# Patient Record
Sex: FEMALE | Race: WHITE | NOT HISPANIC OR LATINO | Employment: FULL TIME | URBAN - METROPOLITAN AREA
[De-identification: names, ages, dates, MRNs, and addresses within clinical notes are randomized per-mention and may not be internally consistent; named-entity substitution may affect disease eponyms.]

---

## 2017-02-27 DIAGNOSIS — K21.9 GASTROESOPHAGEAL REFLUX DISEASE WITHOUT ESOPHAGITIS: ICD-10-CM

## 2017-02-27 DIAGNOSIS — J31.0 CHRONIC RHINITIS: ICD-10-CM

## 2017-02-27 DIAGNOSIS — E78.5 HYPERLIPIDEMIA LDL GOAL <130: ICD-10-CM

## 2017-02-27 NOTE — TELEPHONE ENCOUNTER
Omeprazole      Last Written Prescription Date: 11/29/16  Last Fill Quantity: 90,  # refills: 0   Last Office Visit with [facility], [facility] or [facility] prescribing provider: 02/15/16                                         Next 5 appointments (look out 90 days)     Apr 17, 2017  3:00 PM CDT   Return Visit with Sunita Baird MD   [facility] ([facility])    [facility]  [address] [place], [place] [address]  [facility] [address]   [phone]              Simvastatin    Last Written Prescription Date: 11/29/16  Last Fill Quantity: 90, # refills: 0  Last Office Visit with [facility], [facility] or [facility] prescribing provider: 02/15/16  Next 5 appointments (look out 90 days)     Apr 17, 2017  3:00 PM CDT   Return Visit with Sunita Baird MD   [facility] ([facility])    [facility] [address]  [facility] [address]   [phone]                   Lab Results   Component Value Date    CHOL 269 12/17/2015     Lab Results   Component Value Date    HDL 60 12/17/2015     Lab Results   Component Value Date     12/17/2015     Lab Results   Component Value Date    TRIG 287 12/17/2015     Lab Results   Component Value Date    CHOLHDLRATIO 3.0 11/21/2013     Monteluskast      Last Written Prescription Date: 11/29/16  Last Fill Quantity: 90, # refills: 0    Last Office Visit with [facility], [facility] or [facility] prescribing provider:  02/15/16   Future Office Visit:    Next 5 appointments (look out 90 days)     Apr 17, 2017  3:00 PM CDT   Return Visit with Sunita Baird MD   [facility] ([facility])    [facility] [address]  [facility] [address]   [phone]                   Date of Last Asthma Action Plan Letter:   There are no preventive care reminders to display for this patient.   Asthma Control Test: No flowsheet data found.    Date of Last Spirometry Test:   No results found for this or any previous  visit.

## 2017-03-02 RX ORDER — SIMVASTATIN 40 MG
40 TABLET ORAL AT BEDTIME
Qty: 30 TABLET | Refills: 0 | Status: SHIPPED | OUTPATIENT
Start: 2017-03-02 | End: 2017-03-29

## 2017-03-02 RX ORDER — OMEPRAZOLE 40 MG/1
40 CAPSULE, DELAYED RELEASE ORAL DAILY
Qty: 30 CAPSULE | Refills: 0 | Status: SHIPPED | OUTPATIENT
Start: 2017-03-02 | End: 2017-03-29

## 2017-03-02 RX ORDER — MONTELUKAST SODIUM 10 MG/1
10 TABLET ORAL AT BEDTIME
Qty: 30 TABLET | Refills: 0 | Status: SHIPPED | OUTPATIENT
Start: 2017-03-02 | End: 2017-03-29

## 2017-03-29 ENCOUNTER — OFFICE VISIT (OUTPATIENT)
Dept: FAMILY MEDICINE | Facility: CLINIC | Age: 52
End: 2017-03-29
Payer: COMMERCIAL

## 2017-03-29 VITALS
RESPIRATION RATE: 18 BRPM | BODY MASS INDEX: 39.49 KG/M2 | SYSTOLIC BLOOD PRESSURE: 134 MMHG | HEIGHT: 65 IN | WEIGHT: 237 LBS | HEART RATE: 91 BPM | DIASTOLIC BLOOD PRESSURE: 89 MMHG

## 2017-03-29 DIAGNOSIS — E78.5 HYPERLIPIDEMIA LDL GOAL <130: ICD-10-CM

## 2017-03-29 DIAGNOSIS — J31.0 CHRONIC RHINITIS: ICD-10-CM

## 2017-03-29 DIAGNOSIS — Z11.59 NEED FOR HEPATITIS C SCREENING TEST: ICD-10-CM

## 2017-03-29 DIAGNOSIS — Z12.11 SPECIAL SCREENING FOR MALIGNANT NEOPLASMS, COLON: ICD-10-CM

## 2017-03-29 DIAGNOSIS — C50.412 MALIGNANT NEOPLASM OF UPPER-OUTER QUADRANT OF LEFT FEMALE BREAST (H): ICD-10-CM

## 2017-03-29 DIAGNOSIS — Z00.00 ROUTINE GENERAL MEDICAL EXAMINATION AT A HEALTH CARE FACILITY: Primary | ICD-10-CM

## 2017-03-29 DIAGNOSIS — R06.83 SNORING: ICD-10-CM

## 2017-03-29 DIAGNOSIS — E66.01 MORBID OBESITY, UNSPECIFIED OBESITY TYPE (H): ICD-10-CM

## 2017-03-29 DIAGNOSIS — S89.92XA LEFT KNEE INJURY, INITIAL ENCOUNTER: ICD-10-CM

## 2017-03-29 DIAGNOSIS — K21.9 GASTROESOPHAGEAL REFLUX DISEASE WITHOUT ESOPHAGITIS: ICD-10-CM

## 2017-03-29 LAB
ANION GAP SERPL CALCULATED.3IONS-SCNC: 8 MMOL/L (ref 3–14)
BUN SERPL-MCNC: 12 MG/DL (ref 7–30)
CALCIUM SERPL-MCNC: 9.8 MG/DL (ref 8.5–10.1)
CHLORIDE SERPL-SCNC: 104 MMOL/L (ref 94–109)
CHOLEST SERPL-MCNC: 194 MG/DL
CO2 SERPL-SCNC: 26 MMOL/L (ref 20–32)
CREAT SERPL-MCNC: 0.62 MG/DL (ref 0.52–1.04)
GFR SERPL CREATININE-BSD FRML MDRD: NORMAL ML/MIN/1.7M2
GLUCOSE SERPL-MCNC: 86 MG/DL (ref 70–99)
HCV AB SERPL QL IA: NORMAL
HDLC SERPL-MCNC: 82 MG/DL
LDLC SERPL CALC-MCNC: 83 MG/DL
NONHDLC SERPL-MCNC: 112 MG/DL
POTASSIUM SERPL-SCNC: 3.9 MMOL/L (ref 3.4–5.3)
SODIUM SERPL-SCNC: 138 MMOL/L (ref 133–144)
TRIGL SERPL-MCNC: 145 MG/DL

## 2017-03-29 PROCEDURE — 80048 BASIC METABOLIC PNL TOTAL CA: CPT | Performed by: FAMILY MEDICINE

## 2017-03-29 PROCEDURE — 99396 PREV VISIT EST AGE 40-64: CPT | Performed by: FAMILY MEDICINE

## 2017-03-29 PROCEDURE — 36415 COLL VENOUS BLD VENIPUNCTURE: CPT | Performed by: FAMILY MEDICINE

## 2017-03-29 PROCEDURE — 86803 HEPATITIS C AB TEST: CPT | Performed by: FAMILY MEDICINE

## 2017-03-29 PROCEDURE — 80061 LIPID PANEL: CPT | Performed by: FAMILY MEDICINE

## 2017-03-29 RX ORDER — OMEPRAZOLE 40 MG/1
40 CAPSULE, DELAYED RELEASE ORAL DAILY
Qty: 90 CAPSULE | Refills: 3 | Status: SHIPPED | OUTPATIENT
Start: 2017-03-29 | End: 2017-10-16

## 2017-03-29 RX ORDER — SIMVASTATIN 40 MG
40 TABLET ORAL AT BEDTIME
Qty: 90 TABLET | Refills: 3 | Status: SHIPPED | OUTPATIENT
Start: 2017-03-29 | End: 2018-03-21

## 2017-03-29 RX ORDER — MONTELUKAST SODIUM 10 MG/1
10 TABLET ORAL AT BEDTIME
Qty: 90 TABLET | Refills: 3 | Status: SHIPPED | OUTPATIENT
Start: 2017-03-29 | End: 2018-03-21

## 2017-03-29 RX ORDER — FLUTICASONE PROPIONATE 50 MCG
1-2 SPRAY, SUSPENSION (ML) NASAL DAILY
Qty: 3 BOTTLE | Refills: 11 | Status: SHIPPED | OUTPATIENT
Start: 2017-03-29 | End: 2018-04-16

## 2017-03-29 NOTE — MR AVS SNAPSHOT
After Visit Summary   3/29/2017    Ann Vargas    MRN: [id number]           Patient Information     Date Of Birth          1965        Visit Information        Provider Department      3/29/2017 8:40 AM Cydney Garcia MD [facility]        Today's Diagnoses     Routine general medical examination at a health care facility    -  1    Hyperlipidemia LDL goal <130        Gastroesophageal reflux disease without esophagitis        Chronic rhinitis        Special screening for malignant neoplasms, colon        Morbid obesity, unspecified obesity type (H)        Malignant neoplasm of upper-outer quadrant of left female breast (H)        Left knee injury, initial encounter        Snoring          Care Instructions          Thank you for choosing [facility].  You may be receiving a survey in the mail from [employer] regarding your visit today.  Please take a few minutes to complete and return the survey to let us know how we are doing.      Our Clinic hours are:  Mondays    7:20 am - 7 pm  Tues -  Fri  7:20 am - 5 pm    Clinic Phone: [phone]    The clinic lab opens at 7:30 am Mon - Fri and appointments are required.    [facility]. [phone]  Monday-Thursday 8 am - 7pm  Tues/Wed/Fri 8 am - 5:30 pm         Preventive Health Recommendations  Female Ages 50 - 64    Yearly exam: See your health care provider every year in order to  o Review health changes.   o Discuss preventive care.    o Review your medicines if your doctor has prescribed any.      Get a Pap test every three years (unless you have an abnormal result and your provider advises testing more often).    If you get Pap tests with HPV test, you only need to test every 5 years, unless you have an abnormal result.     You do not need a Pap test if your uterus was removed (hysterectomy) and you have not had cancer.    You should be tested each year for STDs (sexually transmitted diseases)  if you're at risk.     Have a mammogram every 1 to 2 years.    Have a colonoscopy at age 50, or have a yearly FIT test (stool test). These exams screen for colon cancer.      Have a cholesterol test every 5 years, or more often if advised.    Have a diabetes test (fasting glucose) every three years. If you are at risk for diabetes, you should have this test more often.     If you are at risk for osteoporosis (brittle bone disease), think about having a bone density scan (DEXA).    Shots: Get a flu shot each year. Get a tetanus shot every 10 years.    Nutrition:     Eat at least 5 servings of fruits and vegetables each day.    Eat whole-grain bread, whole-wheat pasta and brown rice instead of white grains and rice.    Talk to your provider about Calcium and Vitamin D.     Lifestyle    Exercise at least 150 minutes a week (30 minutes a day, 5 days a week). This will help you control your weight and prevent disease.    Limit alcohol to one drink per day.    No smoking.     Wear sunscreen to prevent skin cancer.     See your dentist every six months for an exam and cleaning.    See your eye doctor every 1 to 2 years.          Follow-ups after your visit        Additional Services     SLEEP EVALUATION & MANAGEMENT REFERRAL - ADULT       Please be aware that coverage of these services is subject to the terms and limitations of your health insurance plan.  Call member services at your health plan with any benefit or coverage questions.      Please bring the following to your appointment:    >>   List of current medications   >>   This referral request   >>   Any documents/labs given to you for this referral    [facility] Lab  Ph [phone] (Age 2 and up)                  Your next 10 appointments already scheduled     Apr 10, 2017  5:00 PM CDT   LAB with CL LAB   [facility] ([facility])    [address] [facility] [address] [phone]            Patient must bring picture ID.  Patient should be prepared to give a urine specimen  Please do not eat 10-12 hours before your appointment if you are coming in fasting for labs on lipids, cholesterol, or glucose (sugar).  Pregnant women should follow their Care Team instructions. Water with medications is okay. Do not drink coffee or other fluids.   If you have concerns about taking  your medications, please ask at office or if scheduling via [employer], send a message by clicking on Secure Messaging, Message Your Care Team.            Apr 17, 2017  3:00 PM CDT   Return Visit with Sunita Baird MD   [facility] ([facility])    [facility]  [address] [facility] [address]  [facility] [address] [phone]              Future tests that were ordered for you today     Open Future Orders        Priority Expected Expires Ordered    SLEEP EVALUATION & MANAGEMENT REFERRAL - ADULT Routine  3/29/2018 3/29/2017    Fecal colorectal cancer screen (FIT) Routine 4/19/2017 6/21/2017 3/29/2017            Who to contact     If you have questions or need follow up information about today's clinic visit or your schedule please contact [facility] directly at [phone].  Normal or non-critical lab and imaging results will be communicated to you by MyChart, letter or phone within 4 business days after the clinic has received the results. If you do not hear from us within 7 days, please contact the clinic through [employer] or phone. If you have a critical or abnormal lab result, we will notify you by phone as soon as possible.  Submit refill requests through [employer] or call your pharmacy and they will forward the refill request to us. Please allow 3 business days for your refill to be completed.          Additional Information About Your Visit        [employer] Information     [employer] gives you secure access to your electronic health record. If you see a primary care provider, you can  "also send messages to your care team and make appointments. If you have questions, please call your primary care clinic.  If you do not have a primary care provider, please call [phone] and they will assist you.        Care EveryWhere ID     This is your Care EveryWhere ID. This could be used by other organizations to access your [facility] medical records  [id number]        Your Vitals Were     Pulse Respirations Height Last Period BMI (Body Mass Index)       91 18 5' 5.25\" (1.657 m) 02/17/2015 39.14 kg/m2        Blood Pressure from Last 3 Encounters:   03/29/17 134/89   10/17/16 142/74   04/19/16 120/70    Weight from Last 3 Encounters:   03/29/17 237 lb (107.5 kg)   10/17/16 238 lb 11.2 oz (108.3 kg)   04/19/16 238 lb 6.4 oz (108.1 kg)              We Performed the Following     Basic metabolic panel     Lipid panel reflex to direct LDL          Today's Medication Changes          These changes are accurate as of: 3/29/17  9:06 AM.  If you have any questions, ask your nurse or doctor.               Start taking these medicines.        Dose/Directions    fluticasone 50 MCG/ACT spray   Commonly known as:  FLONASE   Used for:  Chronic rhinitis   Started by:  Cydney Garcia MD        Dose:  1-2 spray   Spray 1-2 sprays into both nostrils daily   Quantity:  3 Bottle   Refills:  11         These medicines have changed or have updated prescriptions.        Dose/Directions    montelukast 10 MG tablet   Commonly known as:  SINGULAIR   This may have changed:  additional instructions   Used for:  Chronic rhinitis   Changed by:  Cydney Garcia MD        Dose:  10 mg   Take 1 tablet (10 mg) by mouth At Bedtime   Quantity:  90 tablet   Refills:  3       omeprazole 40 MG capsule   Commonly known as:  priLOSEC   This may have changed:  additional instructions   Used for:  Gastroesophageal reflux disease without esophagitis   Changed by:  Cydney Garcia MD        Dose:  40 mg   Take 1 capsule (40 mg) by mouth daily " Take 30-60 minutes before a meal.   Quantity:  90 capsule   Refills:  3       simvastatin 40 MG tablet   Commonly known as:  ZOCOR   This may have changed:  additional instructions   Used for:  Hyperlipidemia LDL goal <130   Changed by:  Cydney Garcia MD        Dose:  40 mg   Take 1 tablet (40 mg) by mouth At Bedtime   Quantity:  90 tablet   Refills:  3            Where to get your medicines      These medications were sent to [facility] - [address]  [address] [facility] [address]     Phone:  [phone]     fluticasone 50 MCG/ACT spray    montelukast 10 MG tablet    omeprazole 40 MG capsule    simvastatin 40 MG tablet                Primary Care Provider Office Phone # Fax #    Cydney Garcia [phone]       [facility] [address] [facility] [address]        Thank you!     Thank you for choosing [facility]  for your care. Our goal is always to provide you with excellent care. Hearing back from our patients is one way we can continue to improve our services. Please take a few minutes to complete the written survey that you may receive in the mail after your visit with us. Thank you!             Your Updated Medication List - Protect others around you: Learn how to safely use, store and throw away your medicines at www.disposemymeds.org.          This list is accurate as of: 3/29/17  9:06 AM.  Always use your most recent med list.                   Brand Name Dispense Instructions for use    anastrozole 1 MG tablet    ARIMIDEX    90 tablet    Take 1 tablet (1 mg) by mouth daily       ASPIRIN PO      Take 650 mg by mouth daily       BENADRYL PO      Take 25 mg by mouth every 4 hours as needed for itching (Hives)       EPINEPHrine 0.3 MG/0.3ML injection     1 each    Inject 0.3 mLs (0.3 mg) into the muscle once as needed for anaphylaxis       fluticasone 50 MCG/ACT spray    FLONASE    3 Bottle    Spray 1-2  sprays into both nostrils daily       LOTEMAX 0.5 % ophthalmic susp   Generic drug:  loteprednol      Place 1 drop into both eyes daily as needed.       montelukast 10 MG tablet    SINGULAIR    90 tablet    Take 1 tablet (10 mg) by mouth At Bedtime       omeprazole 40 MG capsule    priLOSEC    90 capsule    Take 1 capsule (40 mg) by mouth daily Take 30-60 minutes before a meal.       simvastatin 40 MG tablet    ZOCOR    90 tablet    Take 1 tablet (40 mg) by mouth At Bedtime       VENTOLIN  (90 BASE) MCG/ACT Inhaler   Generic drug:  albuterol     1 Inhaler    Inhale 1 puff into the lungs every 4 hours as needed

## 2017-03-29 NOTE — PATIENT INSTRUCTIONS
Thank you for choosing [facility].  You may be receiving a survey in the mail from [employer] regarding your visit today.  Please take a few minutes to complete and return the survey to let us know how we are doing.      Our Clinic hours are:  Mondays    7:20 am - 7 pm  Tues - Fri  7:20 am - 5 pm    Clinic Phone: [phone]    The clinic lab opens at 7:30 am Mon - Fri and appointments are required.    [facility]. [phone]  Monday-Thursday 8 am - 7pm  Tues/Wed/Fri 8 am - 5:30 pm         Preventive Health Recommendations  Female Ages 50 - 64    Yearly exam: See your health care provider every year in order to  o Review health changes.   o Discuss preventive care.    o Review your medicines if your doctor has prescribed any.      Get a Pap test every three years (unless you have an abnormal result and your provider advises testing more often).    If you get Pap tests with HPV test, you only need to test every 5 years, unless you have an abnormal result.     You do not need a Pap test if your uterus was removed (hysterectomy) and you have not had cancer.    You should be tested each year for STDs (sexually transmitted diseases) if you're at risk.     Have a mammogram every 1 to 2 years.    Have a colonoscopy at age 50, or have a yearly FIT test (stool test). These exams screen for colon cancer.      Have a cholesterol test every 5 years, or more often if advised.    Have a diabetes test (fasting glucose) every three years. If you are at risk for diabetes, you should have this test more often.     If you are at risk for osteoporosis (brittle bone disease), think about having a bone density scan (DEXA).    Shots: Get a flu shot each year. Get a tetanus shot every 10 years.    Nutrition:     Eat at least 5 servings of fruits and vegetables each day.    Eat whole-grain bread, whole-wheat pasta and brown rice instead of white grains and rice.    Talk to your provider about Calcium  and Vitamin D.     Lifestyle    Exercise at least 150 minutes a week (30 minutes a day, 5 days a week). This will help you control your weight and prevent disease.    Limit alcohol to one drink per day.    No smoking.     Wear sunscreen to prevent skin cancer.     See your dentist every six months for an exam and cleaning.    See your eye doctor every 1 to 2 years.

## 2017-03-29 NOTE — NURSING NOTE
"Chief Complaint   Patient presents with     Physical     Patient twisted her left knee last month and it is still causing some discomfort.      Health Maintenance     agreed to fit       Initial /89  Pulse 91  Resp 18  Ht 5' 5.25\" (1.657 m)  Wt 237 lb (107.5 kg)  LMP 02/17/2015  BMI 39.14 kg/m2 Estimated body mass index is 39.14 kg/(m^2) as calculated from the following:    Height as of this encounter: 5' 5.25\" (1.657 m).    Weight as of this encounter: 237 lb (107.5 kg).  Medication Reconciliation: complete    "

## 2017-03-29 NOTE — PROGRESS NOTES
"   SUBJECTIVE:     CC: Ann Vargas is an 51 year old woman who presents for preventive health visit.     Physical   Annual:     Getting at least 3 servings of Calcium per day::  Yes    Bi-annual eye exam::  Yes    Dental care twice a year::  Yes    Sleep apnea or symptoms of sleep apnea::  Excessive snoring    Diet::  Regular (no restrictions)    Frequency of exercise::  1 day/week    Duration of exercise::  Less than 15 minutes    Taking medications regularly::  Yes    Medication side effects::  Not applicable    Additional concerns today::  No    Twisted her left knee last month.  Still gets some \"twinges\".  It gets painful with kneeling.  Not locking or giving out.  No swelling at this time.  Getting on a shuttle at work and foot caught on the seat.      Also snoring.  Dust from building project at work.  Congested feeling.  Hasn't been using a nasal steroid.    told her she should be checked for AMAURY.    Hyperlipidemia Follow-Up      Rate your low fat/cholesterol diet?: good    Taking statin?  Yes, no muscle aches from statin    Other lipid medications/supplements?:  none       Today's PHQ-2 Score:   PHQ-2 ( 1999 Pfizer) 3/29/2017   Q1: Little interest or pleasure in doing things 0   Q2: Feeling down, depressed or hopeless 0   PHQ-2 Score 0   Little interest or pleasure in doing things -   Feeling down, depressed or hopeless -   PHQ-2 Score -       Abuse: Current or Past(Physical, Sexual or Emotional)- No  Do you feel safe in your environment - Yes    Social History   Substance Use Topics     Smoking status: Never Smoker     Smokeless tobacco: Never Used     Alcohol use 0.0 oz/week     0 Standard drinks or equivalent per week      Comment: -monthly     The patient does not drink >3 drinks per day nor >7 drinks per week.    Recent Labs   Lab Test  12/17/15   0833  11/21/13   1004  10/23/12   0930   CHOL  269*  255*  245*   HDL  60  98  98   LDL  152*  116  124   TRIG  287*  206*  115   CHOLHDLRATIO   -- " "  3.0  3.0   [facility]  209*   --    --        Reviewed orders with patient.  Reviewed health maintenance and updated orders accordingly - Yes    Mammo Decision Support:  Patient over age 50, mutual decision to screen reflected in health maintenance.  Alternate mammogram schedule due to breast cancer history      Pertinent mammograms are reviewed under the imaging tab.  History of abnormal Pap smear: NO - age 30- 65 PAP every 3 years recommended    Reviewed and updated as needed this visit by clinical staff  Tobacco  Allergies  Med Hx  Surg Hx  Fam Hx  Soc Hx        Reviewed and updated as needed this visit by Provider            ROS:  C: NEGATIVE for fever, chills, change in weight  I: NEGATIVE for worrisome rashes, moles or lesions  E: NEGATIVE for vision changes or irritation  ENT: NEGATIVE for ear, mouth and throat problems  R: NEGATIVE for significant cough or SOB  B: NEGATIVE for masses, tenderness or discharge  CV: NEGATIVE for chest pain, palpitations or peripheral edema  GI: NEGATIVE for nausea, abdominal pain, heartburn, or change in bowel habits  : NEGATIVE for unusual urinary or vaginal symptoms. No vaginal bleeding.  MUSCULOSKELETAL:POSITIVE  for right knee pain, see above  N: NEGATIVE for weakness, dizziness or paresthesias  P: NEGATIVE for changes in mood or affect     Problem list, Medication list, Allergies, and Medical/Social/Surgical histories reviewed in [facility] and updated as appropriate.  Labs reviewed in EPIC  BP Readings from Last 3 Encounters:   03/29/17 134/89   10/17/16 142/74   04/19/16 120/70    Wt Readings from Last 3 Encounters:   03/29/17 237 lb (107.5 kg)   10/17/16 238 lb 11.2 oz (108.3 kg)   04/19/16 238 lb 6.4 oz (108.1 kg)                  OBJECTIVE:     /89  Pulse 91  Resp 18  Ht 5' 5.25\" (1.657 m)  Wt 237 lb (107.5 kg)  LMP 02/17/2015  BMI 39.14 kg/m2  EXAM:  GENERAL: healthy, alert and no distress  EYES: Eyes grossly normal to inspection, PERRL and conjunctivae and " sclerae normal  HENT: ear canals and TM's normal, nose and mouth without ulcers or lesions  NECK: no adenopathy, no asymmetry, masses, or scars and thyroid normal to palpation  RESP: lungs clear to auscultation - no rales, rhonchi or wheezes  BREAST: normal without masses, tenderness or nipple discharge and well healed biopsy incision left lateral  CV: regular rate and rhythm, normal S1 S2, no S3 or S4, no murmur, click or rub, no peripheral edema and peripheral pulses strong  ABDOMEN: soft, nontender, no hepatosplenomegaly, no masses and bowel sounds normal  MS: LLE exam shows normal strength and muscle mass, ROM of all joints is normal and +McMurrays on the left with internal rotation  SKIN: no suspicious lesions or rashes  NEURO: Normal strength and tone, mentation intact and speech normal  PSYCH: mentation appears normal, affect normal/bright    ASSESSMENT/PLAN:     1. Routine general medical examination at a health care facility        2. Hyperlipidemia LDL goal <130     - simvastatin (ZOCOR) 40 MG tablet; Take 1 tablet (40 mg) by mouth At Bedtime  Dispense: 90 tablet; Refill: 3  - Lipid panel reflex to direct LDL  - Basic metabolic panel    3. Gastroesophageal reflux disease without esophagitis     - omeprazole (PRILOSEC) 40 MG capsule; Take 1 capsule (40 mg) by mouth daily Take 30-60 minutes before a meal.  Dispense: 90 capsule; Refill: 3    4. Chronic rhinitis   start flonase again - increased dust with construction at work  - montelukast (SINGULAIR) 10 MG tablet; Take 1 tablet (10 mg) by mouth At Bedtime  Dispense: 90 tablet; Refill: 3  - fluticasone (FLONASE) 50 MCG/ACT spray; Spray 1-2 sprays into both nostrils daily  Dispense: 3 Bottle; Refill: 11    5. Special screening for malignant neoplasms, colon     - Fecal colorectal cancer screen (FIT); Future    6. Morbid obesity, unspecified obesity type (H)       7. Malignant neoplasm of upper-outer quadrant of left female breast (H)   continues to see   "Ge    8. Left knee injury, initial encounter   consider CSI if pain continues.  May need [facility] referral    9. Snoring     - SLEEP EVALUATION & MANAGEMENT REFERRAL - ADULT; Future    10. Need for hepatitis C screening test     - Hepatitis C antibody    COUNSELING:  Reviewed preventive health counseling, as reflected in patient instructions       Regular exercise       Healthy diet/nutrition    BP Screening:   Last 3 BP Readings:    BP Readings from Last 3 Encounters:   03/29/17 134/89   10/17/16 142/74   04/19/16 120/70       The following was recommended to the patient:  Re-screen BP within a year and recommended lifestyle modifications     reports that she has never smoked. She has never used smokeless tobacco.    Estimated body mass index is 39.14 kg/(m^2) as calculated from the following:    Height as of this encounter: 5' 5.25\" (1.657 m).    Weight as of this encounter: 237 lb (107.5 kg).   Weight management plan: Discussed healthy diet and exercise guidelines and patient will follow up in 12 months in clinic to re-evaluate.    Counseling Resources:  ATP IV Guidelines  Pooled Cohorts Equation Calculator  Breast Cancer Risk Calculator  FRAX Risk Assessment  ICSI Preventive Guidelines  Dietary Guidelines for Americans, 2010  USDA's MyPlate  ASA Prophylaxis  Lung CA Screening    Cydney Garcia MD  [facility]  Answers for HPI/ROS submitted by the patient on 3/27/2017   Q1: Little interest or pleasure in doing things: 0=Not at all  Q2: Feeling down, depressed or hopeless: 0=Not at all  PHQ-2 Score: 0    "

## 2017-03-30 NOTE — PROGRESS NOTES
Ann,    All of the labs were normal or acceptable.    Please contact my office if you have questions.    Cydney Garcia M.D.

## 2017-04-10 DIAGNOSIS — Z85.3 PERSONAL HISTORY OF MALIGNANT NEOPLASM OF BREAST: ICD-10-CM

## 2017-04-10 PROCEDURE — 86300 IMMUNOASSAY TUMOR CA 15-3: CPT | Performed by: INTERNAL MEDICINE

## 2017-04-10 PROCEDURE — 80076 HEPATIC FUNCTION PANEL: CPT | Performed by: INTERNAL MEDICINE

## 2017-04-10 PROCEDURE — 36415 COLL VENOUS BLD VENIPUNCTURE: CPT | Performed by: INTERNAL MEDICINE

## 2017-04-11 LAB
ALBUMIN SERPL-MCNC: 3.2 G/DL (ref 3.4–5)
ALP SERPL-CCNC: 102 U/L (ref 40–150)
ALT SERPL W P-5'-P-CCNC: 49 U/L (ref 0–50)
AST SERPL W P-5'-P-CCNC: 32 U/L (ref 0–45)
BILIRUB DIRECT SERPL-MCNC: <0.1 MG/DL (ref 0–0.2)
BILIRUB SERPL-MCNC: 0.3 MG/DL (ref 0.2–1.3)
CANCER AG27-29 SERPL-ACNC: 17 U/ML (ref 0–39)
PROT SERPL-MCNC: 7.3 G/DL (ref 6.8–8.8)

## 2017-04-17 ENCOUNTER — ONCOLOGY VISIT (OUTPATIENT)
Dept: ONCOLOGY | Facility: CLINIC | Age: 52
End: 2017-04-17
Attending: INTERNAL MEDICINE
Payer: COMMERCIAL

## 2017-04-17 VITALS
HEART RATE: 97 BPM | RESPIRATION RATE: 16 BRPM | OXYGEN SATURATION: 97 % | BODY MASS INDEX: 39.99 KG/M2 | HEIGHT: 65 IN | DIASTOLIC BLOOD PRESSURE: 79 MMHG | WEIGHT: 240 LBS | SYSTOLIC BLOOD PRESSURE: 144 MMHG | TEMPERATURE: 98.8 F

## 2017-04-17 DIAGNOSIS — E66.9 NON MORBID OBESITY, UNSPECIFIED OBESITY TYPE: Primary | ICD-10-CM

## 2017-04-17 DIAGNOSIS — R05.9 COUGH: ICD-10-CM

## 2017-04-17 DIAGNOSIS — Z85.3 PERSONAL HISTORY OF MALIGNANT NEOPLASM OF BREAST: ICD-10-CM

## 2017-04-17 PROCEDURE — 99214 OFFICE O/P EST MOD 30 MIN: CPT | Performed by: INTERNAL MEDICINE

## 2017-04-17 PROCEDURE — 99211 OFF/OP EST MAY X REQ PHY/QHP: CPT

## 2017-04-17 RX ORDER — ANASTROZOLE 1 MG/1
1 TABLET ORAL DAILY
Qty: 90 TABLET | Refills: 3 | Status: CANCELLED | OUTPATIENT
Start: 2017-04-17

## 2017-04-17 RX ORDER — AZITHROMYCIN 250 MG/1
250 TABLET, FILM COATED ORAL DAILY
Qty: 6 TABLET | Refills: 0 | Status: SHIPPED | OUTPATIENT
Start: 2017-04-17 | End: 2018-04-16

## 2017-04-17 ASSESSMENT — PAIN SCALES - GENERAL: PAINLEVEL: NO PAIN (0)

## 2017-04-17 NOTE — PATIENT INSTRUCTIONS
We would like to see you back in 6 months with labs prior. She also recommends that you complete your mammogram in June.    When you are in need of a refill, please call your pharmacy and they will send us a request.  Copy of appointments, and after visit summary (AVS) given to patient.  If you have any questions please call Padmaja Mayo RN, BSN, OCN Oncology Hematology  [facility] [phone]. For questions after business hours, or on holidays/weekends, please call our after hours Nurse Triage line [phone]. Thank you.

## 2017-04-17 NOTE — NURSING NOTE
"Ann Vargas is a 51 year old female who presents for:  Chief Complaint   Patient presents with     Oncology Clinic Visit     6 month recheck Breast CA, review labs        Initial Vitals:  /79 (BP Location: Right arm, Patient Position: Chair, Cuff Size: Adult Large)  Pulse 97  Temp 98.8  F (37.1  C) (Tympanic)  Resp 16  Ht 1.657 m (5' 5.25\")  Wt 108.9 kg (240 lb)  LMP 02/17/2015  SpO2 97%  Breastfeeding? No  BMI 39.63 kg/m2 Estimated body mass index is 39.63 kg/(m^2) as calculated from the following:    Height as of this encounter: 1.657 m (5' 5.25\").    Weight as of this encounter: 108.9 kg (240 lb).. Body surface area is 2.24 meters squared. BP completed using cuff size: large  No Pain (0) Patient's last menstrual period was 02/17/2015. Allergies and medications reviewed.     Medications: MEDICATION REFILLS NEEDED TODAY.  Pharm acy name entered into [facility]:      [facility] - [place], [place] - [address] [facility] - [facility], [place] - [address]    Comments: 6 month recheck Breast CA, review labs    7  minutes for nursing intake (face to face time)   Annika Oropeza CMA        "

## 2017-04-17 NOTE — MR AVS SNAPSHOT
After Visit Summary   4/17/2017    Ann Vargas    MRN: [id number]           Patient Information     Date Of Birth          1965        Visit Information        Provider Department      4/17/2017 3:00 PM Sunita Baird MD [facility]        Today's Diagnoses     Non morbid obesity, unspecified obesity type    -  1    Personal history of malignant neoplasm of breast        Cough           Follow-ups after your visit        Your next 10 appointments already scheduled     Jul 10, 2017  8:00 AM CDT   MA SCREENING BILATERAL W/ KHUSHBOO with [address] [facility] ([facility])    [address]  [facility] [id number] [phone]           Do not use any powder, lotion or deodorant under your arms or on your breast. If you do, we will ask you to remove it before your exam.  Wear comfortable, two-piece clothing.  If you have any allergies, tell your care team.  Bring any previous mammograms from other facilities or have them mailed to the breast center.            Oct 09, 2017  5:00 PM CDT   LAB with [facility] LAB   [facility] ([facility])    [address] [facility] [facility] [id number] [phone]           Patient must bring picture ID.  Patient should be prepared to give a urine specimen  Please do not eat 10-12 hours before your appointment if you are coming in fasting for labs on lipids, cholesterol, or glucose (sugar).  Pregnant women should follow their Care Team instructions. Water with medications is okay. Do not drink coffee or other fluids.   If you have concerns about taking  your medications, please ask at office or if scheduling via [facility], send a message by clicking on Secure Messaging, Message Your Care Team.            Oct 16, 2017  3:00 PM CDT   Return Visit with Sunita Baird MD   [facility] ([facility])    [facility]  [address]  [facility] [address]  "  [phone]              Future tests that were ordered for you today     Open Future Orders        Priority Expected Expires Ordered    Hepatic panel Routine 10/1/2017 10/31/2017 4/17/2017    Ca27.29  breast tumor marker Routine 10/1/2017 10/31/2017 4/17/2017    MA Screen Bilateral w/Gabe Routine 6/1/2017 4/17/2018 4/17/2017            Who to contact     If you have questions or need follow up information about today's clinic visit or your schedule please contact [facility] directly at [phone].  Normal or non-critical lab and imaging results will be communicated to you by [employer], letter or phone within 4 business days after the clinic has received the results. If you do not hear from us within 7 days, please contact the clinic through [employer] or phone. If you have a critical or abnormal lab result, we will notify you by phone as soon as possible.  Submit refill requests through [employer] or call your pharmacy and they will forward the refill request to us. Please allow 3 business days for your refill to be completed.          Additional Information About Your Visit        MyChart Information     [employer] gives you secure access to your electronic health record. If you see a primary care provider, you can also send messages to your care team and make appointments. If you have questions, please call your primary care clinic.  If you do not have a primary care provider, please call [phone] and they will assist you.        Care EveryWhere ID     This is your Care EveryWhere ID. This could be used by other organizations to access your [place] medical records  [id number]        Your Vitals Were     Pulse Temperature Respirations Height Last Period Pulse Oximetry    97 98.8  F (37.1  C) (Tympanic) 16 1.657 m (5' 5.25\") 02/17/2015 97%    Breastfeeding? BMI (Body Mass Index)                No 39.63 kg/m2           Blood Pressure from Last 3 Encounters:   04/17/17 144/79   03/29/17 134/89   10/17/16 " 142/74    Weight from Last 3 Encounters:   04/17/17 108.9 kg (240 lb)   03/29/17 107.5 kg (237 lb)   10/17/16 108.3 kg (238 lb 11.2 oz)                 Today's Medication Changes          These changes are accurate as of: 4/17/17  4:10 PM.  If you have any questions, ask your nurse or doctor.               Start taking these medicines.        Dose/Directions    azithromycin 250 MG tablet   Commonly known as:  ZITHROMAX   Used for:  Cough   Started by:  Sunita Baird MD        Dose:  250 mg   Take 1 tablet (250 mg) by mouth daily   Quantity:  6 tablet   Refills:  0            Where to get your medicines      Some of these will need a paper prescription and others can be bought over the counter.  Ask your nurse if you have questions.     Bring a paper prescription for each of these medications     azithromycin 250 MG tablet                Primary Care Provider Office Phone # Fax #    Cydney Garcia [phone]       [facility] [address] [facility] [address]        Thank you!     Thank you for choosing [facility] [place]  for your care. Our goal is always to provide you with excellent care. Hearing back from our patients is one way we can continue to improve our services. Please take a few minutes to complete the written survey that you may receive in the mail after your visit with us. Thank you!             Your Updated Medication List - Protect others around you: Learn how to safely use, store and throw away your medicines at www.disposemymeds.org.          This list is accurate as of: 4/17/17  4:10 PM.  Always use your most recent med list.                   Brand Name Dispense Instructions for use    anastrozole 1 MG tablet    ARIMIDEX    90 tablet    Take 1 tablet (1 mg) by mouth daily       ASPIRIN PO      Take 650 mg by mouth as needed for moderate pain       azithromycin 250 MG tablet    ZITHROMAX    6 tablet    Take 1 tablet (250 mg) by mouth daily       BENADRYL PO      Take  25 mg by mouth every 4 hours as needed for itching (Hives)       EPINEPHrine 0.3 MG/0.3ML injection     1 each    Inject 0.3 mLs (0.3 mg) into the muscle once as needed for anaphylaxis       fluticasone 50 MCG/ACT spray    FLONASE    3 Bottle    Spray 1-2 sprays into both nostrils daily       LOTEMAX 0.5 % ophthalmic susp   Generic drug:  loteprednol      Place 1 drop into both eyes daily as needed.       montelukast 10 MG tablet    SINGULAIR    90 tablet    Take 1 tablet (10 mg) by mouth At Bedtime       omeprazole 40 MG capsule    priLOSEC    90 capsule    Take 1 capsule (40 mg) by mouth daily Take 30-60 minutes before a meal.       simvastatin 40 MG tablet    ZOCOR    90 tablet    Take 1 tablet (40 mg) by mouth At Bedtime       VENTOLIN  (90 BASE) MCG/ACT Inhaler   Generic drug:  albuterol     1 Inhaler    Inhale 1 puff into the lungs every 4 hours as needed

## 2017-04-17 NOTE — PROGRESS NOTES
"Primary PHYSICIAN:  Cydney Garcia MD      CHIEF COMPLAINT AND REASON FOR Visit:   left breast cancer T1bN0 s/p lumpectomy 11/2014, RT 1/2015.      HISTORY OF PRESENT ILLNESS:  Ann Vargas is a 48-year-old peromenopausal woman had her screening mammogram 09/2014,  found to have new microcalcification in the left upper outer quadrant of the breast around 1 o'clock position 8 cm from the nipple.  Stereotactic biopsy on the left upper outer quadrant of the breast microcalcifications sites, one is negative.  The other one turned out to be invasive ductal cancer, grade 1, negative lymphovascular invasion, no DCIS, ER/% positive, HER-2/seth is negative.  She  did not feel breast mass, no skin changes.  She had post-biopsy hematoma.  Left lumpectomy 11/19/2014 found 5 mm IDC, Grade I, 2 sLN negative, clear margin. She has pT1bN0 disease.    She finished RT 1/2015. Tamoxifen was advised after. LMP 1/2015. It is changed to Arimidex 4/2016.        OTHER MEDICAL PROBLEMS:  Obesity, hyperlipidemia, GERD.   OCP for yrs d/c 5/2014 found to be post menopausal by blood work in 6/2014      MEDICATIONS:  Reviewed in Epic system.      SOCIAL HISTORY:  She lives in [place].  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  Denies smoking or alcohol abuse.      FAMILY HISTORY:  One great-grandmother had breast cancer, she was a survivor.  One grandmother had disease from ovarian cancer.      REVIEW OF SYSTEMS:   She had negative endometrium biopsy, her LMP remains to be 1/2015.   She has hard time losing her weight.   No more chest wall or breast complains.   She has ongoing cough for > 2 wks.      PHYSICAL EXAMINATION:   VITAL SIGNS: Blood pressure 144/79, pulse 97, temperature 98.8  F (37.1  C), temperature source Tympanic, resp. rate 16, height 1.657 m (5' 5.25\"), weight 108.9 kg (240 lb), last menstrual period 02/17/2015, SpO2 97 %, not currently breastfeeding.  GENERAL APPEARANCE:  Middle aged woman looks like stated age.  " She is a little bit anxious.  She is morbidly obese.  Not in acute distress.   HEENT: The patient is normocephalic, atraumatic. Pupils are equally reactive to light.  Sclerae are anicteric.  Moist oral mucosa.  Negative pharynx.  No oral thrush.   NECK:  Supple.  No jugular venous distention.  Thyroid is not palpable.   LYMPH NODES:  Superficial lymphadenopathy is not appreciable in the bilateral cervical, supraclavicular, axillary or inguinal areas.   CARDIOVASCULAR:  S1, S2 regular with no murmurs or gallops.  No carotid or abdominal bruits.   PULMONARY:  Lungs are clear to auscultation and percussion bilaterally.  There is no wheezing or rhonchi.   GASTROINTESTINAL:  Abdomen is soft, nontender.  No hepatosplenomegaly.  No signs of ascites.  No mass appreciable.   MUSCULOSKELETAL/EXTREMITIES:  No edema.  No cyanotic changes.  No signs of joint deformity.  No lymphedema.   NEUROLOGIC:  Cranial nerves II-XII are grossly intact.  Sensation intact.  Muscle strength and muscle tone symmetrical, 5/5 throughout.   BACK:  No spinal or paraspinal tenderness.  No CVA tenderness.   SKIN:  No petechiae.  No rash.  No signs of cellulitis.   BREASTS:  Bilateral breast exam review.  Bilateral oversized symmetrical breasts.  Right side is clear.  Left side has well healed lumpectomy and sentinel LN scar.      CURRENT DATA   LFT/marker are good.    4/2016 endometrium biopsy - - A small amount of minute fragments of benign nonphasic endometrium, negative for hyperplasia, atypia and malignancy.   - A small amount of normal endocervical tissue.     Current Image  L MA 6/2016: negative      [facility] with [facility]  Left lumpectomy 11/19/2014 found 5 mm IDC, Grade I, 2 sLN negative, clear margin. She has pT1bN0 disease.    Biopsy 10/29/2014- tereotactic biopsy on the left upper outer quadrant of the breast microcalcifications sites, one is negative.  The other one turned out to be invasive ductal cancer, grade 1, negative  lymphovascular invasion, no DCIS, ER/% positive, HER-2/seth is negative.    dexa 12/2014: nl gone desity  Bone scan 12/2014: no mets         ASSESSMENT AND PLAN:    1.  left infiltrating ductal cancer, grade 1, ER/% positive, HER 2 negative, negative lymphovascular invasion, no DCIS, diagnosed through screening mammogram due to new microcalcification in a 48-year-old otherwise quite healthy postmenopausal woman.   S/p lumpectomy 11/2014 found 5 mm GI, IDC, 2LN negative, with clear margin. She has pT1bN0 disease.   She finished RT 1/2015.      Tamoxifen was advised after. LMP 1/2015. It was changed to Arimidex 4/2016. We talked about the side effects and how she needs to be followed up.    She is due 6 months f/u with labs.     2. Baseline obesity. She is gaining weight now. Weight loss counseling is provided.   She is informed post menopausal obesity is a risk factor for breast cancer.      3. Cough is bad. Negative lung exam. Likely URI. Advice Z pack.

## 2017-07-06 ENCOUNTER — HOSPITAL ENCOUNTER (EMERGENCY)
Facility: CLINIC | Age: 52
Discharge: HOME | End: 2017-07-06
Attending: EMERGENCY MEDICINE | Admitting: EMERGENCY MEDICINE
Payer: COMMERCIAL

## 2017-07-06 VITALS
RESPIRATION RATE: 20 BRPM | SYSTOLIC BLOOD PRESSURE: 135 MMHG | DIASTOLIC BLOOD PRESSURE: 90 MMHG | TEMPERATURE: 97.6 F | OXYGEN SATURATION: 96 %

## 2017-07-06 DIAGNOSIS — T78.40XA ALLERGIC REACTION, INITIAL ENCOUNTER: ICD-10-CM

## 2017-07-06 PROCEDURE — 99284 EMERGENCY DEPT VISIT MOD MDM: CPT | Performed by: EMERGENCY MEDICINE

## 2017-07-06 PROCEDURE — 25000128 H RX IP 250 OP 636: Performed by: EMERGENCY MEDICINE

## 2017-07-06 PROCEDURE — 99284 EMERGENCY DEPT VISIT MOD MDM: CPT

## 2017-07-06 PROCEDURE — 96374 THER/PROPH/DIAG INJ IV PUSH: CPT

## 2017-07-06 RX ORDER — EPINEPHRINE 0.3 MG/.3ML
0.3 INJECTION SUBCUTANEOUS PRN
Qty: 0.6 ML | Refills: 0 | Status: SHIPPED | OUTPATIENT
Start: 2017-07-06 | End: 2019-07-08

## 2017-07-06 RX ORDER — METHYLPREDNISOLONE SODIUM SUCCINATE 125 MG/2ML
125 INJECTION, POWDER, LYOPHILIZED, FOR SOLUTION INTRAMUSCULAR; INTRAVENOUS ONCE
Status: COMPLETED | OUTPATIENT
Start: 2017-07-06 | End: 2017-07-06

## 2017-07-06 RX ADMIN — METHYLPREDNISOLONE SODIUM SUCCINATE 125 MG: 125 INJECTION, POWDER, FOR SOLUTION INTRAMUSCULAR; INTRAVENOUS at 11:19

## 2017-07-06 ASSESSMENT — ENCOUNTER SYMPTOMS
SHORTNESS OF BREATH: 1
VOMITING: 1
EYE DISCHARGE: 0
NAUSEA: 1
EYE ITCHING: 0
CHEST TIGHTNESS: 1
CONFUSION: 1

## 2017-07-06 NOTE — ED AVS SNAPSHOT
[facility]    [address] [facility] [address]    Phone:  [phone]    Fax:  [phone]                                       Ann Vargas   MRN: [id number]    Department:  [facility]   Date of Visit:  7/6/2017           After Visit Summary Signature Page     I have received my discharge instructions, and my questions have been answered. I have discussed any challenges I see with this plan with the nurse or doctor.    ..........................................................................................................................................  Patient/Patient Representative Signature      ..........................................................................................................................................  Patient Representative Print Name and Relationship to Patient    ..................................................               ................................................  Date                                            Time    ..........................................................................................................................................  Reviewed by Signature/Title    ...................................................              ..............................................  Date                                                            Time

## 2017-07-06 NOTE — DISCHARGE INSTRUCTIONS
Follow up as needed.          Generalized Allergic Reaction (Other)  You are having an allergic reaction. Almost anything can cause one. Different people are allergic to different things. It is usually something that you ate or swallowed, came into contact with by getting or putting it on your skin or clothes, or something you breathed in the air. This can be very annoying and sometimes scary.  Most of us think of allergic reactions when we have a rash or itchy skin. Symptoms can include:    Rash, hives, redness, welts, blisters    Itching, burning, stinging, pain    Dry, flaky, cracking, scaly skin    Swelling of the face, lips or other parts of the body    Hoarse voice    Trouble swallowing, feeling like your throat is closing    Trouble breathing, wheezing    Nausea, vomiting, diarrhea, stomach cramps    Feeling faint or lightheaded, rapid heart rate  Sometimes the cause may be obvious. However, there are so many things that can cause a reaction that you may not be able to figure out. The most important things to help find your allergen are:    Remembering when it started    What you were doing at the time or just before that    Any activities you were involved in    Any new products or contacts  Here are some common causes, but remember almost anything can cause a reaction, and you may not even be aware that you came into contact with one of these things.    Dust, mold, pollen    Plants, such aspoison ivy and poison oak are common ones, but there are many others    Animals    Foods such as shrimp, shellfish, peanuts, milk products, gluten, eggs; also colorings, flavorings, additives    Insect bites or stings such as bees, mosquitos, flees, ticks    Medicines such as penicillin, sulfa drugs, amoxicillin, aspirin, ibuprofen; any medicine can cause a reaction    Jewelry such as nickel, gold (new, or something you ve worn for a while including zippers, and buttons)    Latex such as in gloves, clothes, toys,  balloons, or some tapes (some people allergic to latex may also have problems with foods like bananas, avocados, kiwi, papaya, or chestnuts)    Lotions, perfumes, cosmetics, soaps, shampoos, skincare products, nail products    Chemicals or dyes in clothing, linen, , hair dyes, soaps, iodine  Many viruses and common colds can cause a rash, but is not an allergic reaction. Sometimes it is hard to tell the difference between allergies, sensitivity and intolerance to something. This is especially true with food. Many things can cause diarrhea, vomiting, stomach cramps, and skin irritation.  Home care    The goal of our treatment is to help relieve the symptoms, and get you feeling better. The rash will usually fade over several days, but can sometimes last a couple of weeks. Over the next couple of days, there may be times when it is gets a little worse, and then better again. Here are some things to do:    If you know what you are allergic to, avoid it because future reactions could be worse than this one.    Avoid tight clothing and anything that heats up your skin (hot showers/baths, direct sunlight) since heat will make itching worse.    An ice pack will relieve local areas of intense itching and redness. Don t put the ice directly on the skin, because it can damage the skin. You can also ice put it in a plastic bag. Wrap it in something like a thin towel, kong shirt, or cloth, or use a bag of frozen peas.    Oral Benadryl (diphenhydramine) is an antihistamine available at drug and grocery stores. Unless a prescription antihistamine was given, Benadryl may be used to reduce itching if large areas of the skin are involved. It may make you sleepy, so be careful using it in the daytime or when going to school, working, or driving. [NOTE: Do not use Benadryl if you have glaucoma or if you are a man with trouble urinating due to an enlarged prostate.] There are antihistamines that causes less drowsiness and are  good alternatives for daytime use. Ask your pharmacist for suggestions.    Do not use Benadryl cream on your skin, because in some people it can cause a further reaction, and make you allergic to Benadryl.    Try not to scratch. This can tear the skin and cause an infection.    Using heat-steam to clean your home, using high-efficiency particulate (HEPA) vacuums and filters, avoiding food and pet triggers, exterminating cockroaches, and frequent house cleaning are a few of the strategies used to decrease allergic reactions.  Follow-up care  Follow up with your healthcare provider, or as advised. If you had a severe reaction today, or if you have had several mild-moderate allergic reactions in the past, ask your doctor about allergy testing to find out what you are allergic to. If your reaction included dizziness, fainting or trouble breathing or swallowing, ask your doctor about carrying injectable epinephrine for home use.  Call 911  Call 911 if any of these occur:    Trouble breathing or swallowing, wheezing    Hoarse voice or trouble speaking    Confused    Very drowsy or trouble awakening    Fainting or loss of consciousness    Rapid heart rate    Low blood pressure    Feeling of doom    Nausea, vomiting, abdominal pain, diarrhea    Vomiting blood, or large amounts of blood in stool    Seizure  When to seek medical advice  Call your healthcare provider right away if any of these occur:    Spreading areas of itching, redness or swelling    New or worse swelling in the face, eyelids, lips, mouth, throat or tongue    Dizziness, weakness    Signs of infection:    Spreading redness    Increased pain or swelling    Fever (1 degree above your normal temperature) lasting for 24 to 48 hours  Date Last Reviewed: 7/30/2015 2000-2017 [employer]. [address] [place] [address], [place] [address]. All rights reserved. This information is not intended as a substitute for professional medical care. Always follow your  healthcare professional's instructions.

## 2017-07-06 NOTE — ED PROVIDER NOTES
History     Chief Complaint   Patient presents with     Allergic Reaction     HPI  Ann Vargas is a 51 year old female who presents to the ED via EMS after an apparent allergic reaction. She was eating lemon cake around 9:15 this morning when she broke out in hives. She took 50 mg of benadryl and waited about 10 minutes without any relief. She took another 25 mg of benadryl and then vomited. A colleague at work then administered an  Epipen around 10 am. EMS arrived right after the administration of the Epipen and found her on the floor. She was confused and does not remember most of the event. EMS administered 4 mg Zofran via IV and gave her fluids. Her BP was low when they arrived, but was 122/80 after fluids. Ann denies any known allergies.     Patient Active Problem List   Diagnosis     Chronic rhinitis     Gastroesophageal reflux disease without esophagitis     Hyperlipidemia LDL goal <130     Iritis, recurrent     Breast cancer (H)     Malignant neoplasm of left female breast (HCC)     History of left breast cancer     Morbid obesity, unspecified obesity type (H)     Current Outpatient Prescriptions   Medication Sig Dispense Refill     EPINEPHrine (ADRENACLICK) 0.3 MG/0.3ML injection Inject 0.3 mLs (0.3 mg) into the muscle as needed for anaphylaxis 0.6 mL 0     azithromycin (ZITHROMAX) 250 MG tablet Take 1 tablet (250 mg) by mouth daily 6 tablet 0     simvastatin (ZOCOR) 40 MG tablet Take 1 tablet (40 mg) by mouth At Bedtime 90 tablet 3     omeprazole (PRILOSEC) 40 MG capsule Take 1 capsule (40 mg) by mouth daily Take 30-60 minutes before a meal. 90 capsule 3     montelukast (SINGULAIR) 10 MG tablet Take 1 tablet (10 mg) by mouth At Bedtime 90 tablet 3     fluticasone (FLONASE) 50 MCG/ACT spray Spray 1-2 sprays into both nostrils daily 3 Bottle 11     ASPIRIN PO Take 650 mg by mouth as needed for moderate pain        anastrozole (ARIMIDEX) 1 MG tablet Take 1 tablet (1 mg) by mouth daily 90  tablet 3     EPINEPHrine (EPIPEN) 0.3 MG/0.3ML injection Inject 0.3 mLs (0.3 mg) into the muscle once as needed for anaphylaxis (Patient not taking: Reported on 4/17/2017) 1 each 0     DiphenhydrAMINE HCl (BENADRYL PO) Take 25 mg by mouth every 4 hours as needed for itching (Hives)       VENTOLIN  (90 BASE) MCG/ACT inhaler Inhale 1 puff into the lungs every 4 hours as needed (Patient not taking: Reported on 4/17/2017) 1 Inhaler 3     loteprednol (LOTEMAX) 0.5 % ophthalmic suspension Place 1 drop into both eyes daily as needed.       Allergies   Allergen Reactions     Nka [No Known Allergies]          I have reviewed the Medications, Allergies, Past Medical and Surgical History, and Social History in the Epic system.         Review of Systems   Eyes: Negative for discharge and itching.   Respiratory: Positive for chest tightness and shortness of breath.    Gastrointestinal: Positive for nausea and vomiting.   Skin: Positive for rash.   Psychiatric/Behavioral: Positive for confusion.   All other systems reviewed and are negative.      Physical Exam      Physical Exam  /90  Temp 97.6  F (36.4  C)  Resp 20  LMP 02/17/2015  SpO2 96%  General: alert, interactive, in no apparent distress  Head: atraumatic  Nose: no rhinorrhea or epistaxis  Ears: no external auditory canal discharge or bleeding.    Eyes: Sclera nonicteric. Conjunctiva noninjected. PERRL, EOMI  Mouth: no tonsillar erythema, edema, or exudate  Neck: supple, no palp LAD  Lungs: CTAB  CV: RRR, S1/S2; peripheral pulses palpable and symmetric  Abdomen: soft, nt, nd, no guarding or rebound. Positive bowel sounds  Extremities: no cyanosis or edema  Skin: no rash or diaphoresis  Neuro: CN II-XII grossly intact, strength 5/5 in UE and LEs bilaterally, sensation intact to light touch in UE and LEs bilaterally;       ED Course     ED Course     Procedures             Critical Care time:  none               Labs Ordered and Resulted from Time of ED  Arrival Up to the Time of Departure from the ED - No data to display  No results found for this or any previous visit (from the past 24 hour(s)).    Medications   methylPREDNISolone sodium succinate (solu-MEDROL) injection 125 mg (125 mg Intravenous Given 7/6/17 1119)       11:11 AM Patient Assessed    Assessments & Plan (with Medical Decision Making)  51 year old female , with past medical history significant for previous allergies, presenting to the emergency department via EMS after the patient was eating lemon bread this morning, and subsequently began experiencing hives.  She did have episode of vomiting, and complained of mild abdominal pains that occurred prior to arrival.  Patient self administered 75 mg of oral Benadryl, and did use an epinephrine pen prior to arrival.  This all began approximately 2 hours prior to emergency department arrival.  Patient arrives sleepy in appearance.  Skin is normal, with no rash, or hives.  Lungs are clear, with no wheezing.  Oropharynx is clear, with no obvious swelling or edema which is present.    Patient with likely anaphylactic reaction which occurred prior given the vomiting, abdominal pain, wheezing, and hives.  However, patient did receive appropriate treatment, and is asymptomatic at time of ED arrival.  Monitored for multiple hours in the emergency department without recurrence of any reaction.  She is given prescription for EpiPen.  Patient has had follow-up previously with allergy providers.  Declined repeat referral.       I have reviewed the nursing notes.    I have reviewed the findings, diagnosis, plan and need for follow up with the patient.       New Prescriptions    EPINEPHRINE (ADRENACLICK) 0.3 MG/0.3ML INJECTION    Inject 0.3 mLs (0.3 mg) into the muscle as needed for anaphylaxis       Final diagnoses:   Allergic reaction, initial encounter     This document serves as a record of the services and decisions personally performed and made by Shawn Slater  MD Sivakumar. It was created on HIS/HER behalf by Kathleen Isidro, a trained medical scribe. The creation of this document is based the provider's statements to the medical scribe.  Kathleen Isidro 11:11 AM 7/6/2017    Provider:   The information in this document, created by the medical scribe for me, accurately reflects the services I personally performed and the decisions made by me. I have reviewed and approved this document for accuracy prior to leaving the patient care area.  Shawn Slater MD 11:11 AM 7/6/2017 7/6/2017   [facility] EMERGENCY DEPARTMENT     Shawn Slater MD  07/06/17 0647

## 2017-07-06 NOTE — ED NOTES
Patient was at work today and was eating lemon bread and began getting hives afterwards, took some benadryl and used her epi pen and called 911 and came here for further eval. She denies any resp distress, states that she just feels 'crummy'

## 2017-07-06 NOTE — ED AVS SNAPSHOT
[facility] Emergency Department    [address] [address]    Phone:  [phone]    Fax:  [phone]                                       Ann Vargas   MRN: [id number]    Department:  [facility] Emergency Department   Date of Visit:  7/6/2017           Patient Information     Date Of Birth          1965        Your diagnoses for this visit were:     Allergic reaction, initial encounter        You were seen by Shawn Slater MD.        Discharge Instructions       Follow up as needed.          Generalized Allergic Reaction (Other)  You are having an allergic reaction. Almost anything can cause one. Different people are allergic to different things. It is usually something that you ate or swallowed, came into contact with by getting or putting it on your skin or clothes, or something you breathed in the air. This can be very annoying and sometimes scary.  Most of us think of allergic reactions when we have a rash or itchy skin. Symptoms can include:    Rash, hives, redness, welts, blisters    Itching, burning, stinging, pain    Dry, flaky, cracking, scaly skin    Swelling of the face, lips or other parts of the body    Hoarse voice    Trouble swallowing, feeling like your throat is closing    Trouble breathing, wheezing    Nausea, vomiting, diarrhea, stomach cramps    Feeling faint or lightheaded, rapid heart rate  Sometimes the cause may be obvious. However, there are so many things that can cause a reaction that you may not be able to figure out. The most important things to help find your allergen are:    Remembering when it started    What you were doing at the time or just before that    Any activities you were involved in    Any new products or contacts  Here are some common causes, but remember almost anything can cause a reaction, and you may not even be aware that you came into contact with one of these things.    Dust, mold, pollen    Plants, such aspoison  ivy and poison oak are common ones, but there are many others    Animals    Foods such as shrimp, shellfish, peanuts, milk products, gluten, eggs; also colorings, flavorings, additives    Insect bites or stings such as bees, mosquitos, flees, ticks    Medicines such as penicillin, sulfa drugs, amoxicillin, aspirin, ibuprofen; any medicine can cause a reaction    Jewelry such as nickel, gold (new, or something you ve worn for a while including zippers, and buttons)    Latex such as in gloves, clothes, toys, balloons, or some tapes (some people allergic to latex may also have problems with foods like bananas, avocados, kiwi, papaya, or chestnuts)    Lotions, perfumes, cosmetics, soaps, shampoos, skincare products, nail products    Chemicals or dyes in clothing, linen, , hair dyes, soaps, iodine  Many viruses and common colds can cause a rash, but is not an allergic reaction. Sometimes it is hard to tell the difference between allergies, sensitivity and intolerance to something. This is especially true with food. Many things can cause diarrhea, vomiting, stomach cramps, and skin irritation.  Home care    The goal of our treatment is to help relieve the symptoms, and get you feeling better. The rash will usually fade over several days, but can sometimes last a couple of weeks. Over the next couple of days, there may be times when it is gets a little worse, and then better again. Here are some things to do:    If you know what you are allergic to, avoid it because future reactions could be worse than this one.    Avoid tight clothing and anything that heats up your skin (hot showers/baths, direct sunlight) since heat will make itching worse.    An ice pack will relieve local areas of intense itching and redness. Don t put the ice directly on the skin, because it can damage the skin. You can also ice put it in a plastic bag. Wrap it in something like a thin towel, kong shirt, or cloth, or use a bag of frozen  peas.    Oral Benadryl (diphenhydramine) is an antihistamine available at drug and grocery stores. Unless a prescription antihistamine was given, Benadryl may be used to reduce itching if large areas of the skin are involved. It may make you sleepy, so be careful using it in the daytime or when going to school, working, or driving. [NOTE: Do not use Benadryl if you have glaucoma or if you are a man with trouble urinating due to an enlarged prostate.] There are antihistamines that causes less drowsiness and are good alternatives for daytime use. Ask your pharmacist for suggestions.    Do not use Benadryl cream on your skin, because in some people it can cause a further reaction, and make you allergic to Benadryl.    Try not to scratch. This can tear the skin and cause an infection.    Using heat-steam to clean your home, using high-efficiency particulate (HEPA) vacuums and filters, avoiding food and pet triggers, exterminating cockroaches, and frequent house cleaning are a few of the strategies used to decrease allergic reactions.  Follow-up care  Follow up with your healthcare provider, or as advised. If you had a severe reaction today, or if you have had several mild-moderate allergic reactions in the past, ask your doctor about allergy testing to find out what you are allergic to. If your reaction included dizziness, fainting or trouble breathing or swallowing, ask your doctor about carrying injectable epinephrine for home use.  Call 911  Call 911 if any of these occur:    Trouble breathing or swallowing, wheezing    Hoarse voice or trouble speaking    Confused    Very drowsy or trouble awakening    Fainting or loss of consciousness    Rapid heart rate    Low blood pressure    Feeling of doom    Nausea, vomiting, abdominal pain, diarrhea    Vomiting blood, or large amounts of blood in stool    Seizure  When to seek medical advice  Call your healthcare provider right away if any of these occur:    Spreading areas  of itching, redness or swelling    New or worse swelling in the face, eyelids, lips, mouth, throat or tongue    Dizziness, weakness    Signs of infection:    Spreading redness    Increased pain or swelling    Fever (1 degree above your normal temperature) lasting for 24 to 48 hours  Date Last Reviewed: 7/30/2015 2000-2017 [email or web address]. [address] [place] [address]. All rights reserved. This information is not intended as a substitute for professional medical care. Always follow your healthcare professional's instructions.          Future Appointments        Provider Department Dept Phone Center    7/10/2017 8:00 AM [facility] MAMMO ROOM 2 [facility] [phone] [facility]    10/9/2017 5:00 PM CL Lab [facility] [phone] [facility]    10/16/2017 3:00 PM Sunita Baird MD, MD [facility] [phone] [facility]      24 Hour Appointment Hotline       To make an appointment at any [facility], call [phone] ([phone]). If you don't have a family doctor or clinic, we will help you find one. [facility] are conveniently located to serve the needs of you and your family.             Review of your medicines      CONTINUE these medicines which may have CHANGED, or have new prescriptions. If we are uncertain of the size of tablets/capsules you have at home, strength may be listed as something that might have changed.        Dose / Directions Last dose taken    * EPINEPHrine 0.3 MG/0.3ML injection   Dose:  0.3 mg   What changed:  Another medication with the same name was added. Make sure you understand how and when to take each.   Quantity:  1 each        Inject 0.3 mLs (0.3 mg) into the muscle once as needed for anaphylaxis   Refills:  0        * EPINEPHrine 0.3 MG/0.3ML injection   Commonly known as:  ADRENACLICK   Dose:  0.3 mg   What changed:  You were already taking a medication with the same name, and this prescription was  added. Make sure you understand how and when to take each.   Quantity:  0.6 mL        Inject 0.3 mLs (0.3 mg) into the muscle as needed for anaphylaxis   Refills:  0        * Notice:  This list has 2 medication(s) that are the same as other medications prescribed for you. Read the directions carefully, and ask your doctor or other care provider to review them with you.      Our records show that you are taking the medicines listed below. If these are incorrect, please call your family doctor or clinic.        Dose / Directions Last dose taken    anastrozole 1 MG tablet   Commonly known as:  ARIMIDEX   Dose:  1 mg   Quantity:  90 tablet        Take 1 tablet (1 mg) by mouth daily   Refills:  3        ASPIRIN PO   Dose:  650 mg        Take 650 mg by mouth as needed for moderate pain   Refills:  0        azithromycin 250 MG tablet   Commonly known as:  ZITHROMAX   Dose:  250 mg   Quantity:  6 tablet        Take 1 tablet (250 mg) by mouth daily   Refills:  0        BENADRYL PO   Dose:  25 mg        Take 25 mg by mouth every 4 hours as needed for itching (Hives)   Refills:  0        fluticasone 50 MCG/ACT spray   Commonly known as:  FLONASE   Dose:  1-2 spray   Quantity:  3 Bottle        Spray 1-2 sprays into both nostrils daily   Refills:  11        LOTEMAX 0.5 % ophthalmic susp   Dose:  1 drop   Generic drug:  loteprednol        Place 1 drop into both eyes daily as needed.   Refills:  0        montelukast 10 MG tablet   Commonly known as:  SINGULAIR   Dose:  10 mg   Quantity:  90 tablet        Take 1 tablet (10 mg) by mouth At Bedtime   Refills:  3        omeprazole 40 MG capsule   Commonly known as:  priLOSEC   Dose:  40 mg   Quantity:  90 capsule        Take 1 capsule (40 mg) by mouth daily Take 30-60 minutes before a meal.   Refills:  3        simvastatin 40 MG tablet   Commonly known as:  ZOCOR   Dose:  40 mg   Quantity:  90 tablet        Take 1 tablet (40 mg) by mouth At Bedtime   Refills:  3        VENTOLIN HFA  108 (90 BASE) MCG/ACT Inhaler   Dose:  1 puff   Quantity:  1 Inhaler   Generic drug:  albuterol        Inhale 1 puff into the lungs every 4 hours as needed   Refills:  3                Prescriptions were sent or printed at these locations (1 Prescription)                   Other Prescriptions                Printed at Department/Unit printer (1 of 1)         EPINEPHrine (ADRENACLICK) 0.3 MG/0.3ML injection                Procedures and tests performed during your visit     Pulse oximetry nursing      Orders Needing Specimen Collection     None      Pending Results     No orders found from 7/4/2017 to 7/7/2017.            Pending Culture Results     No orders found from 7/4/2017 to 7/7/2017.            Pending Results Instructions     If you had any lab results that were not finalized at the time of your Discharge, you can call the ED Lab Result RN at [phone]. You will be contacted by this team for any positive Lab results or changes in treatment. The nurses are available 7 days a week from 10A to 6:30P.  You can leave a message 24 hours per day and they will return your call.        Test Results From Your Hospital Stay               Thank you for choosing [facility]       Thank you for choosing [facility] for your care. Our goal is always to provide you with excellent care. Hearing back from our patients is one way we can continue to improve our services. Please take a few minutes to complete the written survey that you may receive in the mail after you visit with us. Thank you!        [facility] Information     [facility] gives you secure access to your electronic health record. If you see a primary care provider, you can also send messages to your care team and make appointments. If you have questions, please call your primary care clinic.  If you do not have a primary care provider, please call [phone] and they will assist you.        Care EveryWhere ID     This is your Care EveryWhere ID. This could be used by  other organizations to access your [facility] medical records  [id number]        Equal Access to Services     KATHRYN HOWARD : Shereen Acharya, kaya mooney, blanca hawthorne. So [facility] [phone].    ATENCIÓN: Si habla español, tiene a priest disposición servicios gratuitos de asistencia lingüística. Llame al [phone].    We comply with applicable federal civil rights laws and [place] laws. We do not discriminate on the basis of race, color, national origin, age, disability sex, sexual orientation or gender identity.            After Visit Summary       This is your record. Keep this with you and show to your community pharmacist(s) and doctor(s) at your next visit.

## 2017-07-10 ENCOUNTER — HOSPITAL ENCOUNTER (OUTPATIENT)
Dept: MAMMOGRAPHY | Facility: CLINIC | Age: 52
Discharge: HOME | End: 2017-07-10
Attending: INTERNAL MEDICINE | Admitting: INTERNAL MEDICINE
Payer: COMMERCIAL

## 2017-07-10 DIAGNOSIS — Z85.3 PERSONAL HISTORY OF MALIGNANT NEOPLASM OF BREAST: ICD-10-CM

## 2017-07-10 PROCEDURE — 77063 BREAST TOMOSYNTHESIS BI: CPT

## 2017-07-10 PROCEDURE — G0202 SCR MAMMO BI INCL CAD: HCPCS

## 2017-10-09 DIAGNOSIS — Z85.3 PERSONAL HISTORY OF MALIGNANT NEOPLASM OF BREAST: ICD-10-CM

## 2017-10-09 PROCEDURE — 80076 HEPATIC FUNCTION PANEL: CPT | Performed by: INTERNAL MEDICINE

## 2017-10-09 PROCEDURE — 36415 COLL VENOUS BLD VENIPUNCTURE: CPT | Performed by: INTERNAL MEDICINE

## 2017-10-09 PROCEDURE — 86300 IMMUNOASSAY TUMOR CA 15-3: CPT | Performed by: INTERNAL MEDICINE

## 2017-10-10 LAB
ALBUMIN SERPL-MCNC: 3.4 G/DL (ref 3.4–5)
ALP SERPL-CCNC: 126 U/L (ref 40–150)
ALT SERPL W P-5'-P-CCNC: 34 U/L (ref 0–50)
AST SERPL W P-5'-P-CCNC: 20 U/L (ref 0–45)
BILIRUB DIRECT SERPL-MCNC: <0.1 MG/DL (ref 0–0.2)
BILIRUB SERPL-MCNC: 0.3 MG/DL (ref 0.2–1.3)
CANCER AG27-29 SERPL-ACNC: 12 U/ML (ref 0–39)
PROT SERPL-MCNC: 7.4 G/DL (ref 6.8–8.8)

## 2017-10-16 ENCOUNTER — ONCOLOGY VISIT (OUTPATIENT)
Dept: ONCOLOGY | Facility: CLINIC | Age: 52
End: 2017-10-16
Attending: INTERNAL MEDICINE
Payer: COMMERCIAL

## 2017-10-16 ENCOUNTER — ALLIED HEALTH/NURSE VISIT (OUTPATIENT)
Dept: FAMILY MEDICINE | Facility: CLINIC | Age: 52
End: 2017-10-16
Payer: COMMERCIAL

## 2017-10-16 VITALS
SYSTOLIC BLOOD PRESSURE: 141 MMHG | BODY MASS INDEX: 41.07 KG/M2 | RESPIRATION RATE: 14 BRPM | WEIGHT: 246.5 LBS | HEART RATE: 97 BPM | TEMPERATURE: 98.5 F | HEIGHT: 65 IN | DIASTOLIC BLOOD PRESSURE: 93 MMHG

## 2017-10-16 DIAGNOSIS — Z78.0 MENOPAUSE: ICD-10-CM

## 2017-10-16 DIAGNOSIS — Z23 NEED FOR PROPHYLACTIC VACCINATION AND INOCULATION AGAINST INFLUENZA: Primary | ICD-10-CM

## 2017-10-16 DIAGNOSIS — Z85.3 HISTORY OF LEFT BREAST CANCER: Primary | ICD-10-CM

## 2017-10-16 DIAGNOSIS — I10 ESSENTIAL HYPERTENSION: ICD-10-CM

## 2017-10-16 PROCEDURE — 99211 OFF/OP EST MAY X REQ PHY/QHP: CPT

## 2017-10-16 PROCEDURE — 90471 IMMUNIZATION ADMIN: CPT

## 2017-10-16 PROCEDURE — 90686 IIV4 VACC NO PRSV 0.5 ML IM: CPT

## 2017-10-16 PROCEDURE — 99213 OFFICE O/P EST LOW 20 MIN: CPT | Performed by: INTERNAL MEDICINE

## 2017-10-16 PROCEDURE — 99207 ZZC NO CHARGE NURSE ONLY: CPT

## 2017-10-16 RX ORDER — OMEPRAZOLE 40 MG/1
40 CAPSULE, DELAYED RELEASE ORAL DAILY
COMMUNITY
Start: 2017-09-22 | End: 2018-04-16

## 2017-10-16 ASSESSMENT — PAIN SCALES - GENERAL: PAINLEVEL: NO PAIN (0)

## 2017-10-16 NOTE — PROGRESS NOTES
"Primary PHYSICIAN:  Cydney Garcia MD      CHIEF COMPLAINT AND REASON FOR Visit:   left breast cancer T1bN0 s/p lumpectomy 11/2014, RT 1/2015.      HISTORY OF PRESENT ILLNESS:  Ann Vargas is a 48-year-old peromenopausal woman had her screening mammogram 09/2014,  found to have new microcalcification in the left upper outer quadrant of the breast around 1 o'clock position 8 cm from the nipple.  Stereotactic biopsy on the left upper outer quadrant of the breast microcalcifications sites, one is negative.  The other one turned out to be invasive ductal cancer, grade 1, negative lymphovascular invasion, no DCIS, ER/% positive, HER-2/seth is negative.  She  did not feel breast mass, no skin changes.  She had post-biopsy hematoma.  Left lumpectomy 11/19/2014 found 5 mm IDC, Grade I, 2 sLN negative, clear margin. She has pT1bN0 disease.    She finished RT 1/2015. Tamoxifen was advised after. LMP 1/2015. It is changed to Arimidex 4/2016.        OTHER MEDICAL PROBLEMS:  Obesity, hyperlipidemia, GERD.   OCP for yrs d/c 5/2014 found to be post menopausal by blood work in 6/2014      MEDICATIONS:  Reviewed in Epic system.      SOCIAL HISTORY:  She lives in [place].  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  Denies smoking or alcohol abuse.      FAMILY HISTORY:  One great-grandmother had breast cancer, she was a survivor.  One grandmother had disease from ovarian cancer.      REVIEW OF SYSTEMS:   She had negative endometrium biopsy, her LMP remains to be 1/2015.   She has hard time losing her weight.   No more chest wall or breast complains.      PHYSICAL EXAMINATION:   VITAL SIGNS: Blood pressure (!) 141/93, pulse 97, temperature 98.5  F (36.9  C), temperature source Tympanic, resp. rate 14, height 1.657 m (5' 5.25\"), weight 111.8 kg (246 lb 8 oz), last menstrual period 02/17/2015, not currently breastfeeding.  GENERAL APPEARANCE:  Middle aged woman looks like stated age.  She is a little bit anxious.  She is " "morbidly obese.  Not in acute distress.   HEENT: The patient is normocephalic, atraumatic. Pupils are equally reactive to light.  Sclerae are anicteric.  Moist oral mucosa.  Negative pharynx.  No oral thrush. Left iritis just saw \"total eye\".  NECK:  Supple.  No jugular venous distention.  Thyroid is not palpable.   LYMPH NODES:  Superficial lymphadenopathy is not appreciable in the bilateral cervical, supraclavicular, axillary or inguinal areas.   CARDIOVASCULAR:  S1, S2 regular with no murmurs or gallops.  No carotid or abdominal bruits.   PULMONARY:  Lungs are clear to auscultation and percussion bilaterally.  There is no wheezing or rhonchi.   GASTROINTESTINAL:  Abdomen is soft, nontender.  No hepatosplenomegaly.  No signs of ascites.  No mass appreciable.   MUSCULOSKELETAL/EXTREMITIES:  No edema.  No cyanotic changes.  No signs of joint deformity.  No lymphedema.   NEUROLOGIC:  Cranial nerves II-XII are grossly intact.  Sensation intact.  Muscle strength and muscle tone symmetrical, 5/5 throughout.   BACK:  No spinal or paraspinal tenderness.  No CVA tenderness.   SKIN:  No petechiae.  No rash.  No signs of cellulitis.   BREASTS:  Bilateral breast exam review.  Bilateral oversized symmetrical breasts.  Right side is clear.  Left side has well healed lumpectomy and sentinel LN scar.      CURRENT DATA REVIEWED  LFT/marker are good.      Current Image Reviewed  3D  MA 7/2017: negative      Old [facility] reviewed with [facility]  Left lumpectomy 11/19/2014 found 5 mm IDC, Grade I, 2 sLN negative, clear margin. She has pT1bN0 disease.    Biopsy 10/29/2014- tereotactic biopsy on the left upper outer quadrant of the breast microcalcifications sites, one is negative.  The other one turned out to be invasive ductal cancer, grade 1, negative lymphovascular invasion, no DCIS, ER/% positive, HER-2/seth is negative.    Dexa 12/2014: nl gone desity  Bone scan 12/2014: no mets         ASSESSMENT AND PLAN:    1.  left " infiltrating ductal cancer, grade 1, ER/% positive, HER 2 negative, negative lymphovascular invasion, no DCIS, diagnosed through screening mammogram.  S/p lumpectomy 11/2014 found 5 mm GI, IDC, 2LN negative, with clear margin. She has pT1bN0 disease.   She finished RT 1/2015.      Tamoxifen was advised after. LMP 1/2015. It was changed to Arimidex 4/2016. We talked about the side effects and how she needs to be followed up.    We discussed the longer anti hormone data. She is open either way.   She is due 6 months f/u with labs.     2. Baseline obesity. She is gaining weight now. Weight loss counseling is provided.   She is informed post menopausal obesity is a risk factor for breast cancer.     3. HTN - she likely has white coated HTN. She refuses med for now. She attributes it to stress.

## 2017-10-16 NOTE — NURSING NOTE
"Oncology Rooming Note    October 16, 2017 3:07 PM   Ann Vargas is a 51 year old female who presents for:    Chief Complaint   Patient presents with     Oncology Clinic Visit     f/u breast cancer and review lab results     Initial Vitals: BP (!) 141/93 (BP Location: Right arm, Patient Position: Chair, Cuff Size: Adult Large)  Pulse 97  Temp 98.5  F (36.9  C) (Tympanic)  Resp 14  Ht 1.657 m (5' 5.25\")  Wt 111.8 kg (246 lb 8 oz)  LMP 02/17/2015  BMI 40.71 kg/m2 Estimated body mass index is 40.71 kg/(m^2) as calculated from the following:    Height as of this encounter: 1.657 m (5' 5.25\").    Weight as of this encounter: 111.8 kg (246 lb 8 oz). Body surface area is 2.27 meters squared.  No Pain (0) Comment: Data Unavailable   Patient's last menstrual period was 02/17/2015.  Allergies reviewed: Yes  Medications reviewed: Yes    Medications: Medication refills not needed today.  Pharmacy name entered into EPIC:    [facility]  [facility] - [place], [place] - [address] [facility] - [place], [place] - [address]    Clinical concerns: Patient presents today in f/u of breast cancer and to review lab results.  Patient reports she experienced an anaphylactic reaction a few months ago and feels she \"narrowed it down to yellow dye #5 and #6\".  She now carries an epi pen. Allergy list updated. No other c/o at this time.  Dr. Baird was notified.    10 minutes for nursing intake (face to face time)     Niya Stacy RN              "

## 2017-10-16 NOTE — PROGRESS NOTES
Injectable Influenza Immunization Documentation    1.  Is the person to be vaccinated sick today?   No    2. Does the person to be vaccinated have an allergy to a component   of the vaccine?   No    3. Has the person to be vaccinated ever had a serious reaction   to influenza vaccine in the past?   No    4. Has the person to be vaccinated ever had Guillain-Barré syndrome?   No    Form completed by Mary Kay Sams CMA  Per orders of Dr. Guardado, injection of flu vaccine given by Mary Kay Sams. Patient instructed to remain in clinic for 15 minutes afterwards, and to report any adverse reaction to me immediately.

## 2017-10-16 NOTE — MR AVS SNAPSHOT
After Visit Summary   10/16/2017    Ann Vargas    MRN: [id number]           Patient Information     Date Of Birth          1965        Visit Information        Provider Department      10/16/2017 4:15 PM [facility]        Today's Diagnoses     Need for prophylactic vaccination and inoculation against influenza    -  1       Follow-ups after your visit        Your next 10 appointments already scheduled     Oct 16, 2017  3:00 PM CDT   Return Visit with Sunita Baird MD   [facility] ([facility])    [facility]  [address]  [facility] [address]   [phone]            Oct 16, 2017  4:15 PM CDT   Nurse Only with [facility]   [facility] ([facility])    [address] [place]  [facility] [address] [phone]              Who to contact     If you have questions or need follow up information about today's clinic visit or your schedule please contact [facility] directly at [phone].  Normal or non-critical lab and imaging results will be communicated to you by [employer], letter or phone within 4 business days after the clinic has received the results. If you do not hear from us within 7 days, please contact the clinic through [employer] or phone. If you have a critical or abnormal lab result, we will notify you by phone as soon as possible.  Submit refill requests through [employer] or call your pharmacy and they will forward the refill request to us. Please allow 3 business days for your refill to be completed.          Additional Information About Your Visit        [employer] Information     [employer] gives you secure access to your electronic health record. If you see a primary care provider, you can also send messages to your care team and make appointments. If you have questions, please call your primary care clinic.  If you do not have a primary care  provider, please call [phone] and they will assist you.        Care EveryWhere ID     This is your Care EveryWhere ID. This could be used by other organizations to access your [facility] medical records  [id number]        Your Vitals Were     Last Period                   02/17/2015            Blood Pressure from Last 3 Encounters:   07/06/17 135/90   04/17/17 144/79   03/29/17 134/89    Weight from Last 3 Encounters:   04/17/17 240 lb (108.9 kg)   03/29/17 237 lb (107.5 kg)   10/17/16 238 lb 11.2 oz (108.3 kg)              We Performed the Following     FLU VAC, SPLIT VIRUS IM > 3 YO (QUADRIVALENT) [90211]     Vaccine Administration, Initial [24775]        Primary Care Provider Office Phone # Fax #    Cydney Garcia [phone] [address] [facility] [address]        Equal Access to Services     KATHRYN HOWARD AH: Hadii aad ku hadasho Soomaali, waaxda luqadaha, qaybta kaalmada adeegyada, waxay carinin haykd george . So [facility] [phone].    ATENCIÓN: Si habla español, tiene a priest disposición servicios gratuitos de asistencia lingüística. Llame al [phone].    We comply with applicable federal civil rights laws and [place] laws. We do not discriminate on the basis of race, color, national origin, age, disability, sex, sexual orientation, or gender identity.            Thank you!     Thank you for choosing [facility]  for your care. Our goal is always to provide you with excellent care. Hearing back from our patients is one way we can continue to improve our services. Please take a few minutes to complete the written survey that you may receive in the mail after your visit with us. Thank you!             Your Updated Medication List - Protect others around you: Learn how to safely use, store and throw away your medicines at www.disposemymeds.org.          This list is accurate as of: 10/16/17  2:41 PM.  Always use your most recent med list.                    Brand Name Dispense Instructions for use Diagnosis    anastrozole 1 MG tablet    ARIMIDEX    90 tablet    Take 1 tablet (1 mg) by mouth daily    Personal history of malignant neoplasm of breast       ASPIRIN PO      Take 650 mg by mouth as needed for moderate pain        azithromycin 250 MG tablet    ZITHROMAX    6 tablet    Take 1 tablet (250 mg) by mouth daily    Cough       BENADRYL PO      Take 25 mg by mouth every 4 hours as needed for itching (Hives)    Breast cancer, left (H)       * EPINEPHrine 0.3 MG/0.3ML injection 2-pack    EPIPEN/ADRENACLICK/or ANY BX GENERIC EQUIV    1 each    Inject 0.3 mLs (0.3 mg) into the muscle once as needed for anaphylaxis        * EPINEPHrine 0.3 MG/0.3ML injection 2-pack    ADRENACLICK    0.6 mL    Inject 0.3 mLs (0.3 mg) into the muscle as needed for anaphylaxis        fluticasone 50 MCG/ACT spray    FLONASE    3 Bottle    Spray 1-2 sprays into both nostrils daily    Chronic rhinitis       LOTEMAX 0.5 % ophthalmic susp   Generic drug:  loteprednol      Place 1 drop into both eyes daily as needed.        montelukast 10 MG tablet    SINGULAIR    90 tablet    Take 1 tablet (10 mg) by mouth At Bedtime    Chronic rhinitis       omeprazole 40 MG capsule    priLOSEC    90 capsule    Take 1 capsule (40 mg) by mouth daily Take 30-60 minutes before a meal.    Gastroesophageal reflux disease without esophagitis       simvastatin 40 MG tablet    ZOCOR    90 tablet    Take 1 tablet (40 mg) by mouth At Bedtime    Hyperlipidemia LDL goal <130       VENTOLIN  (90 BASE) MCG/ACT Inhaler   Generic drug:  albuterol     1 Inhaler    Inhale 1 puff into the lungs every 4 hours as needed    Wheezing       * Notice:  This list has 2 medication(s) that are the same as other medications prescribed for you. Read the directions carefully, and ask your doctor or other care provider to review them with you.

## 2017-10-16 NOTE — MR AVS SNAPSHOT
After Visit Summary   10/16/2017    Ann Vargas    MRN: [id number]           Patient Information     Date Of Birth          1965        Visit Information        Provider Department      10/16/2017 3:00 PM Sunita Baird MD [facility]        Today's Diagnoses     History of left breast cancer    -  1    Menopause        Essential hypertension          Care Instructions    Due Dexa.   6 months f/u with labs          Follow-ups after your visit        Your next 10 appointments already scheduled     Oct 16, 2017  4:15 PM CDT   Nurse Only with [place] [facility]   [facility] ([facility]    [address] [address]   [phone]            Nov 21, 2017  8:30 AM CST   DX HIP/PELVIS/SPINE with [place]DX1   [facility] ([facility])    [address] [address]   [phone]           Please do not take any of the following 24 hours prior to the day of your exam: vitamins, calcium tablets, antacids.  If possible, please wear clothes without metal (snaps, zippers). A sweatsuit works well.            Apr 09, 2018  5:30 PM CDT   LAB with [facility] LAB   [facility] ([facility]    [address]  [facility] [address]   [phone]           Patient must bring picture ID. Patient should be prepared to give a urine specimen  Please do not eat 10-12 hours before your appointment if you are coming in fasting for labs on lipids, cholesterol, or glucose (sugar). Pregnant women should follow their Care Team instructions. Water with medications is okay. Do not drink coffee or other fluids. If you have concerns about taking  your medications, please ask at office or if scheduling via NiftyThriftyt, send a message by clicking on Secure Messaging, Message Your Care Team.            Apr 16, 2018  3:15 PM CDT   Return Visit with Sunita Baird MD   [facility] ([facility]  "Hospital)    [facility]  [address]  [facility] [address]   [phone]              Future tests that were ordered for you today     Open Future Orders        Priority Expected Expires Ordered    DX Hip/Pelvis/Spine Routine  10/16/2018 10/16/2017    Ca27.29  breast tumor marker Routine 4/1/2018 5/31/2018 10/16/2017    Comprehensive metabolic panel Routine 4/1/2018 5/31/2018 10/16/2017            Who to contact     If you have questions or need follow up information about today's clinic visit or your schedule please contact [facility] directly at [phone].  Normal or non-critical lab and imaging results will be communicated to you by [employer], letter or phone within 4 business days after the clinic has received the results. If you do not hear from us within 7 days, please contact the clinic through [employer] or phone. If you have a critical or abnormal lab result, we will notify you by phone as soon as possible.  Submit refill requests through [employer] or call your pharmacy and they will forward the refill request to us. Please allow 3 business days for your refill to be completed.          Additional Information About Your Visit        [employer] Information     [employer] gives you secure access to your electronic health record. If you see a primary care provider, you can also send messages to your care team and make appointments. If you have questions, please call your primary care clinic.  If you do not have a primary care provider, please call [phone] and they will assist you.        Care EveryWhere ID     This is your Care EveryWhere ID. This could be used by other organizations to access your [place] medical records  [id number]        Your Vitals Were     Pulse Temperature Respirations Height Last Period BMI (Body Mass Index)    97 98.5  F (36.9  C) (Tympanic) 14 1.657 m (5' 5.25\") 02/17/2015 40.71 kg/m2       Blood Pressure from Last 3 Encounters: "   10/16/17 (!) 141/93   07/06/17 135/90   04/17/17 144/79    Weight from Last 3 Encounters:   10/16/17 111.8 kg (246 lb 8 oz)   04/17/17 108.9 kg (240 lb)   03/29/17 107.5 kg (237 lb)               Primary Care Provider Office Phone # Fax #    Cydney Garcia [phone] [address] [facility] [address]        Equal Access to Services     KATHRYN HOWARD : Hadii aad ku hadasho Soomaali, waaxda luqadaha, qaybta kaalmada adeegyada, waxay idiin hayaan adejayda george . So [facility] [phone].    ATENCIÓN: Si habla español, tiene a priest disposición servicios gratuitos de asistencia lingüística. Llame al [phone].    We comply with applicable federal civil rights laws and [place] laws. We do not discriminate on the basis of race, color, national origin, age, disability, sex, sexual orientation, or gender identity.            Thank you!     Thank you for choosing [facility]  for your care. Our goal is always to provide you with excellent care. Hearing back from our patients is one way we can continue to improve our services. Please take a few minutes to complete the written survey that you may receive in the mail after your visit with us. Thank you!             Your Updated Medication List - Protect others around you: Learn how to safely use, store and throw away your medicines at www.disposemymeds.org.          This list is accurate as of: 10/16/17  3:42 PM.  Always use your most recent med list.                   Brand Name Dispense Instructions for use Diagnosis    anastrozole 1 MG tablet    ARIMIDEX    90 tablet    Take 1 tablet (1 mg) by mouth daily    Personal history of malignant neoplasm of breast       ASPIRIN PO      Take 650 mg by mouth as needed for moderate pain        azithromycin 250 MG tablet    ZITHROMAX    6 tablet    Take 1 tablet (250 mg) by mouth daily    Cough       BENADRYL PO      Take 25 mg by mouth every 4 hours as needed for itching (Hives)    Breast  cancer, left (H)       * EPINEPHrine 0.3 MG/0.3ML injection 2-pack    EPIPEN/ADRENACLICK/or ANY BX GENERIC EQUIV    1 each    Inject 0.3 mLs (0.3 mg) into the muscle once as needed for anaphylaxis        * EPINEPHrine 0.3 MG/0.3ML injection 2-pack    ADRENACLICK    0.6 mL    Inject 0.3 mLs (0.3 mg) into the muscle as needed for anaphylaxis        fluticasone 50 MCG/ACT spray    FLONASE    3 Bottle    Spray 1-2 sprays into both nostrils daily    Chronic rhinitis       LOTEMAX 0.5 % ophthalmic susp   Generic drug:  loteprednol      Place 1 drop into both eyes daily as needed.        montelukast 10 MG tablet    SINGULAIR    90 tablet    Take 1 tablet (10 mg) by mouth At Bedtime    Chronic rhinitis       omeprazole 40 MG capsule    priLOSEC     Take 40 mg by mouth daily    History of left breast cancer       simvastatin 40 MG tablet    ZOCOR    90 tablet    Take 1 tablet (40 mg) by mouth At Bedtime    Hyperlipidemia LDL goal <130       VENTOLIN  (90 BASE) MCG/ACT Inhaler   Generic drug:  albuterol     1 Inhaler    Inhale 1 puff into the lungs every 4 hours as needed    Wheezing       * Notice:  This list has 2 medication(s) that are the same as other medications prescribed for you. Read the directions carefully, and ask your doctor or other care provider to review them with you.

## 2017-10-16 NOTE — PATIENT INSTRUCTIONS
Dr. Baird would like you to have a Dexa scan. We would like to see you back in 6 months for a follow up appointment with labs prior. When you are in need of a refill, please call your pharmacy and they will send us a request.  Copy of appointments, and after visit summary (AVS) given to patient.  If you have any questions please call Saloni Larry RN, BSN Oncology Hematology  [facility] [phone]. For questions after business hours, or on holidays/weekends, please call our after hours Nurse Triage line [phone]. Thank you.             Due Dexa.   6 months f/u with labs

## 2017-10-26 ENCOUNTER — TELEPHONE (OUTPATIENT)
Dept: FAMILY MEDICINE | Facility: CLINIC | Age: 52
End: 2017-10-26

## 2017-10-26 NOTE — TELEPHONE ENCOUNTER
Panel Management Review      Patient has the following on her problem list: None      Composite cancer screening  Chart review shows that this patient is due/due soon for the following Fecal Colorectal (FIT)  Summary:    Patient is due/failing the following:   FIT    Action needed:   Patient needs referral/order: fit      Type of outreach:    Sent [employer] message.    Questions for provider review:    None                                                                                                                                    Hilary Freeman MA       Chart routed to Care Team .

## 2017-11-21 ENCOUNTER — HOSPITAL ENCOUNTER (OUTPATIENT)
Dept: BONE DENSITY | Facility: CLINIC | Age: 52
Discharge: HOME | End: 2017-11-21
Attending: INTERNAL MEDICINE | Admitting: INTERNAL MEDICINE
Payer: COMMERCIAL

## 2017-11-21 DIAGNOSIS — Z78.0 MENOPAUSE: ICD-10-CM

## 2017-11-21 PROCEDURE — 77080 DXA BONE DENSITY AXIAL: CPT

## 2017-11-28 NOTE — PROGRESS NOTES
Called and reviewed results with the patient per Dr. Baird. Patient had no further questions or concerns at this time and also verbalized understanding. Direct line provided if any further questions/concerns arise.

## 2017-12-26 DIAGNOSIS — Z85.3 PERSONAL HISTORY OF MALIGNANT NEOPLASM OF BREAST: ICD-10-CM

## 2017-12-26 RX ORDER — ANASTROZOLE 1 MG/1
1 TABLET ORAL DAILY
Qty: 90 TABLET | Refills: 3 | Status: SHIPPED | OUTPATIENT
Start: 2017-12-26 | End: 2018-01-12

## 2017-12-26 NOTE — PROGRESS NOTES
Fax received from [facility] requesting a refill of Anastrazole on behalf of pt.  Last refill: 10/17/2016  # 90 with 3 refills at [facility].  Last office visit:  10/16/2017  Next office visit:  4/16/2018    This is an appropriate refill, and has been e-prescribed. Saloni Larry RN, BSN, OCN    Addendum 1/12, this Rx was sent to [facility], called to cancel and change to [facility].

## 2018-01-12 RX ORDER — ANASTROZOLE 1 MG/1
1 TABLET ORAL DAILY
Qty: 90 TABLET | Refills: 3 | Status: SHIPPED | OUTPATIENT
Start: 2018-01-12 | End: 2018-04-16 | Stop reason: ALTCHOICE

## 2018-02-12 ENCOUNTER — TELEPHONE (OUTPATIENT)
Dept: FAMILY MEDICINE | Facility: CLINIC | Age: 53
End: 2018-02-12

## 2018-03-21 ENCOUNTER — TELEPHONE (OUTPATIENT)
Dept: FAMILY MEDICINE | Facility: CLINIC | Age: 53
End: 2018-03-21

## 2018-03-21 DIAGNOSIS — J30.9 CHRONIC ALLERGIC RHINITIS, UNSPECIFIED SEASONALITY, UNSPECIFIED TRIGGER: Primary | ICD-10-CM

## 2018-03-21 DIAGNOSIS — K21.9 GASTROESOPHAGEAL REFLUX DISEASE WITHOUT ESOPHAGITIS: ICD-10-CM

## 2018-03-21 DIAGNOSIS — E78.5 HYPERLIPIDEMIA LDL GOAL <130: ICD-10-CM

## 2018-03-21 RX ORDER — SIMVASTATIN 40 MG
TABLET ORAL
Qty: 90 TABLET | Refills: 0 | Status: SHIPPED | OUTPATIENT
Start: 2018-03-21 | End: 2018-04-30

## 2018-03-21 RX ORDER — MONTELUKAST SODIUM 10 MG/1
TABLET ORAL
Qty: 90 TABLET | Refills: 0 | Status: SHIPPED | OUTPATIENT
Start: 2018-03-21 | End: 2018-04-30

## 2018-03-21 RX ORDER — OMEPRAZOLE 40 MG/1
CAPSULE, DELAYED RELEASE ORAL
Qty: 90 CAPSULE | Refills: 0 | Status: SHIPPED | OUTPATIENT
Start: 2018-03-21 | End: 2018-04-30

## 2018-03-21 NOTE — TELEPHONE ENCOUNTER
"Requested Prescriptions   Pending Prescriptions Disp Refills     omeprazole (PRILOSEC) 40 MG capsule [Pharmacy Med Name: OMEPRAZOLE CAPS 40MG]  Last Written Prescription Date:  Historical  Last Fill Quantity: \,  # refills: \   Last office visit: 03/29//2017 with prescribing provider:  Laura Tran Office Visit:   Next 5 appointments (look out 90 days)     Apr 16, 2018  3:15 PM CDT   Return Visit with Sunita Baird MD   [facility] ([facility])    [facility] [address]  [facility] [address]   [phone]                  90 capsule 3     Sig: TAKE 1 CAPSULE DAILY. TAKE 30 TO 60 MINUTES BEFORE A MEAL    PPI Protocol Passed    3/21/2018 12:03 AM       Passed - Not on Clopidogrel (unless Pantoprazole ordered)       Passed - No diagnosis of osteoporosis on record       Passed - Recent (12 mo) or future (30 days) visit within the authorizing provider's specialty    Patient had office visit in the last 12 months or has a visit in the next 30 days with authorizing provider or within the authorizing provider's specialty.  See \"Patient Info\" tab in inbasket, or \"Choose Columns\" in Meds & Orders section of the refill encounter.           Passed - Patient is age 18 or older       Passed - No active pregnacy on record       Passed - No positive pregnancy test in past 12 months        simvastatin (ZOCOR) 40 MG tablet [Pharmacy Med Name: SIMVASTATIN TABS 40MG]  Last Written Prescription Date:  03/29/2017  Last Fill Quantity: 90,  # refills: 3   Last office visit: 03/29/2017 with prescribing provider:  Laura   Future Office Visit:   Next 5 appointments (look out 90 days)     Apr 16, 2018  3:15 PM CDT   Return Visit with Sunita Baird MD   [facility]    [facility]  [address]  [facility] [place] [address]   [phone]                  90 tablet 3     Sig: TAKE 1 TABLET AT BEDTIME    Statins " "Protocol Passed    3/21/2018 12:03 AM       Passed - LDL on file in past 12 months    Recent Labs   Lab Test  03/29/17   0909   LDL  83            Passed - No abnormal creatine kinase in past 12 months    No lab results found.            Passed - Recent (12 mo) or future (30 days) visit within the authorizing provider's specialty    Patient had office visit in the last 12 months or has a visit in the next 30 days with authorizing provider or within the authorizing provider's specialty.  See \"Patient Info\" tab in inbasket, or \"Choose Columns\" in Meds & Orders section of the refill encounter.           Passed - Patient is age 18 or older       Passed - No active pregnancy on record       Passed - No positive pregnancy test in past 12 months        montelukast (SINGULAIR) 10 MG tablet [Pharmacy Med Name: MONTELUKAST SOD TABS 10MG]  Last Written Prescription Date:  03/29/2017  Last Fill Quantity: 90,  # refills: 3   Last office visit: 03/29/2017 with prescribing provider: Jose   Future Office Visit:   Next 5 appointments (look out 90 days)     Apr 16, 2018  3:15 PM CDT   Return Visit with Sunita Baird MD   [facility] ([facility])    [facility]  [address]  [facility] [address]   [phone]                  90 tablet 3     Sig: TAKE 1 TABLET AT BEDTIME    Leukotriene Inhibitors Protocol Passed    3/21/2018 12:03 AM       Passed - Patient is age 12 or older    If patient is under 16, ok to refill using age based dosing.          Passed - Recent (12 mo) or future (30 days) visit within the authorizing provider's specialty    Patient had office visit in the last 12 months or has a visit in the next 30 days with authorizing provider or within the authorizing provider's specialty.  See \"Patient Info\" tab in inbasket, or \"Choose Columns\" in Meds & Orders section of the refill encounter.              "

## 2018-03-21 NOTE — TELEPHONE ENCOUNTER
Routing refill request to provider for review/approval because:  Medication is reported/historical    Shayla Schultz RN

## 2018-04-02 ENCOUNTER — TELEPHONE (OUTPATIENT)
Dept: FAMILY MEDICINE | Facility: CLINIC | Age: 53
End: 2018-04-02

## 2018-04-02 DIAGNOSIS — E78.5 HYPERLIPIDEMIA LDL GOAL <130: Primary | ICD-10-CM

## 2018-04-02 NOTE — TELEPHONE ENCOUNTER
Reason for Call:  Other Blood Work Ordrs    Detailed comments: Ann is coming in on 4/9/2018 to have blood work done for Dr. Calvin. She would like to do her lad for Dr. Garcia at the same time.  She will be non fasting.  Please advise.    Phone Number Patient can be reached at: Home number on file [phone] (home)    Best Time: any    Can we leave a detailed message on this number? YES    Call taken on 4/2/2018 at 1:41 PM by Elizabeth See

## 2018-04-09 DIAGNOSIS — Z85.3 HISTORY OF LEFT BREAST CANCER: ICD-10-CM

## 2018-04-09 DIAGNOSIS — E78.5 HYPERLIPIDEMIA LDL GOAL <130: ICD-10-CM

## 2018-04-09 PROCEDURE — 36415 COLL VENOUS BLD VENIPUNCTURE: CPT | Performed by: INTERNAL MEDICINE

## 2018-04-09 PROCEDURE — 80053 COMPREHEN METABOLIC PANEL: CPT | Performed by: INTERNAL MEDICINE

## 2018-04-09 PROCEDURE — 86300 IMMUNOASSAY TUMOR CA 15-3: CPT | Performed by: INTERNAL MEDICINE

## 2018-04-09 PROCEDURE — 80061 LIPID PANEL: CPT | Performed by: INTERNAL MEDICINE

## 2018-04-10 LAB
ALBUMIN SERPL-MCNC: 3.5 G/DL (ref 3.4–5)
ALP SERPL-CCNC: 156 U/L (ref 40–150)
ALT SERPL W P-5'-P-CCNC: 40 U/L (ref 0–50)
ANION GAP SERPL CALCULATED.3IONS-SCNC: 6 MMOL/L (ref 3–14)
AST SERPL W P-5'-P-CCNC: 25 U/L (ref 0–45)
BILIRUB SERPL-MCNC: 0.3 MG/DL (ref 0.2–1.3)
BUN SERPL-MCNC: 13 MG/DL (ref 7–30)
CALCIUM SERPL-MCNC: 9.1 MG/DL (ref 8.5–10.1)
CANCER AG27-29 SERPL-ACNC: 15 U/ML (ref 0–39)
CHLORIDE SERPL-SCNC: 105 MMOL/L (ref 94–109)
CHOLEST SERPL-MCNC: 218 MG/DL
CO2 SERPL-SCNC: 27 MMOL/L (ref 20–32)
CREAT SERPL-MCNC: 0.64 MG/DL (ref 0.52–1.04)
GFR SERPL CREATININE-BSD FRML MDRD: >90 ML/MIN/1.7M2
GLUCOSE SERPL-MCNC: 95 MG/DL (ref 70–99)
HDLC SERPL-MCNC: 64 MG/DL
LDLC SERPL CALC-MCNC: 105 MG/DL
NONHDLC SERPL-MCNC: 154 MG/DL
POTASSIUM SERPL-SCNC: 3.7 MMOL/L (ref 3.4–5.3)
PROT SERPL-MCNC: 7.6 G/DL (ref 6.8–8.8)
SODIUM SERPL-SCNC: 138 MMOL/L (ref 133–144)
TRIGL SERPL-MCNC: 245 MG/DL

## 2018-04-16 ENCOUNTER — ONCOLOGY VISIT (OUTPATIENT)
Dept: ONCOLOGY | Facility: CLINIC | Age: 53
End: 2018-04-16
Attending: INTERNAL MEDICINE
Payer: COMMERCIAL

## 2018-04-16 VITALS
SYSTOLIC BLOOD PRESSURE: 158 MMHG | RESPIRATION RATE: 16 BRPM | OXYGEN SATURATION: 95 % | HEIGHT: 66 IN | BODY MASS INDEX: 40.69 KG/M2 | WEIGHT: 253.2 LBS | HEART RATE: 95 BPM | DIASTOLIC BLOOD PRESSURE: 81 MMHG | TEMPERATURE: 98.7 F

## 2018-04-16 DIAGNOSIS — E66.3 OVERWEIGHT: ICD-10-CM

## 2018-04-16 DIAGNOSIS — Z85.3 PERSONAL HISTORY OF MALIGNANT NEOPLASM OF BREAST: ICD-10-CM

## 2018-04-16 DIAGNOSIS — M79.10 MUSCLE PAIN: Primary | ICD-10-CM

## 2018-04-16 PROCEDURE — 99214 OFFICE O/P EST MOD 30 MIN: CPT | Performed by: INTERNAL MEDICINE

## 2018-04-16 PROCEDURE — G0463 HOSPITAL OUTPT CLINIC VISIT: HCPCS

## 2018-04-16 RX ORDER — TIMOLOL MALEATE 5 MG/ML
SOLUTION/ DROPS OPHTHALMIC 2 TIMES DAILY PRN
COMMUNITY
Start: 2018-02-06

## 2018-04-16 RX ORDER — EXEMESTANE 25 MG/1
25 TABLET ORAL DAILY
Qty: 90 TABLET | Refills: 3 | Status: SHIPPED | OUTPATIENT
Start: 2018-04-16 | End: 2019-05-07

## 2018-04-16 ASSESSMENT — PAIN SCALES - GENERAL: PAINLEVEL: SEVERE PAIN (6)

## 2018-04-16 NOTE — PATIENT INSTRUCTIONS
Dr. Baird would like you to hold Arimidex until symptoms resolve and then switch to Aromasin. We would josh you to have a Mammogram in July.    We would like to see you back in 6 months for a follow up appointment with labs prior.     When you are in need of a refill, please call your pharmacy and they will send us a request.      Copy of appointments, and after visit summary (AVS) given to patient.      If you have any questions please call Saloni Larry RN, BSN Oncology Hematology  [facility] [phone]. For questions after business hours, or on holidays/weekends, please call our after hours Nurse Triage line [phone]. Thank you.             Hold arimidex till symptoms resolved then try Aromasin. F/u in 6 months with labs. MA in July

## 2018-04-16 NOTE — LETTER
4/16/2018         RE: Ann Vargas  [address]  [facility] [address]        Dear Colleague,    Thank you for referring your patient, Ann Vargas, to the [facility]. Please see a copy of my visit note below.    Primary PHYSICIAN:  Cydney Garcia MD      CHIEF COMPLAINT AND REASON FOR Visit:   left breast cancer T1bN0 s/p lumpectomy 11/2014, RT 1/2015.      HISTORY OF PRESENT ILLNESS:  Ann Vargas is a 48-year-old peromenopausal woman had her screening mammogram 09/2014,  found to have new microcalcification in the left upper outer quadrant of the breast around 1 o'clock position 8 cm from the nipple.  Stereotactic biopsy on the left upper outer quadrant of the breast microcalcifications sites, one is negative.  The other one turned out to be invasive ductal cancer, grade 1, negative lymphovascular invasion, no DCIS, ER/% positive, HER-2/seth is negative.  She  did not feel breast mass, no skin changes.  She had post-biopsy hematoma.  Left lumpectomy 11/19/2014 found 5 mm IDC, Grade I, 2 sLN negative, clear margin. She has pT1bN0 disease.    She finished RT 1/2015. Tamoxifen was advised after. LMP 1/2015. It is changed to Arimidex 4/2016. She has severe muscle pain from it and d/c in 4/2018.        OTHER MEDICAL PROBLEMS:  Obesity, hyperlipidemia, GERD.   OCP for yrs d/c 5/2014 found to be post menopausal by blood work in 6/2014      MEDICATIONS:  Reviewed in Epic system.      SOCIAL HISTORY:  She lives in [place].  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  Denies smoking or alcohol abuse.      FAMILY HISTORY:  One great-grandmother had breast cancer, she was a survivor.  One grandmother had disease from ovarian cancer.      REVIEW OF SYSTEMS:   She had negative endometrium biopsy, her LMP remains to be 1/2015.   She has hard time losing her weight.   No more chest wall or breast complains.   She reports muscle ache from AI. She broke out into tears. She has to keep  "moving to prevent stiffness, which is worse in her smaller joints.      PHYSICAL EXAMINATION:   VITAL SIGNS: Blood pressure 158/81, pulse 95, temperature 98.7  F (37.1  C), temperature source Tympanic, resp. rate 16, height 1.664 m (5' 5.5\"), weight 114.9 kg (253 lb 3.2 oz), last menstrual period 02/17/2015, SpO2 95 %, not currently breastfeeding.  GENERAL APPEARANCE:  Middle aged woman looks like stated age.  She is a little bit anxious.  She is morbidly obese.  Not in acute distress.   HEENT: The patient is normocephalic, atraumatic. Pupils are equally reactive to light.  Sclerae are anicteric.  Moist oral mucosa.  Negative pharynx.  No oral thrush. Left iritis just saw \"total eye\".  NECK:  Supple.  No jugular venous distention.  Thyroid is not palpable.   LYMPH NODES:  Superficial lymphadenopathy is not appreciable in the bilateral cervical, supraclavicular, axillary or inguinal areas.   CARDIOVASCULAR:  S1, S2 regular with no murmurs or gallops.  No carotid or abdominal bruits.   PULMONARY:  Lungs are clear to auscultation and percussion bilaterally.  There is no wheezing or rhonchi.   GASTROINTESTINAL:  Abdomen is soft, nontender.  No hepatosplenomegaly.  No signs of ascites.  No mass appreciable.   MUSCULOSKELETAL/EXTREMITIES:  No edema.  No cyanotic changes.  No signs of joint deformity.  No lymphedema.   NEUROLOGIC:  Cranial nerves II-XII are grossly intact.  Sensation intact.  Muscle strength and muscle tone symmetrical, 5/5 throughout.   BACK:  No spinal or paraspinal tenderness.  No CVA tenderness.   SKIN:  No petechiae.  No rash.  No signs of cellulitis.   BREASTS:  Bilateral breast exam review.  Bilateral oversized symmetrical breasts.  Right side is clear.  Left side has well healed lumpectomy and sentinel LN scar.      CURRENT DATA REVIEWED  LFT/marker are good.      Current Image Reviewed  Dexa 11/2017 - nl   3D  MA 7/2017: negative      Old data reviewed with summray  Left lumpectomy 11/19/2014 " found 5 mm IDC, Grade I, 2 sLN negative, clear margin. She has pT1bN0 disease.    Biopsy 10/29/2014- tereotactic biopsy on the left upper outer quadrant of the breast microcalcifications sites, one is negative.  The other one turned out to be invasive ductal cancer, grade 1, negative lymphovascular invasion, no DCIS, ER/% positive, HER-2/seth is negative.    Dexa 12/2014: nl gone desity  Bone scan 12/2014: no mets         ASSESSMENT AND PLAN:    1.  left infiltrating ductal cancer, grade 1, ER/% positive, HER 2 negative, negative lymphovascular invasion, no DCIS, diagnosed through screening mammogram.  S/p lumpectomy 11/2014 found 5 mm GI, IDC, 2LN negative, with clear margin. She has pT1bN0 disease.   She finished RT 1/2015.      Tamoxifen was advised after. LMP 1/2015. It was changed to Arimidex 4/2016.   She has severe muscle pain from it and d/c in 4/2018.   Recommend her consider Aromasin after her symptoms resolved.   We discussed the longer anti hormone data. She is open either way.   She is due 6 months f/u with labs.     2. Baseline obesity. She is gaining weight now. Weight loss counseling is provided.   She is informed post menopausal obesity is a risk factor for breast cancer.     3.muscle pain, stiffness from Arimidex - hold it and change it later to other med. She can try NSAID.          Again, thank you for allowing me to participate in the care of your patient.        Sincerely,        Sunita Baird MD, MD

## 2018-04-16 NOTE — MR AVS SNAPSHOT
After Visit Summary   4/16/2018    Ann Vargas    MRN: [id number]           Patient Information     Date Of Birth          1965        Visit Information        Provider Department      4/16/2018 3:15 PM Sunita Baird MD [facility]        Today's Diagnoses     Muscle pain    -  1    Personal history of malignant neoplasm of breast        Overweight          Care Instructions    Dr. Baird would like you to hold Arimidex until symptoms resolve and then switch to Aromasin. We would josh you to have a Mammogram in July.    We would like to see you back in 6 months for a follow up appointment with labs prior.     When you are in need of a refill, please call your pharmacy and they will send us a request.      Copy of appointments, and after visit summary (AVS) given to patient.      If you have any questions please call Saloni Larry RN, BSN Oncology Hematology  [facility] [phone]. For questions after business hours, or on holidays/weekends, please call our after hours Nurse Triage line [phone]. Thank you.             Hold arimidex till symptoms resolved then try Aromasin. F/u in 6 months with labs. MA in July           Follow-ups after your visit        Your next 10 appointments already scheduled     Apr 30, 2018  5:00 PM CDT   Office Visit with Cydney Garcia MD   [facility]    [address] [facility] [address] [phone]           Bring a current list of meds and any records pertaining to this visit. For Physicals, please bring immunization records and any forms needing to be filled out. Please arrive 10 minutes early to complete paperwork.            Jul 12, 2018  7:30 AM CDT   (Arrive by 7:15 AM)   MA SCREENING BILATERAL W/ [id number] with [address] [facility] ([facility])    [address] [address] [phone]            "Three-dimensional (3D) mammograms are available at [facility] locations in [place], [place], [place], [place], [facility], [place], [place], and [place]. -Health locations include [facility] in [place]. Benefits of 3D mammograms include: - Improved rate of cancer detection - Decreases your chance of having to go back for more tests, which means fewer: - \"False-positive\" results (This means that there is an abnormal area but it isn't cancer.) - Invasive testing procedures, such as a biopsy or surgery - Can provide clearer images of the breast if you have dense breast tissue. 3D mammography is an optional exam that anyone can have with a 2D mammogram. It doesn't replace or take the place of a 2D mammogram. 2D mammograms remain an effective screening test for all women.  Not all insurance companies cover the cost of a 3D mammogram. Check with your insurance.            Oct 15, 2018  5:00 PM CDT   LAB with CL LAB   [facility]    [address] [address]   [phone]           Please do not eat 10-12 hours before your appointment if you are coming in fasting for labs on lipids, cholesterol, or glucose (sugar). This does not apply to pregnant women. Water, hot tea and black coffee (with nothing added) are okay. Do not drink other fluids, diet soda or chew gum.            Oct 22, 2018  3:15 PM CDT   Return Visit with Sunita Baird MD   [facility] ([facility])    [facility]  [address]  [facility] [address]   [phone]              Future tests that were ordered for you today     Open Future Orders        Priority Expected Expires Ordered    Ca27.29  breast tumor marker Routine 10/1/2018 10/31/2018 4/16/2018    Comprehensive metabolic panel Routine 10/1/2018 10/31/2018 4/16/2018    MA Screen Bilateral w/Gabe Routine 7/1/2018 4/16/2019 4/16/2018    " "        Who to contact     If you have questions or need follow up information about today's clinic visit or your schedule please contact [facility] directly at [phone].  Normal or non-critical lab and imaging results will be communicated to you by [employer], letter or phone within 4 business days after the clinic has received the results. If you do not hear from us within 7 days, please contact the clinic through [employer] or phone. If you have a critical or abnormal lab result, we will notify you by phone as soon as possible.  Submit refill requests through [employer]) or call your pharmacy and they will forward the refill request to us. Please allow 3 business days for your refill to be completed.          Additional Information About Your Visit        [employer]) Information     [employer]) gives you secure access to your electronic health record. If you see a primary care provider, you can also send messages to your care team and make appointments. If you have questions, please call your primary care clinic.  If you do not have a primary care provider, please call [phone] and they will assist you.        Care EveryWhere ID     This is your Care EveryWhere ID. This could be used by other organizations to access your [place] medical records  [id number]        Your Vitals Were     Pulse Temperature Respirations Height Last Period Pulse Oximetry    95 98.7  F (37.1  C) (Tympanic) 16 1.664 m (5' 5.5\") 02/17/2015 95%    Breastfeeding? BMI (Body Mass Index)                No 41.49 kg/m2           Blood Pressure from Last 3 Encounters:   04/16/18 158/81   10/16/17 (!) 141/93   07/06/17 135/90    Weight from Last 3 Encounters:   04/16/18 114.9 kg (253 lb 3.2 oz)   10/16/17 111.8 kg (246 lb 8 oz)   04/17/17 108.9 kg (240 lb)                 Today's Medication Changes          These changes are accurate as of 4/16/18  3:48 PM.  If you have any questions, ask your nurse or doctor.               Start taking these " medicines.        Dose/Directions    exemestane 25 MG tablet   Commonly known as:  AROMASIN   Used for:  Personal history of malignant neoplasm of breast, Muscle pain   Started by:  Sunita Baird MD        Dose:  25 mg   Take 1 tablet (25 mg) by mouth daily   Quantity:  90 tablet   Refills:  3         Stop taking these medicines if you haven't already. Please contact your care team if you have questions.     anastrozole 1 MG tablet   Commonly known as:  ARIMIDEX   Stopped by:  Sunita Baidr MD                Where to get your medicines      These medications were sent to [facility] - [address]  [address] [place] [address]     Phone:  [phone]     exemestane 25 MG tablet                Primary Care Provider Office Phone # Fax #    Cydney Garcia [phone] [address] [facility] [address]        Equal Access to Services     [facility]: Hadii holly tom hadasho Sovalentino, waaxda luqadaha, qaybta kaalmada adeegyada, blanca george . So [facility] [place] [phone].    ATENCIÓN: Si habla español, tiene a priest disposición servicios gratuitos de asistencia lingüística. Llame al [phone].    We comply with applicable federal civil rights laws and [place] laws. We do not discriminate on the basis of race, color, national origin, age, disability, sex, sexual orientation, or gender identity.            Thank you!     Thank you for choosing [facility]  for your care. Our goal is always to provide you with excellent care. Hearing back from our patients is one way we can continue to improve our services. Please take a few minutes to complete the written survey that you may receive in the mail after your visit with us. Thank you!             Your Updated Medication List - Protect others around you: Learn how to safely use, store and throw away your medicines at www.disposemymeds.org.          This list is  accurate as of 4/16/18  3:48 PM.  Always use your most recent med list.                   Brand Name Dispense Instructions for use Diagnosis    ASPIRIN PO      Take 650 mg by mouth as needed for moderate pain        BENADRYL PO      Take 25 mg by mouth every 4 hours as needed for itching (Hives)    Breast cancer, left (H)       EPINEPHrine 0.3 MG/0.3ML injection 2-pack    ADRENACLICK    0.6 mL    Inject 0.3 mLs (0.3 mg) into the muscle as needed for anaphylaxis        exemestane 25 MG tablet    AROMASIN    90 tablet    Take 1 tablet (25 mg) by mouth daily    Personal history of malignant neoplasm of breast, Muscle pain       LOTEMAX 0.5 % ophthalmic susp   Generic drug:  loteprednol      Place 1 drop into both eyes daily as needed.        montelukast 10 MG tablet    SINGULAIR    90 tablet    TAKE 1 TABLET AT BEDTIME    Chronic allergic rhinitis, unspecified seasonality, unspecified trigger       omeprazole 40 MG capsule    priLOSEC    90 capsule    TAKE 1 CAPSULE DAILY. TAKE 30 TO 60 MINUTES BEFORE A MEAL    Gastroesophageal reflux disease without esophagitis       simvastatin 40 MG tablet    ZOCOR    90 tablet    TAKE 1 TABLET AT BEDTIME    Hyperlipidemia LDL goal <130       timolol 0.5 % ophthalmic solution    TIMOPTIC      Muscle pain, Personal history of malignant neoplasm of breast

## 2018-04-16 NOTE — NURSING NOTE
"Oncology Rooming Note    April 16, 2018 3:14 PM   Ann Vargas is a 52 year old female who presents for:    Chief Complaint   Patient presents with     Oncology Clinic Visit     6 month recheck History of left breast cancer, review labs     Initial Vitals: /81 (BP Location: Right arm, Patient Position: Sitting, Cuff Size: Adult Large)  Pulse 95  Temp 98.7  F (37.1  C) (Tympanic)  Resp 16  Ht 1.664 m (5' 5.5\")  Wt 114.9 kg (253 lb 3.2 oz)  LMP 02/17/2015  SpO2 95%  Breastfeeding? No  BMI 41.49 kg/m2 Estimated body mass index is 41.49 kg/(m^2) as calculated from the following:    Height as of this encounter: 1.664 m (5' 5.5\").    Weight as of this encounter: 114.9 kg (253 lb 3.2 oz). Body surface area is 2.3 meters squared.  Severe Pain (6) Comment: general body aches   Patient's last menstrual period was 02/17/2015.  Allergies reviewed: Yes  Medications reviewed: Yes    Medications: Medication refills not needed today.  Pharmacy name entered into EPIC:      [facility] - [place], [place] - [address] [place] [address] PHARMACY [place] - [place], [place] - [address] [place] [address]    Clinical concerns:6 month recheck History of left breast cancer, review labs.     Patient has questions about Anastrozole side effects and her general pain.   Liver function  Lab results are they due to Anastrozole.       7  minutes for nursing intake (face to face time)     Annika Oropeza CMA              "

## 2018-04-16 NOTE — PROGRESS NOTES
"Primary PHYSICIAN:  Cydney Garcia MD      CHIEF COMPLAINT AND REASON FOR Visit:   left breast cancer T1bN0 s/p lumpectomy 11/2014, RT 1/2015.      HISTORY OF PRESENT ILLNESS:  Ann Vargas is a 48-year-old peromenopausal woman had her screening mammogram 09/2014,  found to have new microcalcification in the left upper outer quadrant of the breast around 1 o'clock position 8 cm from the nipple.  Stereotactic biopsy on the left upper outer quadrant of the breast microcalcifications sites, one is negative.  The other one turned out to be invasive ductal cancer, grade 1, negative lymphovascular invasion, no DCIS, ER/% positive, HER-2/seth is negative.  She  did not feel breast mass, no skin changes.  She had post-biopsy hematoma.  Left lumpectomy 11/19/2014 found 5 mm IDC, Grade I, 2 sLN negative, clear margin. She has pT1bN0 disease.    She finished RT 1/2015. Tamoxifen was advised after. LMP 1/2015. It is changed to Arimidex 4/2016. She has severe muscle pain from it and d/c in 4/2018.        OTHER MEDICAL PROBLEMS:  Obesity, hyperlipidemia, GERD.   OCP for yrs d/c 5/2014 found to be post menopausal by blood work in 6/2014      MEDICATIONS:  Reviewed in Epic system.      SOCIAL HISTORY:  She lives in [place].  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  Denies smoking or alcohol abuse.      FAMILY HISTORY:  One great-grandmother had breast cancer, she was a survivor.  One grandmother had disease from ovarian cancer.      REVIEW OF SYSTEMS:   She had negative endometrium biopsy, her LMP remains to be 1/2015.   She has hard time losing her weight.   No more chest wall or breast complains.   She reports muscle ache from AI. She broke out into tears. She has to keep moving to prevent stiffness, which is worse in her smaller joints.      PHYSICAL EXAMINATION:   VITAL SIGNS: Blood pressure 158/81, pulse 95, temperature 98.7  F (37.1  C), temperature source Tympanic, resp. rate 16, height 1.664 m (5' 5.5\"), " "weight 114.9 kg (253 lb 3.2 oz), last menstrual period 02/17/2015, SpO2 95 %, not currently breastfeeding.  GENERAL APPEARANCE:  Middle aged woman looks like stated age.  She is a little bit anxious.  She is morbidly obese.  Not in acute distress.   HEENT: The patient is normocephalic, atraumatic. Pupils are equally reactive to light.  Sclerae are anicteric.  Moist oral mucosa.  Negative pharynx.  No oral thrush. Left iritis just saw \"total eye\".  NECK:  Supple.  No jugular venous distention.  Thyroid is not palpable.   LYMPH NODES:  Superficial lymphadenopathy is not appreciable in the bilateral cervical, supraclavicular, axillary or inguinal areas.   CARDIOVASCULAR:  S1, S2 regular with no murmurs or gallops.  No carotid or abdominal bruits.   PULMONARY:  Lungs are clear to auscultation and percussion bilaterally.  There is no wheezing or rhonchi.   GASTROINTESTINAL:  Abdomen is soft, nontender.  No hepatosplenomegaly.  No signs of ascites.  No mass appreciable.   MUSCULOSKELETAL/EXTREMITIES:  No edema.  No cyanotic changes.  No signs of joint deformity.  No lymphedema.   NEUROLOGIC:  Cranial nerves II-XII are grossly intact.  Sensation intact.  Muscle strength and muscle tone symmetrical, 5/5 throughout.   BACK:  No spinal or paraspinal tenderness.  No CVA tenderness.   SKIN:  No petechiae.  No rash.  No signs of cellulitis.   BREASTS:  Bilateral breast exam review.  Bilateral oversized symmetrical breasts.  Right side is clear.  Left side has well healed lumpectomy and sentinel LN scar.      CURRENT DATA REVIEWED  LFT/marker are good.      Current Image Reviewed  Dexa 11/2017 - nl   3D  MA 7/2017: negative      Old data reviewed with [facility]  Left lumpectomy 11/19/2014 found 5 mm IDC, Grade I, 2 sLN negative, clear margin. She has pT1bN0 disease.    Biopsy 10/29/2014- tereotactic biopsy on the left upper outer quadrant of the breast microcalcifications sites, one is negative.  The other one turned out to be " invasive ductal cancer, grade 1, negative lymphovascular invasion, no DCIS, ER/% positive, HER-2/seth is negative.    Dexa 12/2014: nl gone desity  Bone scan 12/2014: no mets         ASSESSMENT AND PLAN:    1.  left infiltrating ductal cancer, grade 1, ER/% positive, HER 2 negative, negative lymphovascular invasion, no DCIS, diagnosed through screening mammogram.  S/p lumpectomy 11/2014 found 5 mm GI, IDC, 2LN negative, with clear margin. She has pT1bN0 disease.   She finished RT 1/2015.      Tamoxifen was advised after. LMP 1/2015. It was changed to Arimidex 4/2016.   She has severe muscle pain from it and d/c in 4/2018.   Recommend her consider Aromasin after her symptoms resolved.   We discussed the longer anti hormone data. She is open either way.   She is due 6 months f/u with labs.     2. Baseline obesity. She is gaining weight now. Weight loss counseling is provided.   She is informed post menopausal obesity is a risk factor for breast cancer.     3.muscle pain, stiffness from Arimidex - hold it and change it later to other med. She can try NSAID.

## 2018-04-30 ENCOUNTER — OFFICE VISIT (OUTPATIENT)
Dept: FAMILY MEDICINE | Facility: CLINIC | Age: 53
End: 2018-04-30
Payer: COMMERCIAL

## 2018-04-30 VITALS
TEMPERATURE: 97.4 F | DIASTOLIC BLOOD PRESSURE: 88 MMHG | SYSTOLIC BLOOD PRESSURE: 138 MMHG | HEIGHT: 66 IN | BODY MASS INDEX: 40.34 KG/M2 | RESPIRATION RATE: 18 BRPM | WEIGHT: 251 LBS | HEART RATE: 83 BPM

## 2018-04-30 DIAGNOSIS — J30.9 CHRONIC ALLERGIC RHINITIS, UNSPECIFIED SEASONALITY, UNSPECIFIED TRIGGER: ICD-10-CM

## 2018-04-30 DIAGNOSIS — K21.9 GASTROESOPHAGEAL REFLUX DISEASE WITHOUT ESOPHAGITIS: ICD-10-CM

## 2018-04-30 DIAGNOSIS — Z00.00 ROUTINE GENERAL MEDICAL EXAMINATION AT A HEALTH CARE FACILITY: Primary | ICD-10-CM

## 2018-04-30 DIAGNOSIS — E78.5 HYPERLIPIDEMIA LDL GOAL <130: ICD-10-CM

## 2018-04-30 DIAGNOSIS — Z12.11 SPECIAL SCREENING FOR MALIGNANT NEOPLASMS, COLON: ICD-10-CM

## 2018-04-30 PROCEDURE — 87624 HPV HI-RISK TYP POOLED RSLT: CPT | Performed by: FAMILY MEDICINE

## 2018-04-30 PROCEDURE — G0145 SCR C/V CYTO,THINLAYER,RESCR: HCPCS | Performed by: FAMILY MEDICINE

## 2018-04-30 PROCEDURE — 99396 PREV VISIT EST AGE 40-64: CPT | Performed by: FAMILY MEDICINE

## 2018-04-30 RX ORDER — MONTELUKAST SODIUM 10 MG/1
1 TABLET ORAL AT BEDTIME
Qty: 90 TABLET | Refills: 3 | Status: SHIPPED | OUTPATIENT
Start: 2018-04-30 | End: 2019-07-08

## 2018-04-30 RX ORDER — SIMVASTATIN 40 MG
40 TABLET ORAL AT BEDTIME
Qty: 90 TABLET | Refills: 3 | Status: SHIPPED | OUTPATIENT
Start: 2018-04-30 | End: 2019-07-08

## 2018-04-30 RX ORDER — OMEPRAZOLE 40 MG/1
40 CAPSULE, DELAYED RELEASE ORAL DAILY
Qty: 90 CAPSULE | Refills: 3 | Status: SHIPPED | OUTPATIENT
Start: 2018-04-30 | End: 2019-07-08

## 2018-04-30 ASSESSMENT — PAIN SCALES - GENERAL: PAINLEVEL: NO PAIN (0)

## 2018-04-30 NOTE — MR AVS SNAPSHOT
After Visit Summary   4/30/2018    Ann Vargas    MRN: [id number]           Patient Information     Date Of Birth          1965        Visit Information        Provider Department      4/30/2018 5:00 PM Cydney Garcia MD [facility]        Today's Diagnoses     Routine general medical examination at a health care facility    -  1    Special screening for malignant neoplasms, colon        Hyperlipidemia LDL goal <130        Gastroesophageal reflux disease without esophagitis        Chronic allergic rhinitis, unspecified seasonality, unspecified trigger          Care Instructions          Thank you for choosing [facility].  You may be receiving a survey in the mail from [employer] regarding your visit today.  Please take a few minutes to complete and return the survey to let us know how we are doing.      Our Clinic hours are:  Mondays    7:20 am - 7 pm  Tues -  Fri  7:20 am - 5 pm    Clinic Phone: [phone]    The clinic lab opens at 7:30 am Mon - Fri and appointments are required.    [facility]  Ph. [phone]  Monday-Thursday 8 am - 7pm  Tues/Wed/Fri 8 am - 5:30 pm           Preventive Health Recommendations  Female Ages 50 - 64    Yearly exam: See your health care provider every year in order to  o Review health changes.   o Discuss preventive care.    o Review your medicines if your doctor has prescribed any.      Get a Pap test every three years (unless you have an abnormal result and your provider advises testing more often).    If you get Pap tests with HPV test, you only need to test every 5 years, unless you have an abnormal result.     You do not need a Pap test if your uterus was removed (hysterectomy) and you have not had cancer.    You should be tested each year for STDs (sexually transmitted diseases) if you're at risk.     Have a mammogram every 1 to 2 years.    Have a colonoscopy at age 50, or have a yearly FIT test (stool  "test). These exams screen for colon cancer.      Have a cholesterol test every 5 years, or more often if advised.    Have a diabetes test (fasting glucose) every three years. If you are at risk for diabetes, you should have this test more often.     If you are at risk for osteoporosis (brittle bone disease), think about having a bone density scan (DEXA).    Shots: Get a flu shot each year. Get a tetanus shot every 10 years.    Nutrition:     Eat at least 5 servings of fruits and vegetables each day.    Eat whole-grain bread, whole-wheat pasta and brown rice instead of white grains and rice.    Talk to your provider about Calcium and Vitamin D.     Lifestyle    Exercise at least 150 minutes a week (30 minutes a day, 5 days a week). This will help you control your weight and prevent disease.    Limit alcohol to one drink per day.    No smoking.     Wear sunscreen to prevent skin cancer.     See your dentist every six months for an exam and cleaning.    See your eye doctor every 1 to 2 years.            Follow-ups after your visit        Your next 10 appointments already scheduled     Jul 12, 2018  7:30 AM CDT   (Arrive by 7:15 AM)   MA SCREENING BILATERAL W/ [id number] with [id number]   [facility] ([facility])    [address] [address] [phone]           Three-dimensional (3D) mammograms are available at [facility] locations in [facility], [place], [place], [facility], [place], [place], and [place]. Health locations include [facility] in [place]. Benefits of 3D mammograms include: - Improved rate of cancer detection - Decreases your chance of having to go back for more tests, which means fewer: - \"False-positive\" results (This means that there is an abnormal area but it isn't cancer.) - Invasive testing procedures, such as a biopsy or surgery - Can provide clearer images of the breast if you have dense breast tissue. 3D " mammography is an optional exam that anyone can have with a 2D mammogram. It doesn't replace or take the place of a 2D mammogram. 2D mammograms remain an effective screening test for all women.  Not all insurance companies cover the cost of a 3D mammogram. Check with your insurance.            Oct 15, 2018  5:00 PM CDT   LAB with CL LAB   [facility] ([facility])    [address]  [facility] [address]   [phone]           Please do not eat 10-12 hours before your appointment if you are coming in fasting for labs on lipids, cholesterol, or glucose (sugar). This does not apply to pregnant women. Water, hot tea and black coffee (with nothing added) are okay. Do not drink other fluids, diet soda or chew gum.            Oct 22, 2018  3:15 PM CDT   Return Visit with Sunita Baird MD   [facility] ([facility])    [facility]  [address]  [facility] [address]   [phone]              Future tests that were ordered for you today     Open Future Orders        Priority Expected Expires Ordered    Fecal colorectal cancer screen (FIT) Routine 5/21/2018 7/23/2018 4/30/2018            Who to contact     If you have questions or need follow up information about today's clinic visit or your schedule please contact [facility] directly at [phone].  Normal or non-critical lab and imaging results will be communicated to you by MyChart, letter or phone within 4 business days after the clinic has received the results. If you do not hear from us within 7 days, please contact the clinic through MyChart or phone. If you have a critical or abnormal lab result, we will notify you by phone as soon as possible.  Submit refill requests through [employer] or call your pharmacy and they will forward the refill request to us. Please allow 3 business days for your refill to be completed.          Additional Information  "About Your Visit        [employer] Information     [employer] gives you secure access to your electronic health record. If you see a primary care provider, you can also send messages to your care team and make appointments. If you have questions, please call your primary care clinic.  If you do not have a primary care provider, please call [phone] and they will assist you.        Care EveryWhere ID     This is your Care EveryWhere ID. This could be used by other organizations to access your [facility] medical records  [id number]        Your Vitals Were     Pulse Temperature Respirations Height Last Period Breastfeeding?    83 97.4  F (36.3  C) (Tympanic) 18 5' 5.5\" (1.664 m) 02/17/2015 No    BMI (Body Mass Index)                   41.13 kg/m2            Blood Pressure from Last 3 Encounters:   04/30/18 138/88   04/16/18 158/81   10/16/17 (!) 141/93    Weight from Last 3 Encounters:   04/30/18 251 lb (113.9 kg)   04/16/18 253 lb 3.2 oz (114.9 kg)   10/16/17 246 lb 8 oz (111.8 kg)                 Today's Medication Changes          These changes are accurate as of 4/30/18  5:22 PM.  If you have any questions, ask your nurse or doctor.               These medicines have changed or have updated prescriptions.        Dose/Directions    montelukast 10 MG tablet   Commonly known as:  SINGULAIR   This may have changed:  See the new instructions.   Used for:  Chronic allergic rhinitis, unspecified seasonality, unspecified trigger   Changed by:  Cydney Garcia MD        Dose:  1 tablet   Take 1 tablet (10 mg) by mouth At Bedtime   Quantity:  90 tablet   Refills:  3       omeprazole 40 MG capsule   Commonly known as:  priLOSEC   This may have changed:  See the new instructions.   Used for:  Gastroesophageal reflux disease without esophagitis   Changed by:  Cydney Garcia MD        Dose:  40 mg   Take 1 capsule (40 mg) by mouth daily   Quantity:  90 capsule   Refills:  3       simvastatin 40 MG tablet   Commonly known as:  " ZOCOR   This may have changed:  See the new instructions.   Used for:  Hyperlipidemia LDL goal <130   Changed by:  Cydney Garcia MD        Dose:  40 mg   Take 1 tablet (40 mg) by mouth At Bedtime   Quantity:  90 tablet   Refills:  3            Where to get your medicines      These medications were sent to [facility] - [address]  [address] [address]     Phone:  [phone]     montelukast 10 MG tablet    omeprazole 40 MG capsule    simvastatin 40 MG tablet                Primary Care Provider Office Phone # Fax #    Cydney Garcia [phone] [address] [address]        Equal Access to Services     [facility]: Hadii holly tom hadasho Soharmonyali, waaxda luqadaha, qaybta kaalmada adeegyada, blanca george . So [facility] [phone].    ATENCIÓN: Si habla español, tiene a priest disposición servicios gratuitos de asistencia lingüística. [facility] [phone].    We comply with applicable federal civil rights laws and [place] laws. We do not discriminate on the basis of race, color, national origin, age, disability, sex, sexual orientation, or gender identity.            Thank you!     Thank you for choosing [facility]  for your care. Our goal is always to provide you with excellent care. Hearing back from our patients is one way we can continue to improve our services. Please take a few minutes to complete the written survey that you may receive in the mail after your visit with us. Thank you!             Your Updated Medication List - Protect others around you: Learn how to safely use, store and throw away your medicines at www.disposemymeds.org.          This list is accurate as of 4/30/18  5:22 PM.  Always use your most recent med list.                   Brand Name Dispense Instructions for use Diagnosis    ASPIRIN PO      Take 650 mg by mouth as needed for  moderate pain        BENADRYL PO      Take 25 mg by mouth every 4 hours as needed for itching (Hives)    Breast cancer, left (H)       EPINEPHrine 0.3 MG/0.3ML injection 2-pack    ADRENACLICK    0.6 mL    Inject 0.3 mLs (0.3 mg) into the muscle as needed for anaphylaxis        exemestane 25 MG tablet    AROMASIN    90 tablet    Take 1 tablet (25 mg) by mouth daily    Personal history of malignant neoplasm of breast, Muscle pain       LOTEMAX 0.5 % ophthalmic susp   Generic drug:  loteprednol      Place 1 drop into both eyes daily as needed.        montelukast 10 MG tablet    SINGULAIR    90 tablet    Take 1 tablet (10 mg) by mouth At Bedtime    Chronic allergic rhinitis, unspecified seasonality, unspecified trigger       omeprazole 40 MG capsule    priLOSEC    90 capsule    Take 1 capsule (40 mg) by mouth daily    Gastroesophageal reflux disease without esophagitis       simvastatin 40 MG tablet    ZOCOR    90 tablet    Take 1 tablet (40 mg) by mouth At Bedtime    Hyperlipidemia LDL goal <130       timolol 0.5 % ophthalmic solution    TIMOPTIC      Muscle pain, Personal history of malignant neoplasm of breast

## 2018-04-30 NOTE — PROGRESS NOTES
SUBJECTIVE:   CC: Ann Vargas is an 52 year old woman who presents for preventive health visit.     Chief Complaint   Patient presents with     Physical     Health Maintenance     agreed to fit       Healthy Habits:    Do you get at least three servings of calcium containing foods daily (dairy, green leafy vegetables, etc.)? yes    Amount of exercise or daily activities, outside of work: none    Problems taking medications regularly No    Medication side effects: Yes muscle aches Dr. CARDENAS changed hormone medication.     Have you had an eye exam in the past two years? yes    Do you see a dentist twice per year? yes    Do you have sleep apnea, excessive snoring or daytime drowsiness?no    Had been on Anastrozole recently and was having bad body aches.  Dr. Baird changed her to Aromasin.  She's been off the Anastrozole for a few weeks and is feeling better.      Hyperlipidemia Follow-Up      Rate your low fat/cholesterol diet?: fair    Taking statin?  Yes, no muscle aches from statin    Other lipid medications/supplements?:  none      Today's PHQ-2 Score:   PHQ-2 ( 1999 Pfizer) 4/30/2018 4/17/2017   Q1: Little interest or pleasure in doing things 0 0   Q2: Feeling down, depressed or hopeless 0 0   PHQ-2 Score 0 0   Q1: Little interest or pleasure in doing things - -   Q2: Feeling down, depressed or hopeless - -   PHQ-2 Score - -       Abuse: Current or Past(Physical, Sexual or Emotional)- No  Do you feel safe in your environment - Yes    Social History   Substance Use Topics     Smoking status: Never Smoker     Smokeless tobacco: Never Used     Alcohol use 0.0 oz/week     0 Standard drinks or equivalent per week      Comment: -monthly     If you drink alcohol do you typically have >3 drinks per day or >7 drinks per week? No                     Reviewed orders with patient.  Reviewed health maintenance and updated orders accordingly - No  Labs reviewed in EPIC  BP Readings from Last 3 Encounters:   04/30/18 138/88  "  04/16/18 158/81   10/16/17 (!) 141/93    Wt Readings from Last 3 Encounters:   04/30/18 251 lb (113.9 kg)   04/16/18 253 lb 3.2 oz (114.9 kg)   10/16/17 246 lb 8 oz (111.8 kg)                    Alternate mammogram schedule due to breast cancer history      Pertinent mammograms are reviewed under the imaging tab.  History of abnormal Pap smear: NO - age 30- 65 PAP every 3 years recommended    Reviewed and updated as needed this visit by clinical staff  Tobacco  Med Hx  Surg Hx  Fam Hx  Soc Hx        Reviewed and updated as needed this visit by Provider            ROS:  CONSTITUTIONAL: NEGATIVE for fever, chills, change in weight  INTEGUMENTARY/SKIN: NEGATIVE for worrisome rashes, moles or lesions  EYES: NEGATIVE for vision changes or irritation  ENT: NEGATIVE for ear, mouth and throat problems  RESP: NEGATIVE for significant cough or SOB  BREAST: NEGATIVE for masses, tenderness or discharge  CV: NEGATIVE for chest pain, palpitations or peripheral edema  GI: NEGATIVE for nausea, abdominal pain, heartburn, or change in bowel habits  : NEGATIVE for unusual urinary or vaginal symptoms. No vaginal bleeding.  MUSCULOSKELETAL: NEGATIVE for significant arthralgias or myalgia  NEURO: NEGATIVE for weakness, dizziness or paresthesias  PSYCHIATRIC: NEGATIVE for changes in mood or affect     OBJECTIVE:   /88  Pulse 83  Temp 97.4  F (36.3  C) (Tympanic)  Resp 18  Ht 5' 5.5\" (1.664 m)  Wt 251 lb (113.9 kg)  LMP 02/17/2015  Breastfeeding? No  BMI 41.13 kg/m2  EXAM:  GENERAL: healthy, alert and no distress  EYES: Eyes grossly normal to inspection, PERRL and conjunctivae and sclerae normal  HENT: ear canals and TM's normal, nose and mouth without ulcers or lesions  NECK: no adenopathy, no asymmetry, masses, or scars and thyroid normal to palpation  RESP: lungs clear to auscultation - no rales, rhonchi or wheezes  BREAST: normal without masses, tenderness or nipple discharge and no palpable axillary masses or " adenopathy  CV: regular rate and rhythm, normal S1 S2, no S3 or S4, no murmur, click or rub, no peripheral edema and peripheral pulses strong  ABDOMEN: soft, nontender, no hepatosplenomegaly, no masses and bowel sounds normal   (female): normal female external genitalia, normal urethral meatus , vaginal mucosal atrophy and normal cervix, adnexae, and uterus without masses.  MS: no gross musculoskeletal defects noted, no edema  SKIN: no suspicious lesions or rashes  NEURO: Normal strength and tone, mentation intact and speech normal  PSYCH: mentation appears normal, affect normal/bright    ASSESSMENT/PLAN:   1. Routine general medical examination at a health care facility     - Pap imaged thin layer screen with HPV - recommended age 30 - 65 years (select HPV order below)  - HPV High Risk Types DNA Cervical    2. Special screening for malignant neoplasms, colon     - Fecal colorectal cancer screen (FIT); Future    3. Hyperlipidemia LDL goal <130     - simvastatin (ZOCOR) 40 MG tablet; Take 1 tablet (40 mg) by mouth At Bedtime  Dispense: 90 tablet; Refill: 3    4. Gastroesophageal reflux disease without esophagitis     - omeprazole (PRILOSEC) 40 MG capsule; Take 1 capsule (40 mg) by mouth daily  Dispense: 90 capsule; Refill: 3    5. Chronic allergic rhinitis, unspecified seasonality, unspecified trigger     - montelukast (SINGULAIR) 10 MG tablet; Take 1 tablet (10 mg) by mouth At Bedtime  Dispense: 90 tablet; Refill: 3    6.  Vaginal dryness/atrophy.  No estrogen with her breast cancer history.  Recommend organic coconut oil as a topical preparation to help with the dryness    COUNSELING:   Reviewed preventive health counseling, as reflected in patient instructions       Regular exercise       Healthy diet/nutrition    BP Screening:   Last 3 BP Readings:    BP Readings from Last 3 Encounters:   04/30/18 138/88   04/16/18 158/81   10/16/17 (!) 141/93       The following was recommended to the patient:  Re-screen BP  "within a year and recommended lifestyle modifications     reports that she has never smoked. She has never used smokeless tobacco.    Estimated body mass index is 41.13 kg/(m^2) as calculated from the following:    Height as of this encounter: 5' 5.5\" (1.664 m).    Weight as of this encounter: 251 lb (113.9 kg).   Weight management plan: Discussed healthy diet and exercise guidelines and patient will follow up in 12 months in clinic to re-evaluate.    Counseling Resources:  ATP IV Guidelines  Pooled Cohorts Equation Calculator  Breast Cancer Risk Calculator  FRAX Risk Assessment  ICSI Preventive Guidelines  Dietary Guidelines for Americans, 2010  USDA's MyPlate  ASA Prophylaxis  Lung CA Screening    Cydney Garcia MD  [facility]  "

## 2018-04-30 NOTE — PATIENT INSTRUCTIONS
Thank you for choosing [facility].  You may be receiving a survey in the mail from Loi Guillory regarding your visit today.  Please take a few minutes to complete and return the survey to let us know how we are doing.      Our Clinic hours are:  Mondays    7:20 am - 7 pm  Tues -  Fri  7:20 am - 5 pm    Clinic Phone: [phone]    The clinic lab opens at 7:30 am Mon - Fri and appointments are required.    [facility]. [phone]  Monday-Thursday 8 am - 7pm  Tues/Wed/Fri 8 am - 5:30 pm           Preventive Health Recommendations  Female Ages 50 - 64    Yearly exam: See your health care provider every year in order to  o Review health changes.   o Discuss preventive care.    o Review your medicines if your doctor has prescribed any.      Get a Pap test every three years (unless you have an abnormal result and your provider advises testing more often).    If you get Pap tests with HPV test, you only need to test every 5 years, unless you have an abnormal result.     You do not need a Pap test if your uterus was removed (hysterectomy) and you have not had cancer.    You should be tested each year for STDs (sexually transmitted diseases) if you're at risk.     Have a mammogram every 1 to 2 years.    Have a colonoscopy at age 50, or have a yearly FIT test (stool test). These exams screen for colon cancer.      Have a cholesterol test every 5 years, or more often if advised.    Have a diabetes test (fasting glucose) every three years. If you are at risk for diabetes, you should have this test more often.     If you are at risk for osteoporosis (brittle bone disease), think about having a bone density scan (DEXA).    Shots: Get a flu shot each year. Get a tetanus shot every 10 years.    Nutrition:     Eat at least 5 servings of fruits and vegetables each day.    Eat whole-grain bread, whole-wheat pasta and brown rice instead of white grains and rice.    Talk to your provider about  Calcium and Vitamin D.     Lifestyle    Exercise at least 150 minutes a week (30 minutes a day, 5 days a week). This will help you control your weight and prevent disease.    Limit alcohol to one drink per day.    No smoking.     Wear sunscreen to prevent skin cancer.     See your dentist every six months for an exam and cleaning.    See your eye doctor every 1 to 2 years.

## 2018-04-30 NOTE — NURSING NOTE
"Chief Complaint   Patient presents with     Physical     Health Maintenance     agreed to fit       Initial /88  Pulse 83  Temp 97.4  F (36.3  C) (Tympanic)  Resp 18  Ht 5' 5.5\" (1.664 m)  Wt 251 lb (113.9 kg)  LMP 02/17/2015  Breastfeeding? No  BMI 41.13 kg/m2 Estimated body mass index is 41.13 kg/(m^2) as calculated from the following:    Height as of this encounter: 5' 5.5\" (1.664 m).    Weight as of this encounter: 251 lb (113.9 kg).  Medication Reconciliation: complete    "

## 2018-05-02 LAB
COPATH REPORT: NORMAL
PAP: NORMAL

## 2018-05-04 LAB
FINAL DIAGNOSIS: NORMAL
HPV HR 12 DNA CVX QL NAA+PROBE: NEGATIVE
HPV16 DNA SPEC QL NAA+PROBE: NEGATIVE
HPV18 DNA SPEC QL NAA+PROBE: NEGATIVE
SPECIMEN DESCRIPTION: NORMAL
SPECIMEN SOURCE CVX/VAG CYTO: NORMAL

## 2018-07-24 ENCOUNTER — HOSPITAL ENCOUNTER (OUTPATIENT)
Dept: MAMMOGRAPHY | Facility: CLINIC | Age: 53
Discharge: HOME | End: 2018-07-24
Attending: INTERNAL MEDICINE | Admitting: INTERNAL MEDICINE
Payer: COMMERCIAL

## 2018-07-24 DIAGNOSIS — Z85.3 PERSONAL HISTORY OF MALIGNANT NEOPLASM OF BREAST: ICD-10-CM

## 2018-07-24 DIAGNOSIS — M79.10 MUSCLE PAIN: ICD-10-CM

## 2018-07-24 PROCEDURE — 77067 SCR MAMMO BI INCL CAD: CPT

## 2018-10-15 DIAGNOSIS — Z85.3 PERSONAL HISTORY OF MALIGNANT NEOPLASM OF BREAST: ICD-10-CM

## 2018-10-15 DIAGNOSIS — M79.10 MUSCLE PAIN: ICD-10-CM

## 2018-10-15 LAB
ALBUMIN SERPL-MCNC: 3.6 G/DL (ref 3.4–5)
ALP SERPL-CCNC: 138 U/L (ref 40–150)
ALT SERPL W P-5'-P-CCNC: 38 U/L (ref 0–50)
ANION GAP SERPL CALCULATED.3IONS-SCNC: 3 MMOL/L (ref 3–14)
AST SERPL W P-5'-P-CCNC: 31 U/L (ref 0–45)
BILIRUB SERPL-MCNC: 0.3 MG/DL (ref 0.2–1.3)
BUN SERPL-MCNC: 11 MG/DL (ref 7–30)
CALCIUM SERPL-MCNC: 9.7 MG/DL (ref 8.5–10.1)
CHLORIDE SERPL-SCNC: 104 MMOL/L (ref 94–109)
CO2 SERPL-SCNC: 30 MMOL/L (ref 20–32)
CREAT SERPL-MCNC: 0.71 MG/DL (ref 0.52–1.04)
GFR SERPL CREATININE-BSD FRML MDRD: 86 ML/MIN/1.7M2
GLUCOSE SERPL-MCNC: 78 MG/DL (ref 70–99)
POTASSIUM SERPL-SCNC: 3.9 MMOL/L (ref 3.4–5.3)
PROT SERPL-MCNC: 8.1 G/DL (ref 6.8–8.8)
SODIUM SERPL-SCNC: 137 MMOL/L (ref 133–144)

## 2018-10-15 PROCEDURE — 36415 COLL VENOUS BLD VENIPUNCTURE: CPT | Performed by: INTERNAL MEDICINE

## 2018-10-15 PROCEDURE — 80053 COMPREHEN METABOLIC PANEL: CPT | Performed by: INTERNAL MEDICINE

## 2018-10-15 PROCEDURE — 86300 IMMUNOASSAY TUMOR CA 15-3: CPT | Performed by: INTERNAL MEDICINE

## 2018-10-16 LAB — CANCER AG27-29 SERPL-ACNC: 13 U/ML (ref 0–39)

## 2018-10-22 ENCOUNTER — ONCOLOGY VISIT (OUTPATIENT)
Dept: ONCOLOGY | Facility: CLINIC | Age: 53
End: 2018-10-22
Attending: INTERNAL MEDICINE
Payer: COMMERCIAL

## 2018-10-22 VITALS
HEART RATE: 84 BPM | TEMPERATURE: 98.2 F | SYSTOLIC BLOOD PRESSURE: 151 MMHG | OXYGEN SATURATION: 96 % | WEIGHT: 247.7 LBS | RESPIRATION RATE: 22 BRPM | DIASTOLIC BLOOD PRESSURE: 84 MMHG | HEIGHT: 66 IN | BODY MASS INDEX: 39.81 KG/M2

## 2018-10-22 DIAGNOSIS — M19.90 ARTHRITIS: ICD-10-CM

## 2018-10-22 DIAGNOSIS — E66.3 OVERWEIGHT: ICD-10-CM

## 2018-10-22 DIAGNOSIS — Z85.3 PERSONAL HISTORY OF MALIGNANT NEOPLASM OF BREAST: Primary | ICD-10-CM

## 2018-10-22 PROCEDURE — 99214 OFFICE O/P EST MOD 30 MIN: CPT | Performed by: INTERNAL MEDICINE

## 2018-10-22 PROCEDURE — G0463 HOSPITAL OUTPT CLINIC VISIT: HCPCS

## 2018-10-22 ASSESSMENT — PAIN SCALES - GENERAL: PAINLEVEL: NO PAIN (0)

## 2018-10-22 NOTE — PROGRESS NOTES
"Primary PHYSICIAN:  Cydney Garcia MD      CHIEF COMPLAINT AND REASON FOR Visit:   left breast cancer T1bN0 s/p lumpectomy 11/2014, RT 1/2015.      HISTORY OF PRESENT ILLNESS:  Ann Vargas is a 48-year-old peromenopausal woman had her screening mammogram 09/2014,  found to have new microcalcification in the left upper outer quadrant of the breast around 1 o'clock position 8 cm from the nipple.  Stereotactic biopsy on the left upper outer quadrant of the breast microcalcifications sites, one is negative.  The other one turned out to be invasive ductal cancer, grade 1, negative lymphovascular invasion, no DCIS, ER/% positive, HER-2/seth is negative.  She  did not feel breast mass, no skin changes.  She had post-biopsy hematoma.  Left lumpectomy 11/19/2014 found 5 mm IDC, Grade I, 2 sLN negative, clear margin. She has pT1bN0 disease.    She finished RT 1/2015.   Tamoxifen was advised after. LMP 1/2015. It is changed to Arimidex 4/2016. She has severe muscle pain from it and d/c in 4/2018. It was changed to Aromasin.        OTHER MEDICAL PROBLEMS:  Obesity, hyperlipidemia, GERD.   OCP for yrs d/c 5/2014 found to be post menopausal by blood work in 6/2014      MEDICATIONS:  Reviewed in Epic system.      SOCIAL HISTORY:  She lives in [place].  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  Denies smoking or alcohol abuse.      FAMILY HISTORY:  One great-grandmother had breast cancer, she was a survivor.  One grandmother had disease from ovarian cancer.      REVIEW OF SYSTEMS:   She had negative endometrium biopsy, her LMP remains to be 1/2015.   She has hard time losing her weight.   No more chest wall or breast complains.   She reports muscle ache and joints from AI. This is better on Aromasin.       PHYSICAL EXAMINATION:   VITAL SIGNS: Blood pressure 151/84, pulse 84, temperature 98.2  F (36.8  C), temperature source Tympanic, resp. rate 22, height 1.664 m (5' 5.5\"), weight 112.4 kg (247 lb 11.2 oz), last " "menstrual period 02/17/2015, SpO2 96 %, not currently breastfeeding.  ECOG 0    GENERAL APPEARANCE:  Middle aged woman looks like stated age.  She is a little bit anxious.  She is morbidly obese.  Not in acute distress.   HEENT: The patient is normocephalic, atraumatic. Pupils are equally reactive to light.  Sclerae are anicteric.  Moist oral mucosa.  Negative pharynx.  No oral thrush. Left iritis just saw \"total eye\".  NECK:  Supple.  No jugular venous distention.  Thyroid is not palpable.   LYMPH NODES:  Superficial lymphadenopathy is not appreciable in the bilateral cervical, supraclavicular, axillary or inguinal areas.   CARDIOVASCULAR:  S1, S2 regular with no murmurs or gallops.  No carotid or abdominal bruits.   PULMONARY:  Lungs are clear to auscultation and percussion bilaterally.  There is no wheezing or rhonchi.   GASTROINTESTINAL:  Abdomen is soft, nontender.  No hepatosplenomegaly.  No signs of ascites.  No mass appreciable.   MUSCULOSKELETAL/EXTREMITIES:  No edema.  No cyanotic changes.  No signs of joint deformity.  No lymphedema.   NEUROLOGIC:  Cranial nerves II-XII are grossly intact.  Sensation intact.  Muscle strength and muscle tone symmetrical, 5/5 throughout.   BACK:  No spinal or paraspinal tenderness.  No CVA tenderness.   SKIN:  No petechiae.  No rash.  No signs of cellulitis.   BREASTS:  Bilateral breast exam review.  Bilateral oversized symmetrical breasts.  Right side is clear.  Left side has well healed lumpectomy and sentinel LN scar.      CURRENT DATA REVIEWED  LFT/marker are good.      Current Image Reviewed  MA 7/2018 - negative    Old data reviewed with [employer]  Dexa 11/2017 - nl   Left lumpectomy 11/19/2014 found 5 mm IDC, Grade I, 2 sLN negative, clear margin. She has pT1bN0 disease.    Biopsy 10/29/2014- tereotactic biopsy on the left upper outer quadrant of the breast microcalcifications sites, one is negative.  The other one turned out to be invasive ductal cancer, grade 1, " negative lymphovascular invasion, no DCIS, ER/% positive, HER-2/seth is negative.    Dexa 12/2014: nl gone desity  Bone scan 12/2014: no mets         ASSESSMENT AND PLAN:    1.  left infiltrating ductal cancer, grade 1, ER/% positive, HER 2 negative, negative lymphovascular invasion, no DCIS, diagnosed through screening mammogram.  S/p lumpectomy 11/2014. She finished RT 1/2015.      Tamoxifen was advised after. LMP 1/2015. It was changed to Arimidex 4/2016.   She had severe muscle pain from it and d/c in 4/2018.   It was changed to Aromasin after her symptoms resolved.     We discussed the longer anti hormone data. She is open either way.   She is due 6 months f/u with labs.     2. Baseline obesity.  Weight loss counseling is provided.   She is informed post menopausal obesity is a risk factor for breast cancer.   Weight loss will her joints symptoms too.     3.muscle pain, stiffness from AI - we disc to other med. She can try NSAID.

## 2018-10-22 NOTE — LETTER
10/22/2018         RE: Ann Vargas  [id number]  [facility] [address]        Dear Colleague,    Thank you for referring your patient, Ann Vargas, to the [facility]. Please see a copy of my visit note below.    Primary PHYSICIAN:  Cydney Garcia MD      CHIEF COMPLAINT AND REASON FOR Visit:   left breast cancer T1bN0 s/p lumpectomy 11/2014, RT 1/2015.      HISTORY OF PRESENT ILLNESS:  Ann Vargas is a 48-year-old peromenopausal woman had her screening mammogram 09/2014,  found to have new microcalcification in the left upper outer quadrant of the breast around 1 o'clock position 8 cm from the nipple.  Stereotactic biopsy on the left upper outer quadrant of the breast microcalcifications sites, one is negative.  The other one turned out to be invasive ductal cancer, grade 1, negative lymphovascular invasion, no DCIS, ER/% positive, HER-2/seth is negative.  She  did not feel breast mass, no skin changes.  She had post-biopsy hematoma.  Left lumpectomy 11/19/2014 found 5 mm IDC, Grade I, 2 sLN negative, clear margin. She has pT1bN0 disease.    She finished RT 1/2015.   Tamoxifen was advised after. LMP 1/2015. It is changed to Arimidex 4/2016. She has severe muscle pain from it and d/c in 4/2018. It was changed to Aromasin.        OTHER MEDICAL PROBLEMS:  Obesity, hyperlipidemia, GERD.   OCP for yrs d/c 5/2014 found to be post menopausal by blood work in 6/2014      MEDICATIONS:  Reviewed in Epic system.      SOCIAL HISTORY:  She lives in [place].  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  Denies smoking or alcohol abuse.      FAMILY HISTORY:  One great-grandmother had breast cancer, she was a survivor.  One grandmother had disease from ovarian cancer.      REVIEW OF SYSTEMS:   She had negative endometrium biopsy, her LMP remains to be 1/2015.   She has hard time losing her weight.   No more chest wall or breast complains.   She reports muscle ache and joints from AI. This  "is better on Aromasin.       PHYSICAL EXAMINATION:   VITAL SIGNS: Blood pressure 151/84, pulse 84, temperature 98.2  F (36.8  C), temperature source Tympanic, resp. rate 22, height 1.664 m (5' 5.5\"), weight 112.4 kg (247 lb 11.2 oz), last menstrual period 02/17/2015, SpO2 96 %, not currently breastfeeding.  ECOG 0    GENERAL APPEARANCE:  Middle aged woman looks like stated age.  She is a little bit anxious.  She is morbidly obese.  Not in acute distress.   HEENT: The patient is normocephalic, atraumatic. Pupils are equally reactive to light.  Sclerae are anicteric.  Moist oral mucosa.  Negative pharynx.  No oral thrush. Left iritis just saw \"total eye\".  NECK:  Supple.  No jugular venous distention.  Thyroid is not palpable.   LYMPH NODES:  Superficial lymphadenopathy is not appreciable in the bilateral cervical, supraclavicular, axillary or inguinal areas.   CARDIOVASCULAR:  S1, S2 regular with no murmurs or gallops.  No carotid or abdominal bruits.   PULMONARY:  Lungs are clear to auscultation and percussion bilaterally.  There is no wheezing or rhonchi.   GASTROINTESTINAL:  Abdomen is soft, nontender.  No hepatosplenomegaly.  No signs of ascites.  No mass appreciable.   MUSCULOSKELETAL/EXTREMITIES:  No edema.  No cyanotic changes.  No signs of joint deformity.  No lymphedema.   NEUROLOGIC:  Cranial nerves II-XII are grossly intact.  Sensation intact.  Muscle strength and muscle tone symmetrical, 5/5 throughout.   BACK:  No spinal or paraspinal tenderness.  No CVA tenderness.   SKIN:  No petechiae.  No rash.  No signs of cellulitis.   BREASTS:  Bilateral breast exam review.  Bilateral oversized symmetrical breasts.  Right side is clear.  Left side has well healed lumpectomy and sentinel LN scar.      CURRENT DATA REVIEWED  LFT/marker are good.      Current Image Reviewed  MA 7/2018 - negative    Old data reviewed with [employer]  Dexa 11/2017 - nl   Left lumpectomy 11/19/2014 found 5 mm IDC, Grade I, 2 sLN negative, " clear margin. She has pT1bN0 disease.    Biopsy 10/29/2014- tereotactic biopsy on the left upper outer quadrant of the breast microcalcifications sites, one is negative.  The other one turned out to be invasive ductal cancer, grade 1, negative lymphovascular invasion, no DCIS, ER/% positive, HER-2/seth is negative.    Dexa 12/2014: nl gone desity  Bone scan 12/2014: no mets         ASSESSMENT AND PLAN:    1.  left infiltrating ductal cancer, grade 1, ER/% positive, HER 2 negative, negative lymphovascular invasion, no DCIS, diagnosed through screening mammogram.  S/p lumpectomy 11/2014. She finished RT 1/2015.      Tamoxifen was advised after. LMP 1/2015. It was changed to Arimidex 4/2016.   She had severe muscle pain from it and d/c in 4/2018.   It was changed to Aromasin after her symptoms resolved.     We discussed the longer anti hormone data. She is open either way.   She is due 6 months f/u with labs.     2. Baseline obesity.  Weight loss counseling is provided.   She is informed post menopausal obesity is a risk factor for breast cancer.   Weight loss will her joints symptoms too.     3.muscle pain, stiffness from AI - we disc to other med. She can try NSAID.          Again, thank you for allowing me to participate in the care of your patient.        Sincerely,        Sunita Baird MD, MD

## 2018-10-22 NOTE — NURSING NOTE
"Oncology Rooming Note    October 22, 2018 3:35 PM   Ann Vargas is a 52 year old female who presents for:    Chief Complaint   Patient presents with     Oncology Clinic Visit     6 month follow up breast CA. Review lab and mammogram results.      Initial Vitals: /84 (BP Location: Right arm, Patient Position: Sitting, Cuff Size: Adult Regular)  Pulse 84  Temp 98.2  F (36.8  C) (Tympanic)  Resp 22  Ht 1.664 m (5' 5.5\")  Wt 112.4 kg (247 lb 11.2 oz)  LMP 02/17/2015  SpO2 96%  Breastfeeding? No  BMI 40.59 kg/m2 Estimated body mass index is 40.59 kg/(m^2) as calculated from the following:    Height as of this encounter: 1.664 m (5' 5.5\").    Weight as of this encounter: 112.4 kg (247 lb 11.2 oz). Body surface area is 2.28 meters squared.  No Pain (0) Comment: Data Unavailable   Patient's last menstrual period was 02/17/2015.  Allergies reviewed: Yes  Medications reviewed: Yes    Medications: Medication refills not needed today.  Pharmacy name entered into EPIC:    [facility] [place] [place] [place] [address] [place] [address] PHARMACY [place], [place] - [address] [place] [address]    Clinical concerns: 6 month follow up breast CA. Review lab and mammogram results.     8 minutes for nursing intake (face to face time)     Ann Meadows CMA            "

## 2018-10-22 NOTE — PATIENT INSTRUCTIONS
We would like to see you back in clinic with Dr. Baird in 6 months with labs prior.      When you are in need of a refill, please call your pharmacy and they will send us a request.      Copy of appointments, and after visit summary (AVS) given to patient.      If you have any questions during business hours (M-F 8 AM- 4PM), please call Niya Stacy RN, BSN, OCN Oncology Hematology /Breast Cancer Navigator at [facility] [phone].       For questions after business hours, or on holidays/weekends, please call our after hours Nurse Triage line [phone]. Thank you.          6 months f/u with labs.

## 2018-10-22 NOTE — MR AVS SNAPSHOT
After Visit Summary   10/22/2018    Ann Vargas    MRN: [id number]           Patient Information     Date Of Birth          1965        Visit Information        Provider Department      10/22/2018 3:15 PM Sunita Baird MD [facility]        Today's Diagnoses     Personal history of malignant neoplasm of breast    -  1    Overweight        Arthritis          Care Instructions    We would like to see you back in clinic with Dr. Baird in 6 months with labs prior.      When you are in need of a refill, please call your pharmacy and they will send us a request.      Copy of appointments, and after visit summary (AVS) given to patient.      If you have any questions during business hours (M-F 8 AM- 4PM), please call Niya Stacy, RN, BSN, OCN Oncology Hematology /Breast Cancer Navigator at [facility] [phone].       For questions after business hours, or on holidays/weekends, please call our after hours Nurse Triage line [phone]. Thank you.          6 months f/u with labs.          Follow-ups after your visit        Your next 10 appointments already scheduled     Apr 15, 2019  5:00 PM CDT   LAB with CL LAB   [facility] ([facility])    [address] [facility] [address] [phone]           Please do not eat 10-12 hours before your appointment if you are coming in fasting for labs on lipids, cholesterol, or glucose (sugar). This does not apply to pregnant women. Water, hot tea and black coffee (with nothing added) are okay. Do not drink other fluids, diet soda or chew gum.            Apr 23, 2019  3:15 PM CDT   Return Visit with Sunita Baird MD   [facility] ([facility])    [facility]  [address]  [facility] [id number] [phone]              Future tests that were ordered for you today     Open Future Orders        Priority  "Expected Expires Ordered    Comprehensive metabolic panel Routine 4/1/2019 5/31/2019 10/22/2018    Ca27.29  breast tumor marker Routine 4/1/2019 5/31/2019 10/22/2018            Who to contact     If you have questions or need follow up information about today's clinic visit or your schedule please contact [facility] directly at [phone].  Normal or non-critical lab and imaging results will be communicated to you by Britelyhart, letter or phone within 4 business days after the clinic has received the results. If you do not hear from us within 7 days, please contact the clinic through [employer] or phone. If you have a critical or abnormal lab result, we will notify you by phone as soon as possible.  Submit refill requests through [employer] or call your pharmacy and they will forward the refill request to us. Please allow 3 business days for your refill to be completed.          Additional Information About Your Visit        Britely[employer]on Information     [employer] gives you secure access to your electronic health record. If you see a primary care provider, you can also send messages to your care team and make appointments. If you have questions, please call your primary care clinic.  If you do not have a primary care provider, please call [phone] and they will assist you.        Care EveryWhere ID     This is your Care EveryWhere ID. This could be used by other organizations to access your [place] medical records  [id number]        Your Vitals Were     Pulse Temperature Respirations Height Last Period Pulse Oximetry    84 98.2  F (36.8  C) (Tympanic) 22 1.664 m (5' 5.5\") 02/17/2015 96%    Breastfeeding? BMI (Body Mass Index)                No 40.59 kg/m2           Blood Pressure from Last 3 Encounters:   10/22/18 151/84   04/30/18 138/88   04/16/18 158/81    Weight from Last 3 Encounters:   10/22/18 112.4 kg (247 lb 11.2 oz)   04/30/18 113.9 kg (251 lb)   04/16/18 114.9 kg (253 lb 3.2 oz)               Primary " Care Provider Office Phone # Fax #    Cydney Garcia [phone] [address] [facility] [address]        Equal Access to Services     KATHRYN LEE : Shereen holly tom michelle Acharya, wainoda luqadaha, qaybta kaalmada jose, blanca nuñezyudith gem. So [facility] [phone].    ATENCIÓN: Si habla español, tiene a priest disposición servicios gratuitos de asistencia lingüística. Llame al [phone].    We comply with applicable federal civil rights laws and [place] laws. We do not discriminate on the basis of race, color, national origin, age, disability, sex, sexual orientation, or gender identity.            Thank you!     Thank you for choosing [facility]  for your care. Our goal is always to provide you with excellent care. Hearing back from our patients is one way we can continue to improve our services. Please take a few minutes to complete the written survey that you may receive in the mail after your visit with us. Thank you!             Your Updated Medication List - Protect others around you: Learn how to safely use, store and throw away your medicines at www.disposemymeds.org.          This list is accurate as of 10/22/18  3:55 PM.  Always use your most recent med list.                   Brand Name Dispense Instructions for use Diagnosis    ASPIRIN PO      Take 650 mg by mouth as needed for moderate pain        BENADRYL PO      Take 25 mg by mouth every 4 hours as needed for itching (Hives)    Breast cancer, left (H)       EPINEPHrine 0.3 MG/0.3ML injection 2-pack    ADRENACLICK    0.6 mL    Inject 0.3 mLs (0.3 mg) into the muscle as needed for anaphylaxis        exemestane 25 MG tablet    AROMASIN    90 tablet    Take 1 tablet (25 mg) by mouth daily    Personal history of malignant neoplasm of breast, Muscle pain       LOTEMAX 0.5 % ophthalmic susp   Generic drug:  loteprednol      Place 1 drop into both eyes daily as needed.        montelukast 10 MG  tablet    SINGULAIR    90 tablet    Take 1 tablet (10 mg) by mouth At Bedtime    Chronic allergic rhinitis, unspecified seasonality, unspecified trigger       omeprazole 40 MG capsule    priLOSEC    90 capsule    Take 1 capsule (40 mg) by mouth daily    Gastroesophageal reflux disease without esophagitis       simvastatin 40 MG tablet    ZOCOR    90 tablet    Take 1 tablet (40 mg) by mouth At Bedtime    Hyperlipidemia LDL goal <130       timolol 0.5 % ophthalmic solution    TIMOPTIC      Muscle pain, Personal history of malignant neoplasm of breast

## 2019-03-20 ENCOUNTER — OFFICE VISIT (OUTPATIENT)
Dept: FAMILY MEDICINE | Facility: CLINIC | Age: 54
End: 2019-03-20
Payer: COMMERCIAL

## 2019-03-20 ENCOUNTER — ANCILLARY PROCEDURE (OUTPATIENT)
Dept: GENERAL RADIOLOGY | Facility: CLINIC | Age: 54
End: 2019-03-20
Attending: FAMILY MEDICINE
Payer: COMMERCIAL

## 2019-03-20 ENCOUNTER — TELEPHONE (OUTPATIENT)
Dept: FAMILY MEDICINE | Facility: CLINIC | Age: 54
End: 2019-03-20

## 2019-03-20 VITALS
OXYGEN SATURATION: 98 % | HEIGHT: 66 IN | HEART RATE: 104 BPM | DIASTOLIC BLOOD PRESSURE: 84 MMHG | TEMPERATURE: 99.3 F | WEIGHT: 247.6 LBS | RESPIRATION RATE: 14 BRPM | SYSTOLIC BLOOD PRESSURE: 156 MMHG | BODY MASS INDEX: 39.79 KG/M2

## 2019-03-20 DIAGNOSIS — R05.9 COUGH: ICD-10-CM

## 2019-03-20 DIAGNOSIS — J02.9 SORE THROAT: ICD-10-CM

## 2019-03-20 DIAGNOSIS — J22 LOWER RESPIRATORY INFECTION (E.G., BRONCHITIS, PNEUMONIA, PNEUMONITIS, PULMONITIS): Primary | ICD-10-CM

## 2019-03-20 LAB
DEPRECATED S PYO AG THROAT QL EIA: NORMAL
SPECIMEN SOURCE: NORMAL

## 2019-03-20 PROCEDURE — 87081 CULTURE SCREEN ONLY: CPT | Performed by: FAMILY MEDICINE

## 2019-03-20 PROCEDURE — 99214 OFFICE O/P EST MOD 30 MIN: CPT | Performed by: FAMILY MEDICINE

## 2019-03-20 PROCEDURE — 87880 STREP A ASSAY W/OPTIC: CPT | Performed by: FAMILY MEDICINE

## 2019-03-20 PROCEDURE — 71046 X-RAY EXAM CHEST 2 VIEWS: CPT

## 2019-03-20 RX ORDER — AZITHROMYCIN 250 MG/1
TABLET, FILM COATED ORAL
Qty: 6 TABLET | Refills: 0 | Status: SHIPPED | OUTPATIENT
Start: 2019-03-20 | End: 2019-04-23

## 2019-03-20 ASSESSMENT — MIFFLIN-ST. JEOR: SCORE: 1726.92

## 2019-03-20 NOTE — PROGRESS NOTES
SUBJECTIVE:   Ann Vargas is a 53 year old female who presents to clinic today for the following health issues:      ENT Symptoms             Symptoms: cc Present Absent Comment   Fever/Chills   x    Fatigue  x     Muscle Aches  x     Eye Irritation   x    Sneezing  x     Nasal Jorge/Drg  x     Sinus Pressure/Pain  x     Loss of smell  x     Dental pain  x     Sore Throat  x     Swollen Glands  x     Ear Pain/Fullness   x    Cough x x     Wheeze  x     Chest Pain  x     Shortness of breath  x  With cough   Rash   x    Other         Symptom duration:  2 weeks, worst in the last 3 days   Symptom severity:  severe   Treatments tried:  radha seltzer cold plus - last taken 5 hours ago   Contacts:  co workers     Verified above history with patient.    Patient states his cough, body aches and sinus pressure got a lot worse in the last 3 days.    No hx of asthma or COPD.    Problem list and histories reviewed & adjusted, as indicated.  Additional history: as documented    Patient Active Problem List   Diagnosis     Chronic rhinitis     Gastroesophageal reflux disease without esophagitis     Hyperlipidemia LDL goal <130     Iritis, recurrent     Breast cancer (H)     Malignant neoplasm of left female breast (HCC)     History of left breast cancer     Morbid obesity, unspecified obesity type (H)     Past Surgical History:   Procedure Laterality Date     C/SECTION, CLASSICAL  Aug 93 &     , Classical x 2     EYE SURGERY  ?    detachted retina surgery     LUMPECTOMY BREAST WITH SEED LOCALIZATION Left 2014    Procedure: LUMPECTOMY BREAST WITH SEED LOCALIZATION;  Surgeon: Shonna Leung MD;  Location: [facility]     LUMPECTOMY BREAST WITH SENTINEL NODE, COMBINED Left 2014    Procedure: COMBINED LUMPECTOMY BREAST WITH SENTINEL NODE;  Surgeon: Shonna Leung MD;  Location: [facility]     SURGICAL HISTORY OF -       detached retina       Social History     Tobacco Use      Smoking status: Never Smoker     Smokeless tobacco: Never Used   Substance Use Topics     Alcohol use: Yes     Alcohol/week: 0.0 oz     Comment: -monthly     Family History   Problem Relation Age of Onset     Hypertension Mother      Lipids Mother      Depression Mother         bipolar, also hysterectomy for fibroid     Genitourinary Problems Mother         Kidney  - on transplant list     Allergies Mother      Obesity Mother      Lipids Brother      Allergies Father      Obesity Father      Cancer Maternal Grandmother         ovarian     Cancer Maternal Grandfather         lung         Current Outpatient Medications   Medication Sig Dispense Refill     azithromycin (ZITHROMAX) 250 MG tablet Two tablets orally per day on first day, then one tablet daily for four days. 6 tablet 0     exemestane (AROMASIN) 25 MG tablet Take 1 tablet (25 mg) by mouth daily 90 tablet 3     montelukast (SINGULAIR) 10 MG tablet Take 1 tablet (10 mg) by mouth At Bedtime 90 tablet 3     omeprazole (PRILOSEC) 40 MG capsule Take 1 capsule (40 mg) by mouth daily 90 capsule 3     simvastatin (ZOCOR) 40 MG tablet Take 1 tablet (40 mg) by mouth At Bedtime 90 tablet 3     timolol (TIMOPTIC) 0.5 % ophthalmic solution        ASPIRIN PO Take 650 mg by mouth as needed for moderate pain        DiphenhydrAMINE HCl (BENADRYL PO) Take 25 mg by mouth every 4 hours as needed for itching (Hives)       EPINEPHrine (ADRENACLICK) 0.3 MG/0.3ML injection Inject 0.3 mLs (0.3 mg) into the muscle as needed for anaphylaxis (Patient not taking: Reported on 3/20/2019) 0.6 mL 0     loteprednol (LOTEMAX) 0.5 % ophthalmic suspension Place 1 drop into both eyes daily as needed.       Allergies   Allergen Reactions     Yellow Dye Anaphylaxis     Yellow dye #5/#6      Nka [No Known Allergies]        Reviewed and updated as needed this visit by clinical staff  Tobacco  Allergies  Meds  Med Hx  Surg Hx  Fam Hx  Soc Hx      Reviewed and updated as needed this visit by  "Provider  Allergies         ROS:  C: NEGATIVE for fever, chills, change in weight  I: NEGATIVE for worrisome rashes, moles or lesions  E: NEGATIVE for vision changes or irritation  ENT/MOUTH: see above  RESP:as above  CV: NEGATIVE for chest pain, palpitations or peripheral edema  GI: NEGATIVE for nausea, abdominal pain, heartburn, or change in bowel habits  M: NEGATIVE for significant arthralgias or myalgia    OBJECTIVE:                                                    /84   Pulse 104   Temp 99.3  F (37.4  C) (Tympanic)   Resp 14   Ht 1.664 m (5' 5.5\")   Wt 112.3 kg (247 lb 9.6 oz)   LMP 02/17/2015   SpO2 98%   BMI 40.58 kg/m    Body mass index is 40.58 kg/m .  GENERAL: well-nourished,  alert and no distress, coughs frequently  EYES: pink conjunctivae, no icterus  NECK: moderate cervical adenopathy, nontender  HEENT: tympanic membrane intact and pearly bilaterally, nose with mild congestion, no sinus tenderness, throat moderately erythematous, tonsils grade +1 w/ no exudates, no oral ulcers  RESP: good air entry, equal breath sounds, no wheezing; no crackles; diffuse bronchial breath sounds but more on bilateral lower lungs  CV: slightly tachycardic rate, regular rhythm, normal S1 S2, no S3 or S4 and no murmur  SKIN:  Good turgor, no rashes    Diagnostic test results:  Diagnostic Test Results:  Results for orders placed or performed in visit on 03/20/19 (from the past 24 hour(s))   Rapid strep screen   Result Value Ref Range    Specimen Description Throat     Rapid Strep A Screen       NEGATIVE: No Group A streptococcal antigen detected by immunoassay, await culture report.   XR Chest 2 Views    Narrative    XR CHEST 2 VW   3/20/2019 3:52 PM     HISTORY: r/o pneumonia; Cough    COMPARISON: 12/30/2014      Impression    IMPRESSION: No definite acute abnormality.    RIGO FIELD MD        ASSESSMENT/PLAN:                                                        ICD-10-CM    1. Lower respiratory " infection (e.g., bronchitis, pneumonia, pneumonitis, pulmonitis) J22 azithromycin (ZITHROMAX) 250 MG tablet  Patient was advised xray did not show pneumonia per radiology.  However, due to duration of symptoms, worsening in last 3 days, and exam findings, will treat as bacterial bronchitis.  Advised symptomatic measures in detail.  Patient asked for other cough suppressant other than the OTC she has taken. Discussed options. However, the alternatives bring up alert on yellow dye content so patient declined other Rx.  Push oral fluids.  Return precautions discussed and given to patient.     2. Sore throat J02.9 Rapid strep screen     Beta strep group A culture  Advised patient  of negative screen; will wait for culture.       3. Cough R05 XR Chest 2 Views       Follow up with Provider - 2-3 days if worsening   Patient Instructions         Thank you for choosing [facility]  You may be receiving an email and/or telephone survey request from [facility] Customer Experience regarding your visit today.  Please take a few minutes to respond to the survey to let us know how we are doing.      If you have questions or concerns, please contact us via [employer] or you can contact your care team at [phone].    Our Clinic hours are:  Monday 6:40 am  to 7:00 pm  Tuesday -Friday 6:40 am to 5:00 pm    The [place] outpatient lab hours are:  Monday - Friday 6:10 am to 4:45 pm  Saturdays 7:00 am to 11:00 am  Appointments are required, call [phone]    If you have clinical questions after hours or would like to schedule an appointment,  call the clinic at [phone].        Avery Saldivar MD  [facility]

## 2019-03-20 NOTE — TELEPHONE ENCOUNTER
Panel Management Review      Patient has the following on her problem list: None      Composite cancer screening  Chart review shows that this patient is due/due soon for the following Fecal Colorectal (FIT)  Summary:    Patient is due/failing the following:   FIT    Action needed:   Patient needs referral/order: fit    Type of outreach:    Phone, left message for patient to call back.     Questions for provider review:    None                                                                                                                                    Hilary Freeman MA       Chart routed to Care Team .

## 2019-03-21 LAB
BACTERIA SPEC CULT: NORMAL
SPECIMEN SOURCE: NORMAL

## 2019-04-15 DIAGNOSIS — Z85.3 PERSONAL HISTORY OF MALIGNANT NEOPLASM OF BREAST: ICD-10-CM

## 2019-04-15 LAB
ALBUMIN SERPL-MCNC: 3.7 G/DL (ref 3.4–5)
ALP SERPL-CCNC: 149 U/L (ref 40–150)
ALT SERPL W P-5'-P-CCNC: 39 U/L (ref 0–50)
ANION GAP SERPL CALCULATED.3IONS-SCNC: 4 MMOL/L (ref 3–14)
AST SERPL W P-5'-P-CCNC: 21 U/L (ref 0–45)
BILIRUB SERPL-MCNC: 0.5 MG/DL (ref 0.2–1.3)
BUN SERPL-MCNC: 17 MG/DL (ref 7–30)
CALCIUM SERPL-MCNC: 10 MG/DL (ref 8.5–10.1)
CANCER AG27-29 SERPL-ACNC: 20 U/ML (ref 0–39)
CHLORIDE SERPL-SCNC: 106 MMOL/L (ref 94–109)
CO2 SERPL-SCNC: 27 MMOL/L (ref 20–32)
CREAT SERPL-MCNC: 0.73 MG/DL (ref 0.52–1.04)
GFR SERPL CREATININE-BSD FRML MDRD: >90 ML/MIN/{1.73_M2}
GLUCOSE SERPL-MCNC: 87 MG/DL (ref 70–99)
POTASSIUM SERPL-SCNC: 4.1 MMOL/L (ref 3.4–5.3)
PROT SERPL-MCNC: 7.7 G/DL (ref 6.8–8.8)
SODIUM SERPL-SCNC: 137 MMOL/L (ref 133–144)

## 2019-04-15 PROCEDURE — 36415 COLL VENOUS BLD VENIPUNCTURE: CPT | Performed by: INTERNAL MEDICINE

## 2019-04-15 PROCEDURE — 86300 IMMUNOASSAY TUMOR CA 15-3: CPT | Performed by: INTERNAL MEDICINE

## 2019-04-15 PROCEDURE — 80053 COMPREHEN METABOLIC PANEL: CPT | Performed by: INTERNAL MEDICINE

## 2019-04-23 ENCOUNTER — ONCOLOGY VISIT (OUTPATIENT)
Dept: ONCOLOGY | Facility: CLINIC | Age: 54
End: 2019-04-23
Attending: INTERNAL MEDICINE
Payer: COMMERCIAL

## 2019-04-23 VITALS
HEIGHT: 66 IN | DIASTOLIC BLOOD PRESSURE: 79 MMHG | HEART RATE: 89 BPM | BODY MASS INDEX: 39.65 KG/M2 | TEMPERATURE: 98.7 F | RESPIRATION RATE: 20 BRPM | OXYGEN SATURATION: 96 % | SYSTOLIC BLOOD PRESSURE: 135 MMHG | WEIGHT: 246.7 LBS

## 2019-04-23 DIAGNOSIS — Z12.39 SCREENING BREAST EXAMINATION: Primary | ICD-10-CM

## 2019-04-23 DIAGNOSIS — E66.3 OVERWEIGHT: ICD-10-CM

## 2019-04-23 DIAGNOSIS — M19.90 ARTHRITIS: ICD-10-CM

## 2019-04-23 PROCEDURE — G0463 HOSPITAL OUTPT CLINIC VISIT: HCPCS

## 2019-04-23 PROCEDURE — 99214 OFFICE O/P EST MOD 30 MIN: CPT | Performed by: INTERNAL MEDICINE

## 2019-04-23 ASSESSMENT — PAIN SCALES - GENERAL: PAINLEVEL: NO PAIN (0)

## 2019-04-23 ASSESSMENT — MIFFLIN-ST. JEOR: SCORE: 1722.83

## 2019-04-23 NOTE — PROGRESS NOTES
"Primary PHYSICIAN:  Cydney Garcia MD      CHIEF COMPLAINT AND REASON FOR Visit:   left breast cancer T1bN0 s/p lumpectomy 11/2014, RT 1/2015.      HISTORY OF PRESENT ILLNESS:  Ann Vargas is a 48-year-old peromenopausal woman had her screening mammogram 09/2014,  found to have new microcalcification in the left upper outer quadrant of the breast around 1 o'clock position 8 cm from the nipple.  Stereotactic biopsy on the left upper outer quadrant of the breast microcalcifications sites, one is negative.  The other one turned out to be invasive ductal cancer, grade 1, negative lymphovascular invasion, no DCIS, ER/% positive, HER-2/seth is negative.  She  did not feel breast mass, no skin changes.  She had post-biopsy hematoma.  Left lumpectomy 11/19/2014 found 5 mm IDC, Grade I, 2 sLN negative, clear margin. She has pT1bN0 disease.    She finished RT 1/2015.   Tamoxifen was advised after. LMP 1/2015. It is changed to Arimidex 4/2016. She has severe muscle pain from it and d/c in 4/2018. It was changed to Aromasin.        OTHER MEDICAL PROBLEMS:  Obesity, hyperlipidemia, GERD.   OCP for yrs d/c 5/2014 found to be post menopausal by blood work in 6/2014      MEDICATIONS:  Reviewed in Epic system.      SOCIAL HISTORY:  She lives in [place].  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  Denies smoking or alcohol abuse.      FAMILY HISTORY:  One great-grandmother had breast cancer, she was a survivor.  One grandmother had disease from ovarian cancer.      REVIEW OF SYSTEMS:   She had negative endometrium biopsy, her LMP remains to be 1/2015.   She has hard time losing her weight.   No more chest wall or breast complains.   She reported muscle ache and joints from AI. This is better on Aromasin.       PHYSICAL EXAMINATION:   VITAL SIGNS: Blood pressure 135/79, pulse 89, temperature 98.7  F (37.1  C), temperature source Axillary, resp. rate 20, height 1.664 m (5' 5.5\"), weight 111.9 kg (246 lb 11.2 oz), last " "menstrual period 02/17/2015, SpO2 96 %, not currently breastfeeding.  ECOG 0    GENERAL APPEARANCE:  Middle aged woman looks like stated age.  She is a little bit anxious.  She is morbidly obese.  Not in acute distress.   HEENT: The patient is normocephalic, atraumatic. Pupils are equally reactive to light.  Sclerae are anicteric.  Moist oral mucosa.  Negative pharynx.  No oral thrush. Left iritis just saw \"total eye\".  NECK:  Supple.  No jugular venous distention.  Thyroid is not palpable.   LYMPH NODES:  Superficial lymphadenopathy is not appreciable in the bilateral cervical, supraclavicular, axillary or inguinal areas.   CARDIOVASCULAR:  S1, S2 regular with no murmurs or gallops.  No carotid or abdominal bruits.   PULMONARY:  Lungs are clear to auscultation and percussion bilaterally.  There is no wheezing or rhonchi.   GASTROINTESTINAL:  Abdomen is soft, nontender.  No hepatosplenomegaly.  No signs of ascites.  No mass appreciable.   MUSCULOSKELETAL/EXTREMITIES:  No edema.  No cyanotic changes.  No signs of joint deformity.  No lymphedema.   NEUROLOGIC:  Cranial nerves II-XII are grossly intact.  Sensation intact.  Muscle strength and muscle tone symmetrical, 5/5 throughout.   BACK:  No spinal or paraspinal tenderness.  No CVA tenderness.   SKIN:  No petechiae.  No rash.  No signs of cellulitis.   BREASTS:  Bilateral breast exam review.  Bilateral oversized symmetrical breasts.  Right side is clear.  Left side has well healed lumpectomy and sentinel LN scar.      CURRENT DATA REVIEWED  CMP/marker are good.      Current Image Reviewed  MA 7/2018 - negative    Old data reviewed with [employer]  Dexa 11/2017 - nl   Left lumpectomy 11/19/2014 found 5 mm IDC, Grade I, 2 sLN negative, clear margin. She has pT1bN0 disease.    Biopsy 10/29/2014- tereotactic biopsy on the left upper outer quadrant of the breast microcalcifications sites, one is negative.  The other one turned out to be invasive ductal cancer, grade 1, " negative lymphovascular invasion, no DCIS, ER/% positive, HER-2/seth is negative.    Dexa 12/2014: nl gone desity  Bone scan 12/2014: no mets         ASSESSMENT AND PLAN:    1.  left infiltrating ductal cancer, grade 1, ER/% positive, HER 2 negative, negative lymphovascular invasion, no DCIS, diagnosed through screening mammogram.  S/p lumpectomy 11/2014. She finished RT 1/2015.      Tamoxifen was advised after. LMP 1/2015. It was changed to Arimidex 4/2016.   She had severe muscle pain from it and d/c in 4/2018.   It was changed to Aromasin after her symptoms resolved.     We discussed the longer anti hormone data. She is open either way.   She is due 6 months f/u with labs.     2. Baseline obesity.  Weight loss counseling is provided.   She is informed post menopausal obesity is a risk factor for breast cancer.   Weight loss will her joints symptoms too.     3. Muscle pain, stiffness from AI. She can try NSAID.

## 2019-04-23 NOTE — PATIENT INSTRUCTIONS
Dr. Baird would like you to have a Mammogram in July.     We would like to see you back in 6 months for a follow up appointment with labs prior.     When you are in need of a refill, please call your pharmacy and they will send us a request.      Copy of appointments, and after visit summary (AVS) given to patient.      If you have any questions please call Saloni Larry RN, BSN Oncology Hematology  [facility] [phone]. For questions after business hours, or on holidays/weekends, please call our after hours Nurse Triage line [phone]. Thank you.         Ronny COVINGTON in 7/2019.  6 months f/u with labs.

## 2019-04-23 NOTE — LETTER
4/23/2019         RE: Ann Vargas  [id number]  [facility] [address]        Dear Colleague,    Thank you for referring your patient, Ann Vargas, to the [facility]. Please see a copy of my visit note below.    Primary PHYSICIAN:  Cydney Garcia MD      CHIEF COMPLAINT AND REASON FOR Visit:   left breast cancer T1bN0 s/p lumpectomy 11/2014, RT 1/2015.      HISTORY OF PRESENT ILLNESS:  Ann Vargas is a 48-year-old peromenopausal woman had her screening mammogram 09/2014,  found to have new microcalcification in the left upper outer quadrant of the breast around 1 o'clock position 8 cm from the nipple.  Stereotactic biopsy on the left upper outer quadrant of the breast microcalcifications sites, one is negative.  The other one turned out to be invasive ductal cancer, grade 1, negative lymphovascular invasion, no DCIS, ER/% positive, HER-2/seth is negative.  She  did not feel breast mass, no skin changes.  She had post-biopsy hematoma.  Left lumpectomy 11/19/2014 found 5 mm IDC, Grade I, 2 sLN negative, clear margin. She has pT1bN0 disease.    She finished RT 1/2015.   Tamoxifen was advised after. LMP 1/2015. It is changed to Arimidex 4/2016. She has severe muscle pain from it and d/c in 4/2018. It was changed to Aromasin.        OTHER MEDICAL PROBLEMS:  Obesity, hyperlipidemia, GERD.   OCP for yrs d/c 5/2014 found to be post menopausal by blood work in 6/2014      MEDICATIONS:  Reviewed in Epic system.      SOCIAL HISTORY:  She lives in [place].  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  Denies smoking or alcohol abuse.      FAMILY HISTORY:  One great-grandmother had breast cancer, she was a survivor.  One grandmother had disease from ovarian cancer.      REVIEW OF SYSTEMS:   She had negative endometrium biopsy, her LMP remains to be 1/2015.   She has hard time losing her weight.   No more chest wall or breast complains.   She reported muscle ache and joints from AI. This  "is better on Aromasin.       PHYSICAL EXAMINATION:   VITAL SIGNS: Blood pressure 135/79, pulse 89, temperature 98.7  F (37.1  C), temperature source Axillary, resp. rate 20, height 1.664 m (5' 5.5\"), weight 111.9 kg (246 lb 11.2 oz), last menstrual period 02/17/2015, SpO2 96 %, not currently breastfeeding.  ECOG 0    GENERAL APPEARANCE:  Middle aged woman looks like stated age.  She is a little bit anxious.  She is morbidly obese.  Not in acute distress.   HEENT: The patient is normocephalic, atraumatic. Pupils are equally reactive to light.  Sclerae are anicteric.  Moist oral mucosa.  Negative pharynx.  No oral thrush. Left iritis just saw \"total eye\".  NECK:  Supple.  No jugular venous distention.  Thyroid is not palpable.   LYMPH NODES:  Superficial lymphadenopathy is not appreciable in the bilateral cervical, supraclavicular, axillary or inguinal areas.   CARDIOVASCULAR:  S1, S2 regular with no murmurs or gallops.  No carotid or abdominal bruits.   PULMONARY:  Lungs are clear to auscultation and percussion bilaterally.  There is no wheezing or rhonchi.   GASTROINTESTINAL:  Abdomen is soft, nontender.  No hepatosplenomegaly.  No signs of ascites.  No mass appreciable.   MUSCULOSKELETAL/EXTREMITIES:  No edema.  No cyanotic changes.  No signs of joint deformity.  No lymphedema.   NEUROLOGIC:  Cranial nerves II-XII are grossly intact.  Sensation intact.  Muscle strength and muscle tone symmetrical, 5/5 throughout.   BACK:  No spinal or paraspinal tenderness.  No CVA tenderness.   SKIN:  No petechiae.  No rash.  No signs of cellulitis.   BREASTS:  Bilateral breast exam review.  Bilateral oversized symmetrical breasts.  Right side is clear.  Left side has well healed lumpectomy and sentinel LN scar.      CURRENT DATA REVIEWED  CMP/marker are good.      Current Image Reviewed  MA 7/2018 - negative    Old data reviewed with [employer]  Dexa 11/2017 - nl   Left lumpectomy 11/19/2014 found 5 mm IDC, Grade I, 2 sLN negative, " clear margin. She has pT1bN0 disease.    Biopsy 10/29/2014- tereotactic biopsy on the left upper outer quadrant of the breast microcalcifications sites, one is negative.  The other one turned out to be invasive ductal cancer, grade 1, negative lymphovascular invasion, no DCIS, ER/% positive, HER-2/seth is negative.    Dexa 12/2014: nl gone desity  Bone scan 12/2014: no mets         ASSESSMENT AND PLAN:    1.  left infiltrating ductal cancer, grade 1, ER/% positive, HER 2 negative, negative lymphovascular invasion, no DCIS, diagnosed through screening mammogram.  S/p lumpectomy 11/2014. She finished RT 1/2015.      Tamoxifen was advised after. LMP 1/2015. It was changed to Arimidex 4/2016.   She had severe muscle pain from it and d/c in 4/2018.   It was changed to Aromasin after her symptoms resolved.     We discussed the longer anti hormone data. She is open either way.   She is due 6 months f/u with labs.     2. Baseline obesity.  Weight loss counseling is provided.   She is informed post menopausal obesity is a risk factor for breast cancer.   Weight loss will her joints symptoms too.     3. Muscle pain, stiffness from AI. She can try NSAID.          Again, thank you for allowing me to participate in the care of your patient.        Sincerely,        Sunita Baird MD, MD

## 2019-04-23 NOTE — NURSING NOTE
"Oncology Rooming Note    April 23, 2019 3:21 PM   Ann Vargas is a 53 year old female who presents for:    Chief Complaint   Patient presents with     Oncology Clinic Visit     6 mo f/up Personal history of malignant neoplasm of breast, lab results     Initial Vitals: /79 (BP Location: Right arm, Patient Position: Sitting, Cuff Size: Adult Large)   Pulse 89   Temp 98.7  F (37.1  C) (Axillary)   Resp 20   Ht 1.664 m (5' 5.5\")   Wt 111.9 kg (246 lb 11.2 oz)   LMP 02/17/2015   SpO2 96%   BMI 40.43 kg/m   Estimated body mass index is 40.43 kg/m  as calculated from the following:    Height as of this encounter: 1.664 m (5' 5.5\").    Weight as of this encounter: 111.9 kg (246 lb 11.2 oz). Body surface area is 2.27 meters squared.  No Pain (0) Comment: Data Unavailable   Patient's last menstrual period was 02/17/2015.  Allergies reviewed: Yes  Medications reviewed: Yes    Medications: Medication refills not needed today.  Pharmacy name entered into EPIC:    [email or web address] - [facility], [place] - [address] [place] [address] [facility] [place], [place] - [address] [place] [address]    Clinical concerns:  6 mo f/up Personal history of malignant neoplasm of breast, lab results      Jessica Veloz LPN              "

## 2019-05-07 DIAGNOSIS — Z85.3 PERSONAL HISTORY OF MALIGNANT NEOPLASM OF BREAST: ICD-10-CM

## 2019-05-07 DIAGNOSIS — M79.10 MUSCLE PAIN: ICD-10-CM

## 2019-05-07 RX ORDER — EXEMESTANE 25 MG/1
25 TABLET ORAL DAILY
Qty: 90 TABLET | Refills: 3 | Status: SHIPPED | OUTPATIENT
Start: 2019-05-07 | End: 2022-03-30

## 2019-05-22 ENCOUNTER — TELEPHONE (OUTPATIENT)
Dept: FAMILY MEDICINE | Facility: CLINIC | Age: 54
End: 2019-05-22

## 2019-05-22 DIAGNOSIS — E78.5 HYPERLIPIDEMIA LDL GOAL <130: ICD-10-CM

## 2019-05-22 DIAGNOSIS — J30.9 CHRONIC ALLERGIC RHINITIS: ICD-10-CM

## 2019-05-22 NOTE — TELEPHONE ENCOUNTER
"Requested Prescriptions   Pending Prescriptions Disp Refills     simvastatin (ZOCOR) 40 MG tablet [Pharmacy Med Name: SIMVASTATIN TABS 40MG] 90 tablet 3     Sig: TAKE 1 TABLET AT BEDTIME       Statins Protocol Failed - 5/22/2019 12:17 AM        Failed - LDL on file in past 12 months     Recent Labs   Lab Test 04/09/18  1659   *             Failed - Recent (12 mo) or future (30 days) visit within the authorizing provider's specialty     Patient had office visit in the last 12 months or has a visit in the next 30 days with authorizing provider or within the authorizing provider's specialty.  See \"Patient Info\" tab in inbasket, or \"Choose Columns\" in Meds & Orders section of the refill encounter.              Passed - No abnormal creatine kinase in past 12 months     No lab results found.             Passed - Medication is active on med list        Passed - Patient is age 18 or older        Passed - No active pregnancy on record        Passed - No positive pregnancy test in past 12 months   Last Written Prescription Date:  4/30/18  Last Fill Quantity: 90,  # refills: 3   Last office visit: 3/20/2019 with prescribing provider:  Jose   Future Office Visit:         montelukast (SINGULAIR) 10 MG tablet [Pharmacy Med Name: MONTELUKAST SOD TABS 10MG] 90 tablet 3     Sig: TAKE 1 TABLET AT BEDTIME       Leukotriene Inhibitors Protocol Failed - 5/22/2019 12:17 AM        Failed - Recent (12 mo) or future (30 days) visit within the authorizing provider's specialty     Patient had office visit in the last 12 months or has a visit in the next 30 days with authorizing provider or within the authorizing provider's specialty.  See \"Patient Info\" tab in inbasket, or \"Choose Columns\" in Meds & Orders section of the refill encounter.              Passed - Patient is age 12 or older     If patient is under 16, ok to refill using age based dosing.           Passed - Medication is active on med list        Last Written " Prescription Date:  4/30/18  Last Fill Quantity: 90,  # refills: 3   Last office visit: 3/20/2019 with prescribing provider:  Jose   Future Office Visit:

## 2019-05-24 NOTE — TELEPHONE ENCOUNTER
I have attempted to contact this patient by phone with the following results: left message to return my call on answering machine. Needs a follow-up appointment.    Shayla Schultz RN

## 2019-05-29 RX ORDER — MONTELUKAST SODIUM 10 MG/1
TABLET ORAL
Qty: 90 TABLET | Refills: 3 | OUTPATIENT
Start: 2019-05-29

## 2019-05-29 RX ORDER — SIMVASTATIN 40 MG
TABLET ORAL
Qty: 90 TABLET | Refills: 3 | OUTPATIENT
Start: 2019-05-29

## 2019-07-08 ENCOUNTER — OFFICE VISIT (OUTPATIENT)
Dept: FAMILY MEDICINE | Facility: CLINIC | Age: 54
End: 2019-07-08
Payer: COMMERCIAL

## 2019-07-08 VITALS
BODY MASS INDEX: 39.53 KG/M2 | OXYGEN SATURATION: 96 % | HEIGHT: 66 IN | SYSTOLIC BLOOD PRESSURE: 124 MMHG | DIASTOLIC BLOOD PRESSURE: 84 MMHG | WEIGHT: 246 LBS | TEMPERATURE: 98.4 F | HEART RATE: 88 BPM | RESPIRATION RATE: 18 BRPM

## 2019-07-08 DIAGNOSIS — J30.9 CHRONIC ALLERGIC RHINITIS: ICD-10-CM

## 2019-07-08 DIAGNOSIS — Z12.11 SPECIAL SCREENING FOR MALIGNANT NEOPLASMS, COLON: Primary | ICD-10-CM

## 2019-07-08 DIAGNOSIS — K21.9 GASTROESOPHAGEAL REFLUX DISEASE WITHOUT ESOPHAGITIS: ICD-10-CM

## 2019-07-08 DIAGNOSIS — E78.5 HYPERLIPIDEMIA LDL GOAL <130: ICD-10-CM

## 2019-07-08 DIAGNOSIS — T78.2XXS ANAPHYLAXIS, SEQUELA: ICD-10-CM

## 2019-07-08 LAB
CHOLEST SERPL-MCNC: 165 MG/DL
HDLC SERPL-MCNC: 68 MG/DL
LDLC SERPL CALC-MCNC: 62 MG/DL
NONHDLC SERPL-MCNC: 97 MG/DL
TRIGL SERPL-MCNC: 175 MG/DL

## 2019-07-08 PROCEDURE — 99214 OFFICE O/P EST MOD 30 MIN: CPT | Performed by: FAMILY MEDICINE

## 2019-07-08 PROCEDURE — 80061 LIPID PANEL: CPT | Performed by: FAMILY MEDICINE

## 2019-07-08 PROCEDURE — 36415 COLL VENOUS BLD VENIPUNCTURE: CPT | Performed by: FAMILY MEDICINE

## 2019-07-08 RX ORDER — SIMVASTATIN 40 MG
40 TABLET ORAL AT BEDTIME
Qty: 90 TABLET | Refills: 3 | Status: SHIPPED | OUTPATIENT
Start: 2019-07-08 | End: 2020-07-02

## 2019-07-08 RX ORDER — MONTELUKAST SODIUM 10 MG/1
1 TABLET ORAL AT BEDTIME
Qty: 90 TABLET | Refills: 3 | Status: SHIPPED | OUTPATIENT
Start: 2019-07-08 | End: 2020-07-02

## 2019-07-08 RX ORDER — OMEPRAZOLE 40 MG/1
40 CAPSULE, DELAYED RELEASE ORAL DAILY
Qty: 90 CAPSULE | Refills: 3 | Status: SHIPPED | OUTPATIENT
Start: 2019-07-08 | End: 2020-08-10

## 2019-07-08 RX ORDER — EPINEPHRINE 0.3 MG/.3ML
0.3 INJECTION SUBCUTANEOUS PRN
Qty: 0.6 ML | Refills: 1 | Status: SHIPPED | OUTPATIENT
Start: 2019-07-08 | End: 2020-11-09

## 2019-07-08 ASSESSMENT — PAIN SCALES - GENERAL: PAINLEVEL: NO PAIN (0)

## 2019-07-08 ASSESSMENT — MIFFLIN-ST. JEOR: SCORE: 1719.66

## 2019-07-08 NOTE — PATIENT INSTRUCTIONS
Health Maintenance reviewed and plan for update discussed.  complete fit test      Our Clinic hours are:  Mondays    7:20 am - 7 pm  Tues -  Fri  7:20 am - 5 pm    Clinic Phone: [phone]    The clinic lab opens at 7:30 am Mon - Fri and appointments are required.    [facility]  Ph. [phone]  Monday  8 am - 7pm  Tues - Fri 8 am - 5:30 pm

## 2019-07-08 NOTE — PROGRESS NOTES
Subjective     Ann Vargas is a 53 year old female who presents to clinic today for the following health issues:    HPI   Hyperlipidemia Follow-Up      Are you having any of the following symptoms? (Select all that apply)  No complaints of shortness of breath, chest pain or pressure.  No increased sweating or nausea with activity.  No left-sided neck or arm pain.  No complaints of pain in calves when walking 1-2 blocks.    Are you regularly taking any medication or supplement to lower your cholesterol?   Yes- simvastain    Are you having muscle aches or other side effects that you think could be caused by your cholesterol lowering medication?  No        Amount of exercise or physical activity: 1 day/week for an average of 15-30 minutes    Problems taking medications regularly: No    Medication side effects: none    Diet: regular (no restrictions)          Patient Active Problem List   Diagnosis     Chronic rhinitis     Gastroesophageal reflux disease without esophagitis     Hyperlipidemia LDL goal <130     Iritis, recurrent     Breast cancer (H)     Malignant neoplasm of left female breast (HCC)     History of left breast cancer     Morbid obesity, unspecified obesity type (H)     Past Surgical History:   Procedure Laterality Date     C/SECTION, CLASSICAL  Aug 93 &     , Classical x 2     EYE SURGERY  ?    detachted retina surgery     LUMPECTOMY BREAST WITH SEED LOCALIZATION Left 2014    Procedure: LUMPECTOMY BREAST WITH SEED LOCALIZATION;  Surgeon: Shonna Leung MD;  Location: [facility]     LUMPECTOMY BREAST WITH SENTINEL NODE, COMBINED Left 2014    Procedure: COMBINED LUMPECTOMY BREAST WITH SENTINEL NODE;  Surgeon: Shonna Leung MD;  Location: [facility]     SURGICAL HISTORY OF -       detached retina       Social History     Tobacco Use     Smoking status: Never Smoker     Smokeless tobacco: Never Used   Substance Use Topics     Alcohol use: Yes      "Alcohol/week: 0.0 oz     Comment: -monthly     Family History   Problem Relation Age of Onset     Hypertension Mother      Lipids Mother      Depression Mother         bipolar, also hysterectomy for fibroid     Genitourinary Problems Mother         Kidney  - on transplant list     Allergies Mother      Obesity Mother      Lipids Brother      Allergies Father      Obesity Father      Cancer Maternal Grandmother         ovarian     Cancer Maternal Grandfather         lung             Reviewed and updated as needed this visit by Provider         Review of Systems   ROS COMP: Constitutional, HEENT, cardiovascular, pulmonary, gi and gu systems are negative, except as otherwise noted.      Objective    /84   Pulse 88   Temp 98.4  F (36.9  C) (Tympanic)   Resp 18   Ht 1.664 m (5' 5.5\")   Wt 111.6 kg (246 lb)   LMP 02/17/2015   SpO2 96%   BMI 40.31 kg/m    Body mass index is 40.31 kg/m .  Physical Exam   GENERAL: healthy, alert and no distress  NECK: no adenopathy, no asymmetry, masses, or scars and thyroid normal to palpation  RESP: lungs clear to auscultation - no rales, rhonchi or wheezes  CV: regular rate and rhythm, normal S1 S2, no S3 or S4, no murmur, click or rub, no peripheral edema and peripheral pulses strong  ABDOMEN: soft, nontender, no hepatosplenomegaly, no masses and bowel sounds normal  MS: no gross musculoskeletal defects noted, no edema    Diagnostic Test Results:  Labs reviewed in Epic        Assessment & Plan       ICD-10-CM    1. Special screening for malignant neoplasms, colon Z12.11 Fecal colorectal cancer screen (FIT)   2. Gastroesophageal reflux disease without esophagitis K21.9 omeprazole (PRILOSEC) 40 MG DR capsule   3. Hyperlipidemia LDL goal <130 E78.5 simvastatin (ZOCOR) 40 MG tablet     Lipid panel reflex to direct LDL Non-fasting     CANCELED: Lipid panel reflex to direct LDL Fasting   4. Chronic allergic rhinitis J30.9 montelukast (SINGULAIR) 10 MG tablet   5. Anaphylaxis, " "sequela T78.2XXS EPINEPHrine (ADRENACLICK) 0.3 MG/0.3ML injection 2-pack        BMI:   Estimated body mass index is 40.31 kg/m  as calculated from the following:    Height as of this encounter: 1.664 m (5' 5.5\").    Weight as of this encounter: 111.6 kg (246 lb).   Weight management plan: Discussed healthy diet and exercise guidelines            No follow-ups on file.    Cydney Garcia MD  [facility]      "

## 2019-07-09 NOTE — RESULT ENCOUNTER NOTE
Ann,    All of the labs were normal or acceptable. Triglycerides improved from previous.    Please contact my office if you have questions.    Cydney Garcia M.D.

## 2019-07-29 ENCOUNTER — HOSPITAL ENCOUNTER (OUTPATIENT)
Dept: MAMMOGRAPHY | Facility: CLINIC | Age: 54
Discharge: HOME | End: 2019-07-29
Attending: INTERNAL MEDICINE | Admitting: INTERNAL MEDICINE
Payer: COMMERCIAL

## 2019-07-29 DIAGNOSIS — Z12.39 SCREENING BREAST EXAMINATION: ICD-10-CM

## 2019-07-29 PROCEDURE — 77063 BREAST TOMOSYNTHESIS BI: CPT

## 2019-10-08 DIAGNOSIS — K21.9 GASTROESOPHAGEAL REFLUX DISEASE WITHOUT ESOPHAGITIS: ICD-10-CM

## 2019-10-09 NOTE — TELEPHONE ENCOUNTER
"Requested Prescriptions   Pending Prescriptions Disp Refills     omeprazole (PRILOSEC) 40 MG DR capsule [Pharmacy Med Name: OMEPRAZOLE DR CAPS 40MG] 90 capsule 4     Sig: TAKE 1 CAPSULE DAILY   Last Written Prescription Date:  7/8/19  Last Fill Quantity: 90 cap,  # refills: 3   Last office visit: 7/8/2019 with prescribing provider:  Cydney Garcia     Future Office Visit:   Next 5 appointments (look out 90 days)    Oct 29, 2019  3:30 PM CDT  Return Visit with Sunita Baird MD  [facility] ([facility]) [facility]  [address]  [place] [address]  [phone]             PPI Protocol Passed - 10/8/2019  7:20 PM        Passed - Not on Clopidogrel (unless Pantoprazole ordered)        Passed - No diagnosis of osteoporosis on record        Passed - Recent (12 mo) or future (30 days) visit within the authorizing provider's specialty     Patient has had an office visit with the authorizing provider or a provider within the authorizing providers department within the previous 12 mos or has a future within next 30 days. See \"Patient Info\" tab in inbasket, or \"Choose Columns\" in Meds & Orders section of the refill encounter.              Passed - Medication is active on med list        Passed - Patient is age 18 or older        Passed - No active pregnacy on record        Passed - No positive pregnancy test in past 12 months          "

## 2019-10-10 RX ORDER — OMEPRAZOLE 40 MG/1
CAPSULE, DELAYED RELEASE ORAL
Qty: 90 CAPSULE | Refills: 4 | OUTPATIENT
Start: 2019-10-10

## 2019-10-15 ENCOUNTER — IMMUNIZATION (OUTPATIENT)
Dept: FAMILY MEDICINE | Facility: CLINIC | Age: 54
End: 2019-10-15
Payer: COMMERCIAL

## 2019-10-15 DIAGNOSIS — Z12.39 SCREENING BREAST EXAMINATION: ICD-10-CM

## 2019-10-15 DIAGNOSIS — Z23 NEED FOR PROPHYLACTIC VACCINATION AND INOCULATION AGAINST INFLUENZA: Primary | ICD-10-CM

## 2019-10-15 LAB
ALBUMIN SERPL-MCNC: 3.6 G/DL (ref 3.4–5)
ALP SERPL-CCNC: 136 U/L (ref 40–150)
ALT SERPL W P-5'-P-CCNC: 41 U/L (ref 0–50)
ANION GAP SERPL CALCULATED.3IONS-SCNC: 6 MMOL/L (ref 3–14)
AST SERPL W P-5'-P-CCNC: 23 U/L (ref 0–45)
BILIRUB SERPL-MCNC: 0.4 MG/DL (ref 0.2–1.3)
BUN SERPL-MCNC: 10 MG/DL (ref 7–30)
CALCIUM SERPL-MCNC: 10 MG/DL (ref 8.5–10.1)
CHLORIDE SERPL-SCNC: 107 MMOL/L (ref 94–109)
CO2 SERPL-SCNC: 27 MMOL/L (ref 20–32)
CREAT SERPL-MCNC: 0.73 MG/DL (ref 0.52–1.04)
GFR SERPL CREATININE-BSD FRML MDRD: >90 ML/MIN/{1.73_M2}
GLUCOSE SERPL-MCNC: 77 MG/DL (ref 70–99)
POTASSIUM SERPL-SCNC: 4 MMOL/L (ref 3.4–5.3)
PROT SERPL-MCNC: 7.8 G/DL (ref 6.8–8.8)
SODIUM SERPL-SCNC: 140 MMOL/L (ref 133–144)

## 2019-10-15 PROCEDURE — 80053 COMPREHEN METABOLIC PANEL: CPT | Performed by: INTERNAL MEDICINE

## 2019-10-15 PROCEDURE — 36415 COLL VENOUS BLD VENIPUNCTURE: CPT | Performed by: INTERNAL MEDICINE

## 2019-10-15 PROCEDURE — 90682 RIV4 VACC RECOMBINANT DNA IM: CPT

## 2019-10-15 PROCEDURE — 90471 IMMUNIZATION ADMIN: CPT

## 2019-10-15 PROCEDURE — 99207 ZZC NO CHARGE NURSE ONLY: CPT

## 2019-10-15 PROCEDURE — 86300 IMMUNOASSAY TUMOR CA 15-3: CPT | Performed by: INTERNAL MEDICINE

## 2019-10-16 LAB — CANCER AG27-29 SERPL-ACNC: 14 U/ML (ref 0–39)

## 2019-10-17 ENCOUNTER — MYC REFILL (OUTPATIENT)
Dept: FAMILY MEDICINE | Facility: CLINIC | Age: 54
End: 2019-10-17

## 2019-10-17 DIAGNOSIS — K21.9 GASTROESOPHAGEAL REFLUX DISEASE WITHOUT ESOPHAGITIS: ICD-10-CM

## 2019-10-17 RX ORDER — OMEPRAZOLE 40 MG/1
40 CAPSULE, DELAYED RELEASE ORAL DAILY
Qty: 90 CAPSULE | Refills: 3 | Status: CANCELLED | OUTPATIENT
Start: 2019-10-17

## 2019-10-29 ENCOUNTER — ONCOLOGY VISIT (OUTPATIENT)
Dept: ONCOLOGY | Facility: CLINIC | Age: 54
End: 2019-10-29
Attending: INTERNAL MEDICINE
Payer: COMMERCIAL

## 2019-10-29 VITALS
WEIGHT: 250.3 LBS | OXYGEN SATURATION: 95 % | SYSTOLIC BLOOD PRESSURE: 154 MMHG | HEART RATE: 80 BPM | BODY MASS INDEX: 39.28 KG/M2 | DIASTOLIC BLOOD PRESSURE: 69 MMHG | TEMPERATURE: 96.7 F | RESPIRATION RATE: 20 BRPM | HEIGHT: 67 IN

## 2019-10-29 DIAGNOSIS — L65.9 HAIR THINNING: ICD-10-CM

## 2019-10-29 DIAGNOSIS — Z85.3 HISTORY OF LEFT BREAST CANCER: Primary | ICD-10-CM

## 2019-10-29 DIAGNOSIS — R63.5 WEIGHT GAIN: ICD-10-CM

## 2019-10-29 DIAGNOSIS — Z12.31 SCREENING MAMMOGRAM, ENCOUNTER FOR: ICD-10-CM

## 2019-10-29 PROCEDURE — 99214 OFFICE O/P EST MOD 30 MIN: CPT | Performed by: INTERNAL MEDICINE

## 2019-10-29 PROCEDURE — G0463 HOSPITAL OUTPT CLINIC VISIT: HCPCS

## 2019-10-29 RX ORDER — EXEMESTANE 25 MG/1
25 TABLET ORAL DAILY
Qty: 90 TABLET | Refills: 3 | Status: SHIPPED | OUTPATIENT
Start: 2019-10-29 | End: 2022-03-30

## 2019-10-29 ASSESSMENT — PAIN SCALES - GENERAL: PAINLEVEL: NO PAIN (0)

## 2019-10-29 ASSESSMENT — MIFFLIN-ST. JEOR: SCORE: 1755.04

## 2019-10-29 NOTE — PATIENT INSTRUCTIONS
Dr. Baird would like to see you back in 1 year with labs prior.  Mammogram due 7/2020    When you are in need of a refill, please call your pharmacy and they will send us a request.      Copy of appointments, and after visit summary (AVS) given to patient.      If you have any questions during business hours (M-F 8 AM- 4PM), please call Brianna Nam RN Oncology Hematology  at [facility] [phone].       For questions after business hours, or on holidays/weekends, please call our after hours Nurse Triage line [phone]. Thank you.          1 yr f/u with labs. Due MA in 7/2019.

## 2019-10-29 NOTE — LETTER
10/29/2019         RE: Ann Vargas  [id number]  [place] [address]        Dear Colleague,    Thank you for referring your patient, Ann Vargas, to the [facility]. Please see a copy of my visit note below.    Primary PHYSICIAN:  Cydney Garcia MD        CHIEF COMPLAINT AND REASON FOR Visit:   left breast cancer T1bN0 s/p lumpectomy 11/2014, RT 1/2015.        HISTORY OF ONCOLOGY ILLNESS:  Ann Vargas is a 48-year-old peromenopausal woman had her screening mammogram 09/2014,  found to have new microcalcification in the left upper outer quadrant of the breast around 1 o'clock position 8 cm from the nipple.  Stereotactic biopsy on the left upper outer quadrant of the breast microcalcifications sites, one is negative.  The other one turned out to be invasive ductal cancer, grade 1, negative lymphovascular invasion, no DCIS, ER/% positive, HER-2/seth is negative.  She  did not feel breast mass, no skin changes.  She had post-biopsy hematoma.  Left lumpectomy 11/19/2014 found 5 mm IDC, Grade I, 2 sLN negative, clear margin. She has pT1bN0 disease.    She finished RT 1/2015.   Tamoxifen was advised after. LMP 1/2015. It was changed to Arimidex 4/2016. She has severe muscle pain from it and d/c in 4/2018. It was changed to Aromasin.       INTERVAL HISTORY:  She has hair loss from Aromasin.          OTHER MEDICAL PROBLEMS:  Obesity, hyperlipidemia, GERD.   OCP for yrs d/c 5/2014 found to be post menopausal by blood work in 6/2014      MEDICATIONS:  Reviewed in Epic system.      SOCIAL HISTORY:  She lives in [place].  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  Denies smoking or alcohol abuse.      FAMILY HISTORY:  One great-grandmother had breast cancer, she was a survivor.  One grandmother had disease from ovarian cancer.        REVIEW OF SYSTEMS:   She is gaining weight.   She reported muscle ache and joints from AI. This is better on Aromasin.  She has hair loss from Aromasin,  "with pruritus on her scalp.         PHYSICAL EXAMINATION:   VITAL SIGNS: Blood pressure (!) 154/69, pulse 80, temperature 96.7  F (35.9  C), temperature source Tympanic, resp. rate 20, height 1.689 m (5' 6.5\"), weight 113.5 kg (250 lb 4.8 oz), last menstrual period 02/17/2015, SpO2 95 %, not currently breastfeeding.  ECOG 0    GENERAL APPEARANCE:  Middle aged woman looks like stated age.  She is a little bit anxious.  She is morbidly obese.  Not in acute distress.   HEENT: The patient is normocephalic, atraumatic. Pupils are equally reactive to light.  Sclerae are anicteric.  Moist oral mucosa.  Negative pharynx.  No oral thrush. Left iritis just saw \"total eye\".  NECK:  Supple.  No jugular venous distention.  Thyroid is not palpable.   LYMPH NODES:  Superficial lymphadenopathy is not appreciable in the bilateral cervical, supraclavicular, axillary or inguinal areas.   CARDIOVASCULAR:  S1, S2 regular with no murmurs or gallops.  No carotid or abdominal bruits.   PULMONARY:  Lungs are clear to auscultation and percussion bilaterally.  There is no wheezing or rhonchi.   GASTROINTESTINAL:  Abdomen is soft, nontender.  No hepatosplenomegaly.  No signs of ascites.  No mass appreciable.   MUSCULOSKELETAL/EXTREMITIES:  No edema.  No cyanotic changes.  No signs of joint deformity.  No lymphedema.   NEUROLOGIC:  Cranial nerves II-XII are grossly intact.  Sensation intact.  Muscle strength and muscle tone symmetrical, 5/5 throughout.   BACK:  No spinal or paraspinal tenderness.  No CVA tenderness.   SKIN:  No petechiae.  No rash.  No signs of cellulitis.   BREASTS:  Bilateral breast exam review.  Bilateral oversized symmetrical breasts.  Right side is clear.  Left side has well healed lumpectomy and sentinel LN scar.        CURRENT DATA REVIEWED  CMP/marker are good.      Current Image Reviewed  MA 7/2019 - negative      Old data reviewed with [employer]  Dexa 11/2017 - nl   Left lumpectomy 11/19/2014 found 5 mm IDC, Grade I, 2 " sLN negative, clear margin. She has pT1bN0 disease.    Biopsy 10/29/2014- tereotactic biopsy on the left upper outer quadrant of the breast microcalcifications sites, one is negative.  The other one turned out to be invasive ductal cancer, grade 1, negative lymphovascular invasion, no DCIS, ER/% positive, HER-2/seth is negative.    Dexa 12/2014: nl gone desity  Bone scan 12/2014: no mets         ASSESSMENT AND PLAN:    1.  left infiltrating ductal cancer, grade 1, ER/% positive, HER 2 negative, negative lymphovascular invasion, no DCIS, diagnosed through screening mammogram.  S/p lumpectomy 11/2014. She finished RT 1/2015.      Tamoxifen was advised after. LMP 1/2015. It was changed to Arimidex 4/2016.   She had severe muscle pain from it and d/c in 4/2018.   It was changed to Aromasin after her symptoms resolved.     We discussed the longer anti hormone data. She is open either way.   She can hold Aromasin to see if her thinning of hair and pruritis on her scalp improve.  If so,  based on her low risk features of her breast cancer I think 5 years of AI is reasonable.  If her symptoms does not change she can finished up 5 years of AI in 2020.  She is due 12 months f/u with labs and MA.        2. Weight gain.   Weight loss counseling is provided.   She is informed post menopausal obesity is a risk factor for breast cancer.   Weight loss will her joints symptoms too.     3.  Thinning of hair from AI.  Advised her to try oral Biotene and Rogaine shampoo           Again, thank you for allowing me to participate in the care of your patient.        Sincerely,        Sunita Baird MD, MD

## 2019-10-29 NOTE — NURSING NOTE
"Oncology Rooming Note    October 29, 2019 3:39 PM   Ann Vargas is a 53 year old female who presents for:    Chief Complaint   Patient presents with     Oncology Clinic Visit     6 month recheck Personal history of malignant neoplasm of breast, review Labs & Mammogram     Initial Vitals: BP (!) 154/69 (BP Location: Right arm, Patient Position: Sitting, Cuff Size: Adult Large)   Pulse 80   Temp 96.7  F (35.9  C) (Tympanic)   Resp 20   Ht 1.689 m (5' 6.5\")   Wt 113.5 kg (250 lb 4.8 oz)   LMP 02/17/2015   SpO2 95%   BMI 39.79 kg/m   Estimated body mass index is 39.79 kg/m  as calculated from the following:    Height as of this encounter: 1.689 m (5' 6.5\").    Weight as of this encounter: 113.5 kg (250 lb 4.8 oz). Body surface area is 2.31 meters squared.  No Pain (0) Comment: Data Unavailable   Patient's last menstrual period was 02/17/2015.  Allergies reviewed: Yes  Medications reviewed: Yes    Medications: Medication refills not needed today.  Pharmacy name entered into EPIC:    [facility] [place] [facility] [address] [facility] [place], [place] - [address] [place] [id number] [place], [place] - [address] [place] [address]    Clinical concerns: 6 month recheck Personal history of malignant neoplasm of breast, review Labs & Mammogram    Patient is wondering about side effects of one of her medications.          Jessica Veloz LPN              "

## 2019-11-07 ENCOUNTER — HEALTH MAINTENANCE LETTER (OUTPATIENT)
Age: 54
End: 2019-11-07

## 2020-02-17 ENCOUNTER — MYC MEDICAL ADVICE (OUTPATIENT)
Dept: FAMILY MEDICINE | Facility: CLINIC | Age: 55
End: 2020-02-17

## 2020-02-17 NOTE — TELEPHONE ENCOUNTER
Panel Management Review      Patient has the following on her problem list: None      Composite cancer screening  Chart review shows that this patient is due/due soon for the following Fecal Colorectal (FIT)  Summary:    Patient is due/failing the following:   FIT    Action needed:   Patient needs referral/order: fit    Type of outreach:    Sent [employer] message.    Questions for provider review:    None                                                                                                                                    Hilary Freeman MA       Chart routed to Care Team .

## 2020-07-21 ENCOUNTER — TRANSFERRED RECORDS (OUTPATIENT)
Dept: HEALTH INFORMATION MANAGEMENT | Facility: CLINIC | Age: 55
End: 2020-07-21

## 2020-07-30 DIAGNOSIS — Z11.59 ENCOUNTER FOR SCREENING FOR OTHER VIRAL DISEASES: Primary | ICD-10-CM

## 2020-08-10 ENCOUNTER — TELEPHONE (OUTPATIENT)
Dept: FAMILY MEDICINE | Facility: CLINIC | Age: 55
End: 2020-08-10

## 2020-08-10 DIAGNOSIS — K21.9 GASTROESOPHAGEAL REFLUX DISEASE WITHOUT ESOPHAGITIS: ICD-10-CM

## 2020-08-10 DIAGNOSIS — E78.5 HYPERLIPIDEMIA LDL GOAL <130: Primary | ICD-10-CM

## 2020-08-10 RX ORDER — OMEPRAZOLE 40 MG/1
40 CAPSULE, DELAYED RELEASE ORAL DAILY
Qty: 90 CAPSULE | Refills: 3 | Status: SHIPPED | OUTPATIENT
Start: 2020-08-10 | End: 2020-11-09

## 2020-08-10 NOTE — TELEPHONE ENCOUNTER
Reason for call:  Patient reporting a symptom    Symptom or request: Pt calling for 2 requests ;  1.  Pt states that she is allergic to Yellow dye #4 and the Omeprazole 40mg Rx that she got from [facility] has yellow dye in it.  Pt asking that new Rx for Omeprazole Sodaz without, Yellow dye #4, be resent to [facility].      2.  Pt wants Covid antibody testing done in [facility] when she has her upcoming pre-op PE lab work done     Please call patient and advise.      Duration (how long have symptoms been present): ongoing    Have you been treated for this before? Yes    Additional comments:     Phone Number patient can be reached at:  Cell number on file:    Telephone Information:   Mobile [phone]       Best Time:  any    Can we leave a detailed message on this number:  YES    Call taken on 8/10/2020 at 10:21 AM by Louise Lam

## 2020-08-10 NOTE — TELEPHONE ENCOUNTER
Lipid order placed.    COVID antibody testing can be done without an order through our outpatient labs.  Give patient that information please.  Otherwise can schedule a virtual visit with me to discuss ordering.     Has been >1 year since last visit, is due to be seen for virtual visit or F2F visit.    Cydney Garcia M.D.

## 2020-08-10 NOTE — TELEPHONE ENCOUNTER
The patient was notified and will just go through the outpatient lab.      Thank you    Karla HERRERA RN

## 2020-08-10 NOTE — TELEPHONE ENCOUNTER
Spoke with the patient. The last RX that was sent to the patient contained yellow dye.  The patient believes this is what sent her to the ER a few times. The patient would like a new RX stating not to contain yellow dye. The pharmacy reports there is an manufacture that does not use yellow dye.  Needs new RX.  Rx pended.    The patient is requesting Covid antibody testing. The patient reports PCP will want other labs that are due.  She would like to due the Covid antibody and other labs done at the same time.  Patient was advised she is due for appt and the antibody testing does not need appt. The patient would like to have provider order antibody testing and other testing ordered.    Labs pending.     Thank you    Karla HERRERA RN

## 2020-08-11 ENCOUNTER — ANESTHESIA EVENT (OUTPATIENT)
Dept: SURGERY | Facility: CLINIC | Age: 55
End: 2020-08-11
Payer: COMMERCIAL

## 2020-08-11 NOTE — ANESTHESIA PREPROCEDURE EVALUATION
Anesthesia Pre-Procedure Evaluation    Patient: Ann Vargas   MRN: [id number] : 1965          Preoperative Diagnosis: Cataract [H26.9]    Procedure(s):  Cataract Removal with Implant    Past Medical History:   Diagnosis Date     Chronic rhinitis 10/23/2006    10/23/2006 Had been on shots--now on singulair  Working well     Diagnostic skin and sensitization tests (aka ALLERGENS)     3/11/15 IgE tests NEGATIVE for orange, tomato, strawberry, apple--cherry, g. pepper and mushrrom are pending.     GERD (gastroesophageal reflux disease)      Hyperlipidemia      Unexplained endometrial cells on cervical cytology 12/17/15    2/15/16 emb scheduled.      Past Surgical History:   Procedure Laterality Date     C/SECTION, CLASSICAL  Aug 93 &     , Classical x 2     EYE SURGERY  ?    detachted retina surgery     LUMPECTOMY BREAST WITH SEED LOCALIZATION Left 2014    Procedure: LUMPECTOMY BREAST WITH SEED LOCALIZATION;  Surgeon: Shonna Leung MD;  Location: [facility]     LUMPECTOMY BREAST WITH SENTINEL NODE, COMBINED Left 2014    Procedure: COMBINED LUMPECTOMY BREAST WITH SENTINEL NODE;  Surgeon: Shonna Leung MD;  Location: [facility]     SURGICAL HISTORY OF -       detached retina       Anesthesia Evaluation     . Pt has had prior anesthetic. Type: General, MAC and Regional    History of anesthetic complications   - PONV        ROS/MED HX    ENT/Pulmonary:     (+)allergic rhinitis, , . .    Neurologic:  - neg neurologic ROS     Cardiovascular:     (+) Dyslipidemia, ----. : . . . :. .       METS/Exercise Tolerance:     Hematologic:  - neg hematologic  ROS       Musculoskeletal:  - neg musculoskeletal ROS       GI/Hepatic:     (+) GERD Asymptomatic on medication,       Renal/Genitourinary:  - ROS Renal section negative       Endo:     (+) Obesity, .      Psychiatric:  - neg psychiatric ROS       Infectious Disease:  - neg infectious disease ROS       Malignancy:  "  (+) Malignancy (left) History of Breast  Breast CA status post Surgery.         Other: Comment: Hx retinal detachment                         Physical Exam  Normal systems: cardiovascular, pulmonary and dental    Airway   Mallampati: II  TM distance: >3 FB  Neck ROM: full    Dental     Cardiovascular       Pulmonary             Lab Results   Component Value Date    WBC 9.8 02/13/2015    HGB 12.2 02/13/2015    HCT 39.1 02/13/2015     02/13/2015    CRP 7.6 03/11/2015    SED 35 (H) 03/11/2015     10/15/2019    POTASSIUM 4.0 10/15/2019    CHLORIDE 107 10/15/2019    CO2 27 10/15/2019    BUN 10 10/15/2019    CR 0.73 10/15/2019    GLC 77 10/15/2019    ANGELLA 10.0 10/15/2019    ALBUMIN 3.6 10/15/2019    PROTTOTAL 7.8 10/15/2019    ALT 41 10/15/2019    AST 23 10/15/2019    ALKPHOS 136 10/15/2019    BILITOTAL 0.4 10/15/2019    LIPASE 44 12/15/2012    TSH 2.57 12/17/2015    T4 0.97 09/13/2010    HCG Negative 11/19/2014    HCGS Negative 12/15/2012       Preop Vitals  BP Readings from Last 3 Encounters:   10/29/19 (!) 154/69   07/08/19 124/84   04/23/19 135/79    Pulse Readings from Last 3 Encounters:   10/29/19 80   07/08/19 88   04/23/19 89      Resp Readings from Last 3 Encounters:   10/29/19 20   07/08/19 18   04/23/19 20    SpO2 Readings from Last 3 Encounters:   10/29/19 95%   07/08/19 96%   04/23/19 96%      Temp Readings from Last 1 Encounters:   10/29/19 35.9  C (96.7  F) (Tympanic)    Ht Readings from Last 1 Encounters:   10/29/19 1.689 m (5' 6.5\")      Wt Readings from Last 1 Encounters:   10/29/19 113.5 kg (250 lb 4.8 oz)    Estimated body mass index is 39.79 kg/m  as calculated from the following:    Height as of 10/29/19: 1.689 m (5' 6.5\").    Weight as of 10/29/19: 113.5 kg (250 lb 4.8 oz).       Anesthesia Plan      History & Physical Review  History and physical reviewed and following examination; no interval change.    ASA Status:  2 .    NPO Status:  > 6 hours    Plan for MAC Reason for MAC:  " Procedure to face, neck, head or breast           Postoperative Care      Consents  Anesthetic plan, risks, benefits and alternatives discussed with:  Patient..                 Yumiko Meyer APRN CRNA

## 2020-08-12 ENCOUNTER — OFFICE VISIT (OUTPATIENT)
Dept: DERMATOLOGY | Facility: CLINIC | Age: 55
End: 2020-08-12
Payer: COMMERCIAL

## 2020-08-12 VITALS — OXYGEN SATURATION: 98 % | DIASTOLIC BLOOD PRESSURE: 94 MMHG | SYSTOLIC BLOOD PRESSURE: 149 MMHG | HEART RATE: 83 BPM

## 2020-08-12 DIAGNOSIS — D23.9 DERMAL NEVUS: ICD-10-CM

## 2020-08-12 DIAGNOSIS — L82.1 SEBORRHEIC KERATOSIS: Primary | ICD-10-CM

## 2020-08-12 DIAGNOSIS — D18.01 ANGIOMA OF SKIN: ICD-10-CM

## 2020-08-12 DIAGNOSIS — D48.5 NEOPLASM OF UNCERTAIN BEHAVIOR OF SKIN: ICD-10-CM

## 2020-08-12 DIAGNOSIS — L81.4 LENTIGO: ICD-10-CM

## 2020-08-12 DIAGNOSIS — L21.9 SEBORRHEIC DERMATITIS: ICD-10-CM

## 2020-08-12 PROCEDURE — 99203 OFFICE O/P NEW LOW 30 MIN: CPT | Mod: 25 | Performed by: DERMATOLOGY

## 2020-08-12 PROCEDURE — 88305 TISSUE EXAM BY PATHOLOGIST: CPT | Mod: TC | Performed by: DERMATOLOGY

## 2020-08-12 PROCEDURE — 13131 CMPLX RPR F/C/C/M/N/AX/G/H/F: CPT | Performed by: DERMATOLOGY

## 2020-08-12 PROCEDURE — 11442 EXC FACE-MM B9+MARG 1.1-2 CM: CPT | Mod: 51 | Performed by: DERMATOLOGY

## 2020-08-12 NOTE — NURSING NOTE
"Initial BP (!) 149/94 (BP Location: Right arm, Patient Position: Sitting, Cuff Size: Adult Large)   Pulse 83   LMP 02/17/2015   SpO2 98%  Estimated body mass index is 39.79 kg/m  as calculated from the following:    Height as of 10/29/19: 1.689 m (5' 6.5\").    Weight as of 10/29/19: 113.5 kg (250 lb 4.8 oz). .      "

## 2020-08-12 NOTE — LETTER
2020         RE: Ann Vargas  [id number]  [facility] [id number]        Dear Colleague,    Thank you for referring your patient, Ann Vargas, to the [facility]. Please see a copy of my visit note below.    Ann Vargas is a 54 year old year old female patient here today for growing mole on orbit and spot on left forearm.   .  Patient states this has been present for a while on orbit.  Patient reports the following symptoms:  growing.  Patient reports the following previous treatments none.  These treatments did not work.  Patient reports the following modifying factors none.  Associated symptoms: none.  Patient has no other skin complaints today.  Remainder of the HPI, Meds, PMH, Allergies, FH, and SH was reviewed in chart.      Past Medical History:   Diagnosis Date     Chronic rhinitis 10/23/2006    10/23/2006 Had been on shots--now on singulair  Working well     Diagnostic skin and sensitization tests (aka ALLERGENS)     3/11/15 IgE tests NEGATIVE for orange, tomato, strawberry, apple--cherry, g. pepper and mushrrom are pending.     GERD (gastroesophageal reflux disease)      Hyperlipidemia      Unexplained endometrial cells on cervical cytology 12/17/15    2/15/16 emb scheduled.        Past Surgical History:   Procedure Laterality Date     C/SECTION, CLASSICAL  Aug 93 &     , Classical x 2     EYE SURGERY  ?    detachted retina surgery     LUMPECTOMY BREAST WITH SEED LOCALIZATION Left 2014    Procedure: LUMPECTOMY BREAST WITH SEED LOCALIZATION;  Surgeon: Shonna Leung MD;  Location: [facility]     LUMPECTOMY BREAST WITH SENTINEL NODE, COMBINED Left 2014    Procedure: COMBINED LUMPECTOMY BREAST WITH SENTINEL NODE;  Surgeon: Shonna Leung MD;  Location: [facility]     SURGICAL HISTORY OF -       detached retina        Family History   Problem Relation Age of Onset     Hypertension Mother      Lipids Mother       Depression Mother         bipolar, also hysterectomy for fibroid     Genitourinary Problems Mother         Kidney  - on transplant list     Allergies Mother      Obesity Mother      Cancer Mother         skin cancer     Lipids Brother      Allergies Father      Obesity Father      Cancer Father         skin cancer     Cancer Maternal Grandmother         ovarian     Cancer Maternal Grandfather         lung       Social History     Socioeconomic History     Marital status:      Spouse name: Not on file     Number of children: Not on file     Years of education: Not on file     Highest education level: Not on file   Occupational History     Not on file   Social Needs     Financial resource strain: Not on file     Food insecurity     Worry: Not on file     Inability: Not on file     Transportation needs     Medical: Not on file     Non-medical: Not on file   Tobacco Use     Smoking status: Never Smoker     Smokeless tobacco: Never Used   Substance and Sexual Activity     Alcohol use: Yes     Alcohol/week: 0.0 standard drinks     Comment: -monthly     Drug use: No     Sexual activity: Yes     Partners: Male     Birth control/protection: Pill   Lifestyle     Physical activity     Days per week: Not on file     Minutes per session: Not on file     Stress: Not on file   Relationships     Social connections     Talks on phone: Not on file     Gets together: Not on file     Attends Denominational service: Not on file     Active member of club or organization: Not on file     Attends meetings of clubs or organizations: Not on file     Relationship status: Not on file     Intimate partner violence     Fear of current or ex partner: Not on file     Emotionally abused: Not on file     Physically abused: Not on file     Forced sexual activity: Not on file   Other Topics Concern     Parent/sibling w/ CABG, MI or angioplasty before 65F 55M? No   Social History Narrative     Not on file       Outpatient Encounter Medications as of  8/12/2020   Medication Sig Dispense Refill     montelukast (SINGULAIR) 10 MG tablet TAKE 1 TABLET AT BEDTIME 90 tablet 0     omeprazole (PRILOSEC) 40 MG DR capsule Take 1 capsule (40 mg) by mouth daily Allergy to yellow dye- can not contain yellow dye 90 capsule 3     simvastatin (ZOCOR) 40 MG tablet Take 1 tablet (40 mg) by mouth At Bedtime Appt DUE August 2020 90 tablet 0     ASPIRIN PO Take 650 mg by mouth as needed for moderate pain        DiphenhydrAMINE HCl (BENADRYL PO) Take 25 mg by mouth every 4 hours as needed for itching (Hives)       EPINEPHrine (ADRENACLICK) 0.3 MG/0.3ML injection 2-pack Inject 0.3 mLs (0.3 mg) into the muscle as needed for anaphylaxis (Patient not taking: Reported on 10/29/2019) 0.6 mL 1     exemestane (AROMASIN) 25 MG tablet Take 1 tablet (25 mg) by mouth daily Take after a meal. (Patient not taking: Reported on 8/12/2020) 90 tablet 3     exemestane (AROMASIN) 25 MG tablet Take 1 tablet (25 mg) by mouth daily (Patient not taking: Reported on 8/12/2020) 90 tablet 3     loteprednol (LOTEMAX) 0.5 % ophthalmic suspension Place 1 drop into both eyes daily as needed.       timolol (TIMOPTIC) 0.5 % ophthalmic solution        No facility-administered encounter medications on file as of 8/12/2020.              Review Of Systems  Skin: As above  Eyes: negative  Ears/Nose/Throat: negative  Respiratory: No shortness of breath, dyspnea on exertion, cough, or hemoptysis  Cardiovascular: negative  Gastrointestinal: negative  Genitourinary: negative  Musculoskeletal: negative  Neurologic: negative  Psychiatric: negative  Hematologic/Lymphatic/Immunologic: negative  Endocrine: negative      O:   NAD, WDWN, Alert & Oriented, Mood & Affect wnl, Vitals stable   Here today alone   LMP 02/17/2015    General appearance normal   Vitals stable   Alert, oriented and in no acute distress      Following lymph nodes palpated: Occipital, Cervical, Supraclavicular nolad   L forearm stuck on appule   Flesh colored  papule on face  L lat orbit 1.1cm brown papule     Stuck on papules and brown macules on trunk and ext   Red papules on trunk  Flesh colored papules on trunk     The remainder of the full exam was normal; the following areas were examined:  conjunctiva/lids, oral mucosa, neck, peripheral vascular system, abdomen, lymph nodes, digits/nails, eccrine and apocrine glands, scalp/hair, face, neck, chest, abdomen, buttocks, back, RUE, LUE, RLE, LLE       Eyes: Conjunctivae/lids:Normal     ENT: Lips, buccal mucosa, tongue: normal    MSK:Normal    Cardiovascular: peripheral edema none    Pulm: Breathing Normal    Lymph Nodes: No Head and Neck Lymphadenopathy     Neuro/Psych: Orientation:Alert and Orientedx3 ; Mood/Affect:normal       A/P:  1. Seborrheic keratosis, lentigo, angioma, dermal nevus  2. l lat orbit growing nevus  EXCISION OF BENIGN LESION AND COMPLEX: After thorough discussion of PGACAC, consent obtained, anesthesia and prep, the margins of the lesion were identified and an elliptical incision was made encompassing the lesion. The incisions were made through the skin and down to and including the subcutaneous tissue. The lesion was removed en bloc and submitted for perm  pathologic review. The wound edges were widely undermined until adequate tissue mobility was obtained. hemostasis was achieved. The wound edges were then closed in a layered fashion, being careful not to leave any dead space. Postoperative length was 2 cm.   EBL minimal; complications none; wound care routine. The patient was discharged in good condition and will return in one week for wound evaluation.    BENIGN LESIONS DISCUSSED WITH PATIENT:  I discussed the specifics of tumor, prognosis, and genetics of benign lesions.  I explained that treatment of these lesions would be purely cosmetic and not medically neccessary.  I discussed with patient different removal options including excision, cautery and /or laser.      Nature and genetics of  benign skin lesions dicussed with patient.  Signs and Symptoms of skin cancer discussed with patient.  Patient encouraged to perform monthly skin exams.  UV precautions reviewed with patient.  Skin care regimen reviewed with patient: Eliminate harsh soaps, i.e. Dial, zest, irsih spring; Mild soaps such as Cetaphil or Dove sensitive skin, avoid hot or cold showers, aggressive use of emollients including vanicream, cetaphil or cerave discussed with patient.    Risks of non-melanoma skin cancer discussed with patient   Return to clinic pending path       Again, thank you for allowing me to participate in the care of your patient.        Sincerely,        Ricki Triana MD

## 2020-08-12 NOTE — PROGRESS NOTES
Ann Vargas is a 54 year old year old female patient here today for growing mole on orbit and spot on left forearm.   .  Patient states this has been present for a while on orbit.  Patient reports the following symptoms:  growing.  Patient reports the following previous treatments none.  These treatments did not work.  Patient reports the following modifying factors none.  Associated symptoms: none.  Patient has no other skin complaints today.  Remainder of the HPI, Meds, PMH, Allergies, FH, and SH was reviewed in chart.  She alos complains of scalp itching.     Past Medical History:   Diagnosis Date     Chronic rhinitis 10/23/2006    10/23/2006 Had been on shots--now on singulair  Working well     Diagnostic skin and sensitization tests (aka ALLERGENS)     3/11/15 IgE tests NEGATIVE for orange, tomato, strawberry, apple--cherry, g. pepper and mushrrom are pending.     GERD (gastroesophageal reflux disease)      Hyperlipidemia      Unexplained endometrial cells on cervical cytology 12/17/15    2/15/16 emb scheduled.        Past Surgical History:   Procedure Laterality Date     C/SECTION, CLASSICAL  Aug 93 &     , Classical x 2     EYE SURGERY  ?    detachted retina surgery     LUMPECTOMY BREAST WITH SEED LOCALIZATION Left 2014    Procedure: LUMPECTOMY BREAST WITH SEED LOCALIZATION;  Surgeon: Shonna Leung MD;  Location: [facility]     LUMPECTOMY BREAST WITH SENTINEL NODE, COMBINED Left 2014    Procedure: COMBINED LUMPECTOMY BREAST WITH SENTINEL NODE;  Surgeon: Shonna Leung MD;  Location: [facility]     SURGICAL HISTORY OF -       detached retina        Family History   Problem Relation Age of Onset     Hypertension Mother      Lipids Mother      Depression Mother         bipolar, also hysterectomy for fibroid     Genitourinary Problems Mother         Kidney  - on transplant list     Allergies Mother      Obesity Mother      Cancer Mother         skin cancer      Lipids Brother      Allergies Father      Obesity Father      Cancer Father         skin cancer     Cancer Maternal Grandmother         ovarian     Cancer Maternal Grandfather         lung       Social History     Socioeconomic History     Marital status:      Spouse name: Not on file     Number of children: Not on file     Years of education: Not on file     Highest education level: Not on file   Occupational History     Not on file   Social Needs     Financial resource strain: Not on file     Food insecurity     Worry: Not on file     Inability: Not on file     Transportation needs     Medical: Not on file     Non-medical: Not on file   Tobacco Use     Smoking status: Never Smoker     Smokeless tobacco: Never Used   Substance and Sexual Activity     Alcohol use: Yes     Alcohol/week: 0.0 standard drinks     Comment: -monthly     Drug use: No     Sexual activity: Yes     Partners: Male     Birth control/protection: Pill   Lifestyle     Physical activity     Days per week: Not on file     Minutes per session: Not on file     Stress: Not on file   Relationships     Social connections     Talks on phone: Not on file     Gets together: Not on file     Attends Jew service: Not on file     Active member of club or organization: Not on file     Attends meetings of clubs or organizations: Not on file     Relationship status: Not on file     Intimate partner violence     Fear of current or ex partner: Not on file     Emotionally abused: Not on file     Physically abused: Not on file     Forced sexual activity: Not on file   Other Topics Concern     Parent/sibling w/ CABG, MI or angioplasty before 65F 55M? No   Social History Narrative     Not on file       Outpatient Encounter Medications as of 8/12/2020   Medication Sig Dispense Refill     montelukast (SINGULAIR) 10 MG tablet TAKE 1 TABLET AT BEDTIME 90 tablet 0     omeprazole (PRILOSEC) 40 MG DR capsule Take 1 capsule (40 mg) by mouth daily Allergy to  yellow dye- can not contain yellow dye 90 capsule 3     simvastatin (ZOCOR) 40 MG tablet Take 1 tablet (40 mg) by mouth At Bedtime Appt DUE August 2020 90 tablet 0     ASPIRIN PO Take 650 mg by mouth as needed for moderate pain        DiphenhydrAMINE HCl (BENADRYL PO) Take 25 mg by mouth every 4 hours as needed for itching (Hives)       EPINEPHrine (ADRENACLICK) 0.3 MG/0.3ML injection 2-pack Inject 0.3 mLs (0.3 mg) into the muscle as needed for anaphylaxis (Patient not taking: Reported on 10/29/2019) 0.6 mL 1     exemestane (AROMASIN) 25 MG tablet Take 1 tablet (25 mg) by mouth daily Take after a meal. (Patient not taking: Reported on 8/12/2020) 90 tablet 3     exemestane (AROMASIN) 25 MG tablet Take 1 tablet (25 mg) by mouth daily (Patient not taking: Reported on 8/12/2020) 90 tablet 3     loteprednol (LOTEMAX) 0.5 % ophthalmic suspension Place 1 drop into both eyes daily as needed.       timolol (TIMOPTIC) 0.5 % ophthalmic solution        No facility-administered encounter medications on file as of 8/12/2020.              Review Of Systems  Skin: As above  Eyes: negative  Ears/Nose/Throat: negative  Respiratory: No shortness of breath, dyspnea on exertion, cough, or hemoptysis  Cardiovascular: negative  Gastrointestinal: negative  Genitourinary: negative  Musculoskeletal: negative  Neurologic: negative  Psychiatric: negative  Hematologic/Lymphatic/Immunologic: negative  Endocrine: negative      O:   NAD, WDWN, Alert & Oriented, Mood & Affect wnl, Vitals stable   Here today alone   LMP 02/17/2015    General appearance normal   Vitals stable   Alert, oriented and in no acute distress      Following lymph nodes palpated: Occipital, Cervical, Supraclavicular nolad   L forearm stuck on appule   Flesh colored papule on face  L lat orbit 1.1cm brown papule     Stuck on papules and brown macules on trunk and ext   Red papules on trunk  Flesh colored papules on trunk   Faint scaly patches with scale in scalp   The  remainder of the full exam was normal; the following areas were examined:  conjunctiva/lids, oral mucosa, neck, peripheral vascular system, abdomen, lymph nodes, digits/nails, eccrine and apocrine glands, scalp/hair, face, neck, chest, abdomen, buttocks, back, RUE, LUE, RLE, LLE       Eyes: Conjunctivae/lids:Normal     ENT: Lips, buccal mucosa, tongue: normal    MSK:Normal    Cardiovascular: peripheral edema none    Pulm: Breathing Normal    Lymph Nodes: No Head and Neck Lymphadenopathy     Neuro/Psych: Orientation:Alert and Orientedx3 ; Mood/Affect:normal       A/P:  1. Seborrheic keratosis, lentigo, angioma, dermal nevus  2. l lat orbit growing nevus  EXCISION OF BENIGN LESION AND COMPLEX: After thorough discussion of PGACAC, consent obtained, anesthesia and prep, the margins of the lesion were identified and an elliptical incision was made encompassing the lesion. The incisions were made through the skin and down to and including the subcutaneous tissue. The lesion was removed en bloc and submitted for perm  pathologic review. The wound edges were widely undermined until adequate tissue mobility was obtained. hemostasis was achieved. The wound edges were then closed in a layered fashion, being careful not to leave any dead space. Postoperative length was 2 cm.   EBL minimal; complications none; wound care routine. The patient was discharged in good condition and will return in one week for wound evaluation.  3. seb derm  nizoral weekly  Lidex solution prn     BENIGN LESIONS DISCUSSED WITH PATIENT:  I discussed the specifics of tumor, prognosis, and genetics of benign lesions.  I explained that treatment of these lesions would be purely cosmetic and not medically neccessary.  I discussed with patient different removal options including excision, cautery and /or laser.      Nature and genetics of benign skin lesions dicussed with patient.  Signs and Symptoms of skin cancer discussed with patient.  Patient  encouraged to perform monthly skin exams.  UV precautions reviewed with patient.  Skin care regimen reviewed with patient: Eliminate harsh soaps, i.e. Dial, zest, irsih spring; Mild soaps such as Cetaphil or Dove sensitive skin, avoid hot or cold showers, aggressive use of emollients including vanicream, cetaphil or cerave discussed with patient.    Risks of non-melanoma skin cancer discussed with patient   Return to clinic pending path

## 2020-08-12 NOTE — PATIENT INSTRUCTIONS
Sutured Wound Care     [facility]: [phone]    [facility]: [phone]    Left cheek  ? No strenuous activity for 48 hours. Resume moderate activity in 48 hours. No heavy exercising until you are seen for follow up in one week.     ? Take Tylenol as needed for discomfort.                         ? Do not drink alcoholic beverages for 48 hours.     ? Keep the pressure bandage in place for 24 hours. If the bandage becomes blood tinged or loose, reinforce it with gauze and tape.        (Refer to the reverse side of this page for management of bleeding).    ? Remove pressure bandage in 24 hours (white)    ? Leave the flat bandage in place until your follow up appointment. (brown)    ? Keep the bandage dry. Wash around it carefully.    ? If the tape becomes soiled or starts to come off, reinforce it with additional paper tape.    ? Do not smoke for 3 weeks; smoking is detrimental to wound healing.    ? It is normal to have swelling and bruising around the surgical site. The bruising will fade in approximately 10-14 days. Elevate the area to reduce swelling.    ? Numbness, itchiness and sensitivity to temperature changes can occur after surgery and may take up to 18 months to normalize.    POSSIBLE COMPLICATIONS    BLEEDIN. Leave the bandage in place.  2. Use tightly rolled up gauze or a cloth to apply direct pressure over the bandage for 20   minutes.  3. Reapply pressure for an additional 20 minutes if necessary  4. Call the office or go to the nearest emergency room if pressure fails to stop the bleeding.  5. Use additional gauze and tape to maintain pressure once the bleeding has stopped.    PAIN:    1. Post operative pain should slowly get better, never worse.  2. A severe increase in pain may indicate a problem. Call the office if this occurs.    In case of emergency phone:Dr Triana [phone]

## 2020-08-13 DIAGNOSIS — Z11.59 ENCOUNTER FOR SCREENING FOR OTHER VIRAL DISEASES: ICD-10-CM

## 2020-08-13 PROCEDURE — U0003 INFECTIOUS AGENT DETECTION BY NUCLEIC ACID (DNA OR RNA); SEVERE ACUTE RESPIRATORY SYNDROME CORONAVIRUS 2 (SARS-COV-2) (CORONAVIRUS DISEASE [COVID-19]), AMPLIFIED PROBE TECHNIQUE, MAKING USE OF HIGH THROUGHPUT TECHNOLOGIES AS DESCRIBED BY CMS-2020-01-R: HCPCS | Performed by: OPHTHALMOLOGY

## 2020-08-13 NOTE — H&P
[facility]  TOTAL EYE CARE  [address] [facility] [address]  [phone]  Dept: [phone]    OPHTHALMOLOGY PRE-OPERATIVE  HISTORY AND PHYSICAL    DATE OF H/P:  2020    DATE OF SURGERY:  2020  PROCEDURE:  Procedure(s):  Cataract Removal with Implant, Right Eye  LENS IMPLANT:  [id number] +11.5  REFRACTIVE GOAL:  -1.50 Sph  SURGEON:  Ricki Luo MD    ANESTHESIA:  TOPICAL / MAC    OR CASE REQUIREMENTS:  Posterior synechiae - needs lysis, and Malyugin ring.    DEMOGRAPHICS:  Demographic Information on Ann Vargas:    Ann Vargas  Gender: female  : 1965  [address]  [facility] [address]  [phone] (home) [phone] (work)    Medical Record: [id number]  Social Security Number: [id number]  Pharmacy:    [employer] - A MAIL ORDER [employer] [facility] - [address] [place]ARMACY [place] - [facility] [place] [address] [place] [id number] [place] [facility] - [place], [place] - [address] [place] [address]  Primary Care Provider: Cydney Garcia    Parent's names are: Data Unavailable (mother) and Data Unavailable (father).    Insurance: Payor: BCBS / Plan: BCBS [facility] / Product Type: Indemnity /     OCULAR HISTORY:  Cataracts, each eye.  History of iritis, each eye.  H/o RD left eye, s/p SB left eye.    HISTORIES:  Past Medical History:   Diagnosis Date     Chronic rhinitis 10/23/2006    10/23/2006 Had been on shots--now on singulair  Working well     Complication of anesthesia      Diagnostic skin and sensitization tests (aka ALLERGENS)     3/11/15 IgE tests NEGATIVE for orange, tomato, strawberry, apple--cherry, g. pepper and mushrrom are pending.     GERD (gastroesophageal reflux disease)      Hyperlipidemia      PONV (postoperative nausea and vomiting)      Unexplained endometrial cells on cervical cytology 12/17/15    2/15/16 emb scheduled.        Past Surgical History:   Procedure Laterality Date     C/SECTION,  CLASSICAL  Aug 93 &     , Classical x 2     EYE SURGERY  ?    detachted retina surgery     LUMPECTOMY BREAST WITH SEED LOCALIZATION Left 2014    Procedure: LUMPECTOMY BREAST WITH SEED LOCALIZATION;  Surgeon: Shonna Leung MD;  Location: [facility]     LUMPECTOMY BREAST WITH SENTINEL NODE, COMBINED Left 2014    Procedure: COMBINED LUMPECTOMY BREAST WITH SENTINEL NODE;  Surgeon: Shonna Leung MD;  Location: [facility]     SURGICAL HISTORY OF -       detached retina       Family History   Problem Relation Age of Onset     Hypertension Mother      Lipids Mother      Depression Mother         bipolar, also hysterectomy for fibroid     Genitourinary Problems Mother         Kidney  - on transplant list     Allergies Mother      Obesity Mother      Cancer Mother         skin cancer     Lipids Brother      Allergies Father      Obesity Father      Cancer Father         skin cancer     Cancer Maternal Grandmother         ovarian     Cancer Maternal Grandfather         lung       Social History     Tobacco Use     Smoking status: Never Smoker     Smokeless tobacco: Never Used   Substance Use Topics     Alcohol use: Yes     Alcohol/week: 0.0 standard drinks     Comment: -monthly       MEDICATIONS:  No current facility-administered medications for this encounter.      Current Outpatient Medications   Medication Sig     ASPIRIN PO Take 650 mg by mouth as needed for moderate pain      DiphenhydrAMINE HCl (BENADRYL PO) Take 25 mg by mouth every 4 hours as needed for itching (Hives)     loteprednol (LOTEMAX) 0.5 % ophthalmic suspension Place 1 drop into both eyes daily as needed.     montelukast (SINGULAIR) 10 MG tablet TAKE 1 TABLET AT BEDTIME     omeprazole (PRILOSEC) 40 MG DR capsule Take 1 capsule (40 mg) by mouth daily Allergy to yellow dye- can not contain yellow dye     simvastatin (ZOCOR) 40 MG tablet Take 1 tablet (40 mg) by mouth At Bedtime Appt DUE 2020      timolol (TIMOPTIC) 0.5 % ophthalmic solution      EPINEPHrine (ADRENACLICK) 0.3 MG/0.3ML injection 2-pack Inject 0.3 mLs (0.3 mg) into the muscle as needed for anaphylaxis (Patient not taking: Reported on 10/29/2019)     exemestane (AROMASIN) 25 MG tablet Take 1 tablet (25 mg) by mouth daily Take after a meal. (Patient not taking: Reported on 8/12/2020)     exemestane (AROMASIN) 25 MG tablet Take 1 tablet (25 mg) by mouth daily (Patient not taking: Reported on 8/12/2020)       ALLERGIES:     Allergies   Allergen Reactions     Yellow Dye Anaphylaxis     Yellow dye #5/#6      Nka [No Known Allergies]        PERTINENT SYSTEMS REVIEW:    1. No - Do you have a history of heart attack, stroke, stent, bypass or surgery on an artery in the head, neck, heart or legs?  2. No - Do you ever have any pain or discomfort in your chest?  3. No - Do you have a history of  Heart Failure?  4. No - Are you troubled by shortness of breath when walking: On the level, up a slight hill or at night?  5. No - Do you currently have a cold, bronchitis or other respiratory infection?  6. No - Do you have a cough, shortness of breath or wheezing?  7. No - Do you sometimes get pains in the calves of your legs when you walk?  8. No - Do you or anyone in your family have previous history of blood clots?  9. No - Do you or does anyone in your family have a serious bleeding problem such as prolonged bleeding following surgeries or cuts?  10. No - Have you ever had problems with anemia or been told to take iron pills?  11. No - Have you had any abnormal blood loss such as black, tarry or bloody stools, or abnormal vaginal bleeding?  12. No - Have you ever had a blood transfusion?  13. Yes: nausea - Have you or any of your relatives ever had problems with anesthesia?  14. No - Do you have sleep apnea, excessive snoring or daytime drowsiness?  15. No - Do you have any prosthetic heart valves?  16. No - Do you have prosthetic  joints?    EXAMINATION:  Vitals were reviewed  Vison:  Va, right - 20/400, left - 20/260;  HEENT:  Cataract, otherwise unremarkable.  LUNGS:  Clear  CV:  Regular rate and rhythm without murmur  ABD:  Soft and nontender  NEURO:  Alert and nonfocal    IMPRESSION:  Patient cleared for ophthalmic surgery.  Low risk with monitored, light sedation.  I have assessed the patient's DVT risk, and no additional orders necessary.    PLAN:  Procedure(s):  Cataract Removal with Implant, Right Eye      Ricki Luo MD

## 2020-08-14 LAB
SARS-COV-2 RNA SPEC QL NAA+PROBE: NOT DETECTED
SPECIMEN SOURCE: NORMAL

## 2020-08-15 LAB — COPATH REPORT: NORMAL

## 2020-08-17 ENCOUNTER — HOSPITAL ENCOUNTER (OUTPATIENT)
Facility: CLINIC | Age: 55
Discharge: HOME | End: 2020-08-17
Attending: OPHTHALMOLOGY | Admitting: OPHTHALMOLOGY
Payer: COMMERCIAL

## 2020-08-17 ENCOUNTER — ANESTHESIA (OUTPATIENT)
Dept: SURGERY | Facility: CLINIC | Age: 55
End: 2020-08-17
Payer: COMMERCIAL

## 2020-08-17 VITALS
BODY MASS INDEX: 41.78 KG/M2 | SYSTOLIC BLOOD PRESSURE: 147 MMHG | TEMPERATURE: 98.1 F | OXYGEN SATURATION: 94 % | RESPIRATION RATE: 18 BRPM | DIASTOLIC BLOOD PRESSURE: 89 MMHG | HEIGHT: 66 IN | WEIGHT: 260 LBS

## 2020-08-17 PROCEDURE — 25000128 H RX IP 250 OP 636: Performed by: OPHTHALMOLOGY

## 2020-08-17 PROCEDURE — V2632 POST CHMBR INTRAOCULAR LENS: HCPCS | Performed by: OPHTHALMOLOGY

## 2020-08-17 PROCEDURE — 27210794 ZZH OR GENERAL SUPPLY STERILE: Performed by: OPHTHALMOLOGY

## 2020-08-17 PROCEDURE — 25000125 ZZHC RX 250: Performed by: NURSE ANESTHETIST, CERTIFIED REGISTERED

## 2020-08-17 PROCEDURE — 36000025 ZZH CATARACT SURGICAL PACKAGE: Performed by: OPHTHALMOLOGY

## 2020-08-17 PROCEDURE — 25000125 ZZHC RX 250: Performed by: OPHTHALMOLOGY

## 2020-08-17 PROCEDURE — 25000128 H RX IP 250 OP 636: Performed by: NURSE ANESTHETIST, CERTIFIED REGISTERED

## 2020-08-17 PROCEDURE — 37000012 ZZH ANESTHESIA CATARACT PACKAGE: Performed by: OPHTHALMOLOGY

## 2020-08-17 PROCEDURE — 71000022 ZZH RECOVERY CATRACT PACKAGE: Performed by: OPHTHALMOLOGY

## 2020-08-17 PROCEDURE — 25800030 ZZH RX IP 258 OP 636: Performed by: NURSE ANESTHETIST, CERTIFIED REGISTERED

## 2020-08-17 DEVICE — EYE IMP IOL AMO PCL TECNIS ZCB00 11.5: Type: IMPLANTABLE DEVICE | Site: EYE | Status: FUNCTIONAL

## 2020-08-17 RX ORDER — PROPARACAINE HYDROCHLORIDE 5 MG/ML
SOLUTION/ DROPS OPHTHALMIC PRN
Status: DISCONTINUED | OUTPATIENT
Start: 2020-08-17 | End: 2020-08-17 | Stop reason: HOSPADM

## 2020-08-17 RX ORDER — BALANCED SALT SOLUTION 6.4; .75; .48; .3; 3.9; 1.7 MG/ML; MG/ML; MG/ML; MG/ML; MG/ML; MG/ML
SOLUTION OPHTHALMIC PRN
Status: DISCONTINUED | OUTPATIENT
Start: 2020-08-17 | End: 2020-08-17 | Stop reason: HOSPADM

## 2020-08-17 RX ORDER — LIDOCAINE 40 MG/G
CREAM TOPICAL
Status: DISCONTINUED | OUTPATIENT
Start: 2020-08-17 | End: 2020-08-17 | Stop reason: HOSPADM

## 2020-08-17 RX ORDER — SODIUM CHLORIDE, SODIUM LACTATE, POTASSIUM CHLORIDE, CALCIUM CHLORIDE 600; 310; 30; 20 MG/100ML; MG/100ML; MG/100ML; MG/100ML
INJECTION, SOLUTION INTRAVENOUS CONTINUOUS
Status: DISCONTINUED | OUTPATIENT
Start: 2020-08-17 | End: 2020-08-17 | Stop reason: HOSPADM

## 2020-08-17 RX ORDER — CYCLOPENTOLATE HYDROCHLORIDE 10 MG/ML
1 SOLUTION/ DROPS OPHTHALMIC
Status: COMPLETED | OUTPATIENT
Start: 2020-08-17 | End: 2020-08-17

## 2020-08-17 RX ORDER — PHENYLEPHRINE HYDROCHLORIDE 25 MG/ML
1 SOLUTION/ DROPS OPHTHALMIC
Status: COMPLETED | OUTPATIENT
Start: 2020-08-17 | End: 2020-08-17

## 2020-08-17 RX ORDER — LIDOCAINE HYDROCHLORIDE 20 MG/ML
JELLY TOPICAL PRN
Status: DISCONTINUED | OUTPATIENT
Start: 2020-08-17 | End: 2020-08-17 | Stop reason: HOSPADM

## 2020-08-17 RX ORDER — TROPICAMIDE 10 MG/ML
1 SOLUTION/ DROPS OPHTHALMIC
Status: COMPLETED | OUTPATIENT
Start: 2020-08-17 | End: 2020-08-17

## 2020-08-17 RX ADMIN — SODIUM CHLORIDE, POTASSIUM CHLORIDE, SODIUM LACTATE AND CALCIUM CHLORIDE: 600; 310; 30; 20 INJECTION, SOLUTION INTRAVENOUS at 09:06

## 2020-08-17 RX ADMIN — MIDAZOLAM 1 MG: 1 INJECTION INTRAMUSCULAR; INTRAVENOUS at 09:36

## 2020-08-17 RX ADMIN — LIDOCAINE HYDROCHLORIDE 0.2 ML: 10 INJECTION, SOLUTION EPIDURAL; INFILTRATION; INTRACAUDAL; PERINEURAL at 08:48

## 2020-08-17 RX ADMIN — PHENYLEPHRINE HYDROCHLORIDE 1 DROP: 25 SOLUTION/ DROPS OPHTHALMIC at 08:47

## 2020-08-17 RX ADMIN — TROPICAMIDE 1 DROP: 10 SOLUTION/ DROPS OPHTHALMIC at 09:07

## 2020-08-17 RX ADMIN — MIDAZOLAM 2 MG: 1 INJECTION INTRAMUSCULAR; INTRAVENOUS at 09:28

## 2020-08-17 RX ADMIN — TROPICAMIDE 1 DROP: 10 SOLUTION/ DROPS OPHTHALMIC at 08:43

## 2020-08-17 RX ADMIN — PHENYLEPHRINE HYDROCHLORIDE 1 DROP: 25 SOLUTION/ DROPS OPHTHALMIC at 08:51

## 2020-08-17 RX ADMIN — PHENYLEPHRINE HYDROCHLORIDE 1 DROP: 25 SOLUTION/ DROPS OPHTHALMIC at 09:08

## 2020-08-17 RX ADMIN — CYCLOPENTOLATE HYDROCHLORIDE 1 DROP: 10 SOLUTION/ DROPS OPHTHALMIC at 08:50

## 2020-08-17 RX ADMIN — CYCLOPENTOLATE HYDROCHLORIDE 1 DROP: 10 SOLUTION/ DROPS OPHTHALMIC at 09:08

## 2020-08-17 RX ADMIN — MIDAZOLAM 1 MG: 1 INJECTION INTRAMUSCULAR; INTRAVENOUS at 09:52

## 2020-08-17 RX ADMIN — CYCLOPENTOLATE HYDROCHLORIDE 1 DROP: 10 SOLUTION/ DROPS OPHTHALMIC at 08:44

## 2020-08-17 RX ADMIN — TROPICAMIDE 1 DROP: 10 SOLUTION/ DROPS OPHTHALMIC at 08:49

## 2020-08-17 ASSESSMENT — MIFFLIN-ST. JEOR: SCORE: 1786.1

## 2020-08-17 NOTE — ANESTHESIA POSTPROCEDURE EVALUATION
Patient: Ann Vargas    Procedure(s):  Cataract Removal with Implant    Diagnosis:Cataract [H26.9]  Diagnosis Additional Information: No value filed.    Anesthesia Type:  MAC    Note:  Anesthesia Post Evaluation    Patient location during evaluation: Phase 2 and Bedside  Patient participation: Able to fully participate in evaluation  Level of consciousness: awake and alert  Pain management: adequate  Airway patency: patent  Cardiovascular status: acceptable  Respiratory status: acceptable  Hydration status: acceptable  PONV: none     Anesthetic complications: None          Last vitals:  Vitals:    08/17/20 0822 08/17/20 1014   BP: (!) 148/90 133/81   Resp: 18 18   Temp: 36.8  C (98.3  F) 36.7  C (98.1  F)   SpO2: 98% 94%         Electronically Signed By: MAEGAN Arce CRNA  August 17, 2020  10:24 AM

## 2020-08-17 NOTE — OP NOTE
CATARACT OPERATIVE NOTE    PATIENT: Ann Vargas  DATE OF SURGERY: 8/17/2020  PREOPERATIVE DIAGNOSIS:  Senile Nuclear Cataract, Right eye  POSTOPERATIVE DIAGNOSIS:  Senile Nuclear Cataract, and intraoperative floppy iris syndrome, Right eye  OPERATIVE PROCEDURE:  Complex Phacoemulsification and placement of intraocular lens, requiring Malyugin Ring  SURGEON:  Ricki Luo MD  ANESTHESIA:  Topical / MAC  EBL:  None  SPECIMENS:  None  COMPLICATIONS:  None    PROCEDURE:  The patient was brought to the operating room at [facility].  The right eye was prepped and draped in the usual fashion for cataract surgery.  A wire lid speculum was inserted.  A super sharp blade was used to make a paracentesis at the 11 O'clock position.  The super sharp blade was used to make a partial thickness temporal groove, which was 3 mm in length.  0.8 mL of non-preserved epi-Shugarcaine was injected into the anterior chamber.  The anterior chamber was flushed with a Balanced Salt Solution.  Viscoelastic was used to inflate the anterior chamber through a cannula.  A 2.5 mm microkeratome was used to make a temporal clear corneal incision in a two-plane fashion.  Due to intraoperative floppy iris, and posterior synechiae, the pupil was stretched and a malyugin ring was inserted to dilate and secure the iris in the usual manner.  A cystotome needle and forceps were used to make a capsulorrhexis.  Hydrodissection and hydrodelineation were performed with Balance Salt Solution.  The lens was then phacoemulsified and removed without complications.  The cortical material was removed with bimanual irrigation and aspiration.  The capsular bag was filled with viscoelastic.  A posterior chamber intraocular lens, preselected and recorded, was folded and inserted into the capsular bag.  The malyugin ring was dis-inserted from the anterior chamber in the usual manner.  The viscoelastic was removed with the irrigation  and aspiration tip.  Balanced Salt Solution with Vigamox, 150mg/0.1mL, was used to refill the anterior chamber.  The wounds were checked for water tightness and required no suture.  The wire lid speculum was removed.  The patient's right eye was cleaned and a drop of each post-operative drop was placed, followed by a dalal shield.  The patient tolerated the procedure well, and there were no complications.      Ricki Luo MD

## 2020-08-17 NOTE — ANESTHESIA CARE TRANSFER NOTE
Patient: Ann Vargas    Procedure(s):  Cataract Removal with Implant    Diagnosis: Cataract [H26.9]  Diagnosis Additional Information: No value filed.    Anesthesia Type:   MAC     Note:  Airway :Room Air  Patient transferred to:Phase II  Comments: Patient's VSS. Spontaneous respirations. Patient awake and oriented. IV patent. Report to RN.Handoff Report: Identifed the Patient, Identified the Reponsible Provider, Reviewed the pertinent medical history, Discussed the surgical course, Reviewed Intra-OP anesthesia mangement and issues during anesthesia, Set expectations for post-procedure period and Allowed opportunity for questions and acknowledgement of understanding      Vitals: (Last set prior to Anesthesia Care Transfer)    CRNA VITALS  8/17/2020 0938 - 8/17/2020 1008      8/17/2020             Pulse:  81    SpO2:  92 %                Electronically Signed By: MAEGAN Arce CRNA  August 17, 2020  10:08 AM

## 2020-08-18 ENCOUNTER — ALLIED HEALTH/NURSE VISIT (OUTPATIENT)
Dept: DERMATOLOGY | Facility: CLINIC | Age: 55
End: 2020-08-18
Payer: COMMERCIAL

## 2020-08-18 DIAGNOSIS — Z48.01 ENCOUNTER FOR CHANGE OR REMOVAL OF SURGICAL WOUND DRESSING: Primary | ICD-10-CM

## 2020-08-18 PROCEDURE — 99207 ZZC NO CHARGE NURSE ONLY: CPT

## 2020-08-18 RX ORDER — FLUOCINONIDE TOPICAL SOLUTION USP, 0.05% 0.5 MG/ML
SOLUTION TOPICAL 2 TIMES DAILY
Qty: 120 ML | Refills: 3 | Status: SHIPPED | OUTPATIENT
Start: 2020-08-18 | End: 2022-03-30

## 2020-08-18 RX ORDER — KETOCONAZOLE 20 MG/ML
SHAMPOO TOPICAL
Qty: 240 ML | Refills: 11 | Status: SHIPPED | OUTPATIENT
Start: 2020-08-18 | End: 2022-03-30

## 2020-08-18 NOTE — PATIENT INSTRUCTIONS

## 2020-08-18 NOTE — PROGRESS NOTES
Pt returned to clinic for post surgery 1 week follow up bandage change. Pt has no complaints, denies pain. Bandage removed from left lateral orbit, area cleansed with normal saline. Site is healing and wound edges approximating well. Reapplied new steri strips and paper tape.    Advised to watch for signs/sx of infection; spreading redness, drainage, odor, fever. Call or report promptly to clinic. Pt given written instructions and informed to rtc as needed. Patient verbalized understanding.

## 2020-09-04 ENCOUNTER — AMBULATORY - HEALTHEAST (OUTPATIENT)
Dept: FAMILY MEDICINE | Facility: CLINIC | Age: 55
End: 2020-09-04

## 2020-09-04 DIAGNOSIS — Z11.59 ENCOUNTER FOR SCREENING FOR OTHER VIRAL DISEASES: ICD-10-CM

## 2020-09-05 ENCOUNTER — AMBULATORY - HEALTHEAST (OUTPATIENT)
Dept: FAMILY MEDICINE | Facility: CLINIC | Age: 55
End: 2020-09-05

## 2020-09-05 DIAGNOSIS — Z11.59 ENCOUNTER FOR SCREENING FOR OTHER VIRAL DISEASES: ICD-10-CM

## 2020-09-08 ENCOUNTER — COMMUNICATION - HEALTHEAST (OUTPATIENT)
Dept: SCHEDULING | Facility: CLINIC | Age: 55
End: 2020-09-08

## 2020-09-08 ENCOUNTER — ANESTHESIA EVENT (OUTPATIENT)
Dept: SURGERY | Facility: CLINIC | Age: 55
End: 2020-09-08
Payer: COMMERCIAL

## 2020-09-08 NOTE — ANESTHESIA PREPROCEDURE EVALUATION
Anesthesia Pre-Procedure Evaluation    Patient: Ann Vargas   MRN: [id number] : 1965          Preoperative Diagnosis: Cataract [H26.9]    Procedure(s):  Cataract Removal with Implant    Past Medical History:   Diagnosis Date     Chronic rhinitis 10/23/2006    10/23/2006 Had been on shots--now on singulair  Working well     Complication of anesthesia      Diagnostic skin and sensitization tests (aka ALLERGENS)     3/11/15 IgE tests NEGATIVE for orange, tomato, strawberry, apple--cherry, g. pepper and mushrrom are pending.     GERD (gastroesophageal reflux disease)      Hyperlipidemia      PONV (postoperative nausea and vomiting)      Unexplained endometrial cells on cervical cytology 12/17/15    2/15/16 emb scheduled.      Past Surgical History:   Procedure Laterality Date     C/SECTION, CLASSICAL  Aug 93 &     , Classical x 2     EYE SURGERY  ?    detachted retina surgery     LUMPECTOMY BREAST WITH SEED LOCALIZATION Left 2014    Procedure: LUMPECTOMY BREAST WITH SEED LOCALIZATION;  Surgeon: Shonna Leung MD;  Location: [facility]     LUMPECTOMY BREAST WITH SENTINEL NODE, COMBINED Left 2014    Procedure: COMBINED LUMPECTOMY BREAST WITH SENTINEL NODE;  Surgeon: Shonna Leung MD;  Location: [facility]     PHACOEMULSIFICATION WITH STANDARD INTRAOCULAR LENS IMPLANT Right 2020    Procedure: Cataract Removal with Implant;  Surgeon: Ricki Luo MD;  Location: [facility]     SURGICAL HISTORY OF -       detached retina       Anesthesia Evaluation     . Pt has had prior anesthetic. Type: General, MAC and Regional    History of anesthetic complications   - PONV        ROS/MED HX    ENT/Pulmonary:     (+)allergic rhinitis, , . .    Neurologic:  - neg neurologic ROS     Cardiovascular:     (+) Dyslipidemia, ----. : . . . :. .       METS/Exercise Tolerance:     Hematologic:  - neg hematologic  ROS       Musculoskeletal:  - neg musculoskeletal ROS      "  GI/Hepatic:     (+) GERD Asymptomatic on medication,       Renal/Genitourinary:  - ROS Renal section negative       Endo:     (+) Obesity, .      Psychiatric:  - neg psychiatric ROS       Infectious Disease:  - neg infectious disease ROS       Malignancy:   (+) Malignancy (left) History of Breast  Breast CA status post Surgery.         Other: Comment: Hx retinal detachment                         Physical Exam  Normal systems: cardiovascular, pulmonary and dental    Airway   Mallampati: II  TM distance: >3 FB  Neck ROM: full    Dental     Cardiovascular       Pulmonary             Lab Results   Component Value Date    WBC 9.8 02/13/2015    HGB 12.2 02/13/2015    HCT 39.1 02/13/2015     02/13/2015    CRP 7.6 03/11/2015    SED 35 (H) 03/11/2015     10/15/2019    POTASSIUM 4.0 10/15/2019    CHLORIDE 107 10/15/2019    CO2 27 10/15/2019    BUN 10 10/15/2019    CR 0.73 10/15/2019    GLC 77 10/15/2019    ANGELLA 10.0 10/15/2019    ALBUMIN 3.6 10/15/2019    PROTTOTAL 7.8 10/15/2019    ALT 41 10/15/2019    AST 23 10/15/2019    ALKPHOS 136 10/15/2019    BILITOTAL 0.4 10/15/2019    LIPASE 44 12/15/2012    TSH 2.57 12/17/2015    T4 0.97 09/13/2010    HCG Negative 11/19/2014    HCGS Negative 12/15/2012       Preop Vitals  BP Readings from Last 3 Encounters:   08/17/20 (!) 147/89   08/12/20 (!) 149/94   10/29/19 (!) 154/69    Pulse Readings from Last 3 Encounters:   08/12/20 83   10/29/19 80   07/08/19 88      Resp Readings from Last 3 Encounters:   08/17/20 18   10/29/19 20   07/08/19 18    SpO2 Readings from Last 3 Encounters:   08/17/20 94%   08/12/20 98%   10/29/19 95%      Temp Readings from Last 1 Encounters:   08/17/20 36.7  C (98.1  F)    Ht Readings from Last 1 Encounters:   08/17/20 1.676 m (5' 6\")      Wt Readings from Last 1 Encounters:   08/17/20 117.9 kg (260 lb)    Estimated body mass index is 41.97 kg/m  as calculated from the following:    Height as of 8/17/20: 1.676 m (5' 6\").    Weight as of 8/17/20: " 117.9 kg (260 lb).       Anesthesia Plan      History & Physical Review  History and physical reviewed and following examination; no interval change.    ASA Status:  3 .    NPO Status:  > 6 hours    Plan for MAC Reason for MAC:  Deep or markedly invasive procedure (G8)           Postoperative Care      Consents  Anesthetic plan, risks, benefits and alternatives discussed with:  Patient..                 AMEGAN Parra CRNA

## 2020-09-08 NOTE — H&P
[facility]  TOTAL EYE CARE  [address]  [facility] [address]  [phone]  Dept: [phone]    OPHTHALMOLOGY PRE-OPERATIVE  HISTORY AND PHYSICAL    DATE OF H/P:  9/3/2020    DATE OF SURGERY:  2020  PROCEDURE:  Procedure(s):  Cataract Removal with Implant, Left Eye  LENS IMPLANT:  [id number] +10.5  REFRACTIVE GOAL:  PL Sph  SURGEON:  Ricki Luo MD    ANESTHESIA:  TOPICAL / MAC    OR CASE REQUIREMENTS:  H/o Iritis - likely need M-ring.    DEMOGRAPHICS:  Demographic Information on Ann Vargas:    Ann Vargas  Gender: female  : 1965  [address] [address]  [facility] [address] [phone] (home) [phone] (work)    Medical Record: [id number]  Social Security Number: [id number]  Pharmacy:    [employer] - A MAIL ORDER [email or web address] [facility] DEL[facility] - [address] PHARMACY [facility] [place] [address] [place] [id number] [place] [facility] [place], [place] - [address] [place] [address]  Primary Care Provider: Cydney Garcia    Parent's names are: Data Unavailable (mother) and Data Unavailable (father).    Insurance: Payor: BCBS / [facility] / Product Type: Indemnity /     OCULAR HISTORY:  Cataracts, s/p IOL right eye.  H/o iritis, each eye.  H/o RD, s/p SB left eye.    HISTORIES:  Past Medical History:   Diagnosis Date     Chronic rhinitis 10/23/2006    10/23/2006 Had been on shots--now on singulair  Working well     Complication of anesthesia      Diagnostic skin and sensitization tests (aka ALLERGENS)     3/11/15 IgE tests NEGATIVE for orange, tomato, strawberry, apple--cherry, g. pepper and mushrrom are pending.     GERD (gastroesophageal reflux disease)      Hyperlipidemia      PONV (postoperative nausea and vomiting)      Unexplained endometrial cells on cervical cytology 12/17/15    2/15/16 emb scheduled.        Past Surgical History:   Procedure Laterality Date     C/SECTION, CLASSICAL  Aug 93 &      , Classical x 2     EYE SURGERY  ?    detachted retina surgery     LUMPECTOMY BREAST WITH SEED LOCALIZATION Left 2014    Procedure: LUMPECTOMY BREAST WITH SEED LOCALIZATION;  Surgeon: Shonna Leung MD;  Location: [facility]     LUMPECTOMY BREAST WITH SENTINEL NODE, COMBINED Left 2014    Procedure: COMBINED LUMPECTOMY BREAST WITH SENTINEL NODE;  Surgeon: Shonna Leung MD;  Location: [facility]     PHACOEMULSIFICATION WITH STANDARD INTRAOCULAR LENS IMPLANT Right 2020    Procedure: Cataract Removal with Implant;  Surgeon: Ricki Luo MD;  Location: [facility]     SURGICAL HISTORY OF -       detached retina       Family History   Problem Relation Age of Onset     Hypertension Mother      Lipids Mother      Depression Mother         bipolar, also hysterectomy for fibroid     Genitourinary Problems Mother         Kidney  - on transplant list     Allergies Mother      Obesity Mother      Cancer Mother         skin cancer     Lipids Brother      Allergies Father      Obesity Father      Cancer Father         skin cancer     Cancer Maternal Grandmother         ovarian     Cancer Maternal Grandfather         lung       Social History     Tobacco Use     Smoking status: Never Smoker     Smokeless tobacco: Never Used   Substance Use Topics     Alcohol use: Not Currently     Alcohol/week: 0.0 standard drinks     Comment: -monthly       MEDICATIONS:  No current facility-administered medications for this encounter.      Current Outpatient Medications   Medication Sig     ASPIRIN PO Take 650 mg by mouth as needed for moderate pain      DiphenhydrAMINE HCl (BENADRYL PO) Take 25 mg by mouth every 4 hours as needed for itching (Hives)     fluocinonide (LIDEX) 0.05 % external solution Apply topically 2 times daily To scalp     ketoconazole (NIZORAL) 2 % external shampoo Wash scalp 2-3 times weekly leave in 2-5 min and rinse     loteprednol (LOTEMAX) 0.5 % ophthalmic suspension  Place 1 drop into both eyes daily as needed.     montelukast (SINGULAIR) 10 MG tablet TAKE 1 TABLET AT BEDTIME     omeprazole (PRILOSEC) 40 MG DR capsule Take 1 capsule (40 mg) by mouth daily Allergy to yellow dye- can not contain yellow dye     simvastatin (ZOCOR) 40 MG tablet Take 1 tablet (40 mg) by mouth At Bedtime Appt DUE August 2020     timolol (TIMOPTIC) 0.5 % ophthalmic solution      EPINEPHrine (ADRENACLICK) 0.3 MG/0.3ML injection 2-pack Inject 0.3 mLs (0.3 mg) into the muscle as needed for anaphylaxis (Patient not taking: Reported on 10/29/2019)     exemestane (AROMASIN) 25 MG tablet Take 1 tablet (25 mg) by mouth daily Take after a meal. (Patient not taking: Reported on 8/12/2020)     exemestane (AROMASIN) 25 MG tablet Take 1 tablet (25 mg) by mouth daily (Patient not taking: Reported on 8/12/2020)       ALLERGIES:     Allergies   Allergen Reactions     Yellow Dye Anaphylaxis     Yellow dye #5/#6      Nka [No Known Allergies]        PERTINENT SYSTEMS REVIEW:    1. No - Do you have a history of heart attack, stroke, stent, bypass or surgery on an artery in the head, neck, heart or legs?  2. No - Do you ever have any pain or discomfort in your chest?  3. No - Do you have a history of  Heart Failure?  4. No - Are you troubled by shortness of breath when walking: On the level, up a slight hill or at night?  5. No - Do you currently have a cold, bronchitis or other respiratory infection?  6. No - Do you have a cough, shortness of breath or wheezing?  7. No - Do you sometimes get pains in the calves of your legs when you walk?  8. No - Do you or anyone in your family have previous history of blood clots?  9. No - Do you or does anyone in your family have a serious bleeding problem such as prolonged bleeding following surgeries or cuts?  10. No - Have you ever had problems with anemia or been told to take iron pills?  11. No - Have you had any abnormal blood loss such as black, tarry or bloody stools, or  abnormal vaginal bleeding?  12. No - Have you ever had a blood transfusion?  13. Yes: nausea - Have you or any of your relatives ever had problems with anesthesia?  14. No - Do you have sleep apnea, excessive snoring or daytime drowsiness?  15. No - Do you have any prosthetic heart valves?  16. No - Do you have prosthetic joints?    EXAMINATION:  Vitals were reviewed  Vison:  Va, left - 20/250;  HEENT:  Cataract, otherwise unremarkable.  LUNGS:  Clear  CV:  Regular rate and rhythm without murmur  ABD:  Soft and nontender  NEURO:  Alert and nonfocal    IMPRESSION:  Patient cleared for ophthalmic surgery.  Low risk with monitored, light sedation.  I have assessed the patient's DVT risk, and no additional orders necessary.    PLAN:  Procedure(s):  Cataract Removal with Implant, Left Eye      Ricki Luo MD

## 2020-09-09 ENCOUNTER — ANESTHESIA (OUTPATIENT)
Dept: SURGERY | Facility: CLINIC | Age: 55
End: 2020-09-09
Payer: COMMERCIAL

## 2020-09-09 ENCOUNTER — HOSPITAL ENCOUNTER (OUTPATIENT)
Facility: CLINIC | Age: 55
Discharge: HOME | End: 2020-09-09
Attending: OPHTHALMOLOGY | Admitting: OPHTHALMOLOGY
Payer: COMMERCIAL

## 2020-09-09 VITALS
RESPIRATION RATE: 18 BRPM | BODY MASS INDEX: 41.78 KG/M2 | TEMPERATURE: 98 F | OXYGEN SATURATION: 98 % | HEART RATE: 63 BPM | HEIGHT: 66 IN | WEIGHT: 260 LBS | DIASTOLIC BLOOD PRESSURE: 87 MMHG | SYSTOLIC BLOOD PRESSURE: 161 MMHG

## 2020-09-09 PROCEDURE — 25000125 ZZHC RX 250: Performed by: NURSE ANESTHETIST, CERTIFIED REGISTERED

## 2020-09-09 PROCEDURE — 25000128 H RX IP 250 OP 636: Performed by: NURSE ANESTHETIST, CERTIFIED REGISTERED

## 2020-09-09 PROCEDURE — 25000125 ZZHC RX 250: Performed by: OPHTHALMOLOGY

## 2020-09-09 PROCEDURE — 71000022 ZZH RECOVERY CATRACT PACKAGE: Performed by: OPHTHALMOLOGY

## 2020-09-09 PROCEDURE — 37000012 ZZH ANESTHESIA CATARACT PACKAGE: Performed by: OPHTHALMOLOGY

## 2020-09-09 PROCEDURE — V2632 POST CHMBR INTRAOCULAR LENS: HCPCS | Performed by: OPHTHALMOLOGY

## 2020-09-09 PROCEDURE — 25800030 ZZH RX IP 258 OP 636: Performed by: NURSE ANESTHETIST, CERTIFIED REGISTERED

## 2020-09-09 PROCEDURE — 36000025 ZZH CATARACT SURGICAL PACKAGE: Performed by: OPHTHALMOLOGY

## 2020-09-09 PROCEDURE — 25000128 H RX IP 250 OP 636: Performed by: OPHTHALMOLOGY

## 2020-09-09 DEVICE — EYE IMP IOL AMO PCL TECNIS ZCB00 10.5: Type: IMPLANTABLE DEVICE | Site: EYE | Status: FUNCTIONAL

## 2020-09-09 RX ORDER — SODIUM CHLORIDE, SODIUM LACTATE, POTASSIUM CHLORIDE, CALCIUM CHLORIDE 600; 310; 30; 20 MG/100ML; MG/100ML; MG/100ML; MG/100ML
INJECTION, SOLUTION INTRAVENOUS CONTINUOUS
Status: DISCONTINUED | OUTPATIENT
Start: 2020-09-09 | End: 2020-09-09 | Stop reason: HOSPADM

## 2020-09-09 RX ORDER — BALANCED SALT SOLUTION 6.4; .75; .48; .3; 3.9; 1.7 MG/ML; MG/ML; MG/ML; MG/ML; MG/ML; MG/ML
SOLUTION OPHTHALMIC PRN
Status: DISCONTINUED | OUTPATIENT
Start: 2020-09-09 | End: 2020-09-09 | Stop reason: HOSPADM

## 2020-09-09 RX ORDER — PHENYLEPHRINE HYDROCHLORIDE 25 MG/ML
1 SOLUTION/ DROPS OPHTHALMIC
Status: COMPLETED | OUTPATIENT
Start: 2020-09-09 | End: 2020-09-09

## 2020-09-09 RX ORDER — CYCLOPENTOLATE HYDROCHLORIDE 10 MG/ML
1 SOLUTION/ DROPS OPHTHALMIC
Status: COMPLETED | OUTPATIENT
Start: 2020-09-09 | End: 2020-09-09

## 2020-09-09 RX ORDER — SODIUM CHLORIDE, SODIUM LACTATE, POTASSIUM CHLORIDE, CALCIUM CHLORIDE 600; 310; 30; 20 MG/100ML; MG/100ML; MG/100ML; MG/100ML
INJECTION, SOLUTION INTRAVENOUS CONTINUOUS
Status: CANCELLED | OUTPATIENT
Start: 2020-09-09

## 2020-09-09 RX ORDER — LIDOCAINE HYDROCHLORIDE 20 MG/ML
JELLY TOPICAL PRN
Status: DISCONTINUED | OUTPATIENT
Start: 2020-09-09 | End: 2020-09-09 | Stop reason: HOSPADM

## 2020-09-09 RX ORDER — LIDOCAINE 40 MG/G
CREAM TOPICAL
Status: DISCONTINUED | OUTPATIENT
Start: 2020-09-09 | End: 2020-09-09 | Stop reason: HOSPADM

## 2020-09-09 RX ORDER — TROPICAMIDE 10 MG/ML
1 SOLUTION/ DROPS OPHTHALMIC
Status: COMPLETED | OUTPATIENT
Start: 2020-09-09 | End: 2020-09-09

## 2020-09-09 RX ORDER — PROPARACAINE HYDROCHLORIDE 5 MG/ML
SOLUTION/ DROPS OPHTHALMIC PRN
Status: DISCONTINUED | OUTPATIENT
Start: 2020-09-09 | End: 2020-09-09 | Stop reason: HOSPADM

## 2020-09-09 RX ADMIN — PHENYLEPHRINE HYDROCHLORIDE 1 DROP: 25 SOLUTION/ DROPS OPHTHALMIC at 06:59

## 2020-09-09 RX ADMIN — TROPICAMIDE 1 DROP: 10 SOLUTION/ DROPS OPHTHALMIC at 06:59

## 2020-09-09 RX ADMIN — MIDAZOLAM 2 MG: 1 INJECTION INTRAMUSCULAR; INTRAVENOUS at 08:00

## 2020-09-09 RX ADMIN — LIDOCAINE HYDROCHLORIDE 1 ML: 10 INJECTION, SOLUTION EPIDURAL; INFILTRATION; INTRACAUDAL; PERINEURAL at 06:50

## 2020-09-09 RX ADMIN — TROPICAMIDE 1 DROP: 10 SOLUTION/ DROPS OPHTHALMIC at 06:48

## 2020-09-09 RX ADMIN — CYCLOPENTOLATE HYDROCHLORIDE 1 DROP: 10 SOLUTION/ DROPS OPHTHALMIC at 06:48

## 2020-09-09 RX ADMIN — PHENYLEPHRINE HYDROCHLORIDE 1 DROP: 25 SOLUTION/ DROPS OPHTHALMIC at 06:48

## 2020-09-09 RX ADMIN — CYCLOPENTOLATE HYDROCHLORIDE 1 DROP: 10 SOLUTION/ DROPS OPHTHALMIC at 06:59

## 2020-09-09 RX ADMIN — PHENYLEPHRINE HYDROCHLORIDE 1 DROP: 25 SOLUTION/ DROPS OPHTHALMIC at 06:52

## 2020-09-09 RX ADMIN — CYCLOPENTOLATE HYDROCHLORIDE 1 DROP: 10 SOLUTION/ DROPS OPHTHALMIC at 06:52

## 2020-09-09 RX ADMIN — TROPICAMIDE 1 DROP: 10 SOLUTION/ DROPS OPHTHALMIC at 06:52

## 2020-09-09 RX ADMIN — SODIUM CHLORIDE, POTASSIUM CHLORIDE, SODIUM LACTATE AND CALCIUM CHLORIDE 100 ML: 600; 310; 30; 20 INJECTION, SOLUTION INTRAVENOUS at 06:50

## 2020-09-09 ASSESSMENT — MIFFLIN-ST. JEOR: SCORE: 1786.1

## 2020-09-09 NOTE — ANESTHESIA POSTPROCEDURE EVALUATION
Patient: Ann Vargas    Procedure(s):  Cataract Removal with Implant    Diagnosis:Cataract [H26.9]  Diagnosis Additional Information: No value filed.    Anesthesia Type:  MAC    Note:  Anesthesia Post Evaluation    Patient location during evaluation: Bedside  Patient participation: Able to fully participate in evaluation  Level of consciousness: awake and alert  Pain management: adequate  Airway patency: patent  Cardiovascular status: acceptable  Respiratory status: acceptable  Hydration status: acceptable  PONV: none     Anesthetic complications: None          Last vitals:  Vitals:    09/09/20 0628   BP: (!) 156/87   Pulse: 69   Resp: 16   Temp: 36.7  C (98.1  F)   SpO2: 95%         Electronically Signed By: Jose Alejandro Akins CRNA, APRN CRNA  September 9, 2020  8:08 AM

## 2020-09-09 NOTE — OP NOTE
CATARACT OPERATIVE NOTE    PATIENT: Ann Vargas  DATE OF SURGERY: 9/9/2020  PREOPERATIVE DIAGNOSIS:  Senile Nuclear Cataract, Left eye  POSTOPERATIVE DIAGNOSIS:  Senile Nuclear Cataract, Left eye  OPERATIVE PROCEDURE:  Phacoemulsification and placement of intraocular lens  SURGEON:  Ricki Luo MD  ANESTHESIA:  Topical / MAC  EBL:  None  SPECIMENS:  None  COMPLICATIONS:  None    PROCEDURE:  The patient was brought to the operating room at [facility].  The left eye was prepped and draped in the usual fashion for cataract surgery.  A wire lid speculum was inserted.  A super sharp blade was used to make a paracentesis at the 5 O'clock position.  The super sharp blade was used to make a partial thickness temporal groove, which was 3 mm in length.  0.8 mL of non-preserved epi-Shugarcaine was injected into the anterior chamber.  Viscoelastic was used to inflate the anterior chamber through a cannula.  A 2.5 mm microkeratome was used to make a temporal clear corneal incision in a two-plane fashion.  A cystotome needle and forceps were used to make a capsulorrhexis.  Hydrodissection and hydrodelineation were performed with Balance Salt Solution.  The lens was then phacoemulsified and removed without complications.  The cortical material was removed with bimanual irrigation and aspiration.  The capsular bag was filled with viscoelastic.  A posterior chamber intraocular lens, preselected and recorded, was folded and inserted into the capsular bag.  The viscoelastic was removed with the irrigation and aspiration tip.  Balanced Salt Solution with Vigamox, 150mg/0.1mL, was used to refill the anterior chamber.  The wounds were checked for water tightness and required no suture.  The wire lid speculum was removed.  The patient's left eye was cleaned and a drop of each post-operative drop was placed, followed by a dalal shield.  The patient tolerated the procedure well, and there were no  complications.      Ricki Luo MD

## 2020-09-09 NOTE — OR NURSING
Pt alert and oriented x 4. Has had food and fluids without incident.     Louise Vides RN on 9/9/2020 at 8:56 AM

## 2020-10-04 DIAGNOSIS — E78.5 HYPERLIPIDEMIA LDL GOAL <130: ICD-10-CM

## 2020-10-05 RX ORDER — SIMVASTATIN 40 MG
TABLET ORAL
Qty: 30 TABLET | Refills: 0 | Status: SHIPPED | OUTPATIENT
Start: 2020-10-05 | End: 2020-11-09

## 2020-10-05 NOTE — TELEPHONE ENCOUNTER
Pt was called and notified.  She will call back tomorrow after she checks her schedule, and schedule.    Niya CORTEZ RN, BSN

## 2020-10-05 NOTE — TELEPHONE ENCOUNTER
"Requested Prescriptions   Pending Prescriptions Disp Refills     simvastatin (ZOCOR) 40 MG tablet [Pharmacy Med Name: SIMVASTATIN TABS 40MG] 90 tablet 3     Sig: TAKE 1 TABLET AT BEDTIME ( APPOINTMENT DUE AUGUST 2020 )       Statins Protocol Failed - 10/4/2020  6:26 AM        Failed - LDL on file in past 12 months     Recent Labs   Lab Test 07/08/19  1759   LDL 62             Passed - No abnormal creatine kinase in past 12 months     No lab results found.             Passed - Recent (12 mo) or future (30 days) visit within the authorizing provider's specialty     Patient has had an office visit with the authorizing provider or a provider within the authorizing providers department within the previous 12 mos or has a future within next 30 days. See \"Patient Info\" tab in inbasket, or \"Choose Columns\" in Meds & Orders section of the refill encounter.              Passed - Medication is active on med list        Passed - Patient is age 18 or older        Passed - No active pregnancy on record        Passed - No positive pregnancy test in past 12 months             "

## 2020-10-08 DIAGNOSIS — J30.9 CHRONIC ALLERGIC RHINITIS: ICD-10-CM

## 2020-10-08 RX ORDER — MONTELUKAST SODIUM 10 MG/1
TABLET ORAL
Qty: 30 TABLET | Refills: 11 | OUTPATIENT
Start: 2020-10-08

## 2020-10-08 NOTE — TELEPHONE ENCOUNTER
"Requested Prescriptions   Pending Prescriptions Disp Refills     montelukast (SINGULAIR) 10 MG tablet [Pharmacy Med Name: MONTELUKAST SODIUM TABS 10MG] 30 tablet 11     Sig: TAKE 1 TABLET AT BEDTIME (OVERDUE FOR OFFICE VISIT AND LABS, PLEASE CALL THE CLINIC TO SCHEDULE -[phone])       Leukotriene Inhibitors Protocol Passed - 10/8/2020  7:45 AM        Passed - Patient is age 12 or older     If patient is under 16, ok to refill using age based dosing.           Passed - Recent (12 mo) or future (30 days) visit within the authorizing provider's specialty     Patient has had an office visit with the authorizing provider or a provider within the authorizing providers department within the previous 12 mos or has a future within next 30 days. See \"Patient Info\" tab in inbasket, or \"Choose Columns\" in Meds & Orders section of the refill encounter.              Passed - Medication is active on med list             "

## 2020-10-26 ENCOUNTER — TELEPHONE (OUTPATIENT)
Dept: FAMILY MEDICINE | Facility: CLINIC | Age: 55
End: 2020-10-26

## 2020-10-26 NOTE — TELEPHONE ENCOUNTER
Patient has a lab appointment. She is requesting the Covid serology test. Please place a lab order if you would like patient to get this test. Thank You!

## 2020-10-26 NOTE — TELEPHONE ENCOUNTER
I thought people could just get this testing without an order.  I need to do an e-visit or virtual visit with her to order the test otherwise, there are questions that need to be answered in order for me to order the test.     Cydney Garcia M.D.

## 2020-10-26 NOTE — TELEPHONE ENCOUNTER
I left a message at the lab to have them advise her to call for the requested antibody testing.   Karla Dimas RNC

## 2020-10-28 ENCOUNTER — IMMUNIZATION (OUTPATIENT)
Dept: FAMILY MEDICINE | Facility: CLINIC | Age: 55
End: 2020-10-28
Payer: COMMERCIAL

## 2020-10-28 DIAGNOSIS — Z85.3 HISTORY OF LEFT BREAST CANCER: ICD-10-CM

## 2020-10-28 DIAGNOSIS — E78.5 HYPERLIPIDEMIA LDL GOAL <130: ICD-10-CM

## 2020-10-28 LAB
ALBUMIN SERPL-MCNC: 3.1 G/DL (ref 3.4–5)
ALP SERPL-CCNC: 114 U/L (ref 40–150)
ALT SERPL W P-5'-P-CCNC: 45 U/L (ref 0–50)
ANION GAP SERPL CALCULATED.3IONS-SCNC: 4 MMOL/L (ref 3–14)
AST SERPL W P-5'-P-CCNC: 25 U/L (ref 0–45)
BILIRUB SERPL-MCNC: 0.4 MG/DL (ref 0.2–1.3)
BUN SERPL-MCNC: 12 MG/DL (ref 7–30)
CALCIUM SERPL-MCNC: 9.6 MG/DL (ref 8.5–10.1)
CANCER AG27-29 SERPL-ACNC: 11 U/ML (ref 0–39)
CHLORIDE SERPL-SCNC: 110 MMOL/L (ref 94–109)
CHOLEST SERPL-MCNC: 175 MG/DL
CO2 SERPL-SCNC: 26 MMOL/L (ref 20–32)
CREAT SERPL-MCNC: 0.74 MG/DL (ref 0.52–1.04)
GFR SERPL CREATININE-BSD FRML MDRD: >90 ML/MIN/{1.73_M2}
GLUCOSE SERPL-MCNC: 95 MG/DL (ref 70–99)
HDLC SERPL-MCNC: 65 MG/DL
LDLC SERPL CALC-MCNC: 82 MG/DL
NONHDLC SERPL-MCNC: 110 MG/DL
POTASSIUM SERPL-SCNC: 4.1 MMOL/L (ref 3.4–5.3)
PROT SERPL-MCNC: 7.1 G/DL (ref 6.8–8.8)
SODIUM SERPL-SCNC: 140 MMOL/L (ref 133–144)
TRIGL SERPL-MCNC: 141 MG/DL

## 2020-10-28 PROCEDURE — 86300 IMMUNOASSAY TUMOR CA 15-3: CPT | Performed by: INTERNAL MEDICINE

## 2020-10-28 PROCEDURE — 90682 RIV4 VACC RECOMBINANT DNA IM: CPT

## 2020-10-28 PROCEDURE — 80053 COMPREHEN METABOLIC PANEL: CPT | Performed by: INTERNAL MEDICINE

## 2020-10-28 PROCEDURE — 90471 IMMUNIZATION ADMIN: CPT

## 2020-10-28 PROCEDURE — 80061 LIPID PANEL: CPT | Performed by: FAMILY MEDICINE

## 2020-11-09 ENCOUNTER — VIRTUAL VISIT (OUTPATIENT)
Dept: FAMILY MEDICINE | Facility: CLINIC | Age: 55
End: 2020-11-09
Payer: COMMERCIAL

## 2020-11-09 DIAGNOSIS — E78.5 HYPERLIPIDEMIA LDL GOAL <130: ICD-10-CM

## 2020-11-09 DIAGNOSIS — I10 BENIGN ESSENTIAL HYPERTENSION: Primary | ICD-10-CM

## 2020-11-09 DIAGNOSIS — T78.2XXS ANAPHYLAXIS, SEQUELA: ICD-10-CM

## 2020-11-09 DIAGNOSIS — J30.9 CHRONIC ALLERGIC RHINITIS: ICD-10-CM

## 2020-11-09 DIAGNOSIS — K21.9 GASTROESOPHAGEAL REFLUX DISEASE WITHOUT ESOPHAGITIS: ICD-10-CM

## 2020-11-09 PROCEDURE — 99214 OFFICE O/P EST MOD 30 MIN: CPT | Mod: 95 | Performed by: FAMILY MEDICINE

## 2020-11-09 RX ORDER — EPINEPHRINE 0.3 MG/.3ML
0.3 INJECTION SUBCUTANEOUS PRN
Qty: 0.6 ML | Refills: 1 | Status: SHIPPED | OUTPATIENT
Start: 2020-11-09 | End: 2022-03-31

## 2020-11-09 RX ORDER — LISINOPRIL 10 MG/1
10 TABLET ORAL DAILY
Qty: 30 TABLET | Refills: 1 | Status: SHIPPED | OUTPATIENT
Start: 2020-11-09 | End: 2020-12-18

## 2020-11-09 RX ORDER — MONTELUKAST SODIUM 10 MG/1
1 TABLET ORAL AT BEDTIME
Qty: 90 TABLET | Refills: 3 | Status: SHIPPED | OUTPATIENT
Start: 2020-11-09 | End: 2022-02-21

## 2020-11-09 RX ORDER — OMEPRAZOLE 40 MG/1
40 CAPSULE, DELAYED RELEASE ORAL DAILY
Qty: 90 CAPSULE | Refills: 3 | Status: SHIPPED | OUTPATIENT
Start: 2020-11-09 | End: 2021-12-20

## 2020-11-09 RX ORDER — SIMVASTATIN 40 MG
TABLET ORAL
Qty: 90 TABLET | Refills: 3 | Status: SHIPPED | OUTPATIENT
Start: 2020-11-09 | End: 2021-10-14

## 2020-11-09 NOTE — PROGRESS NOTES
"Ann Vargas is a 54 year old female who is being evaluated via a billable video visit.      The patient has been notified of following:     \"This video visit will be conducted via a call between you and your physician/provider. We have found that certain health care needs can be provided without the need for an in-person physical exam.  This service lets us provide the care you need with a video conversation.  If a prescription is necessary we can send it directly to your pharmacy.  If lab work is needed we can place an order for that and you can then stop by our lab to have the test done at a later time.    Video visits are billed at different rates depending on your insurance coverage.  Please reach out to your insurance provider with any questions.    If during the course of the call the physician/provider feels a video visit is not appropriate, you will not be charged for this service.\"    Patient has given verbal consent for Video visit? Yes  How would you like to obtain your AVS? Mail a copy  If you are dropped from the video visit, the video invite should be resent to: Text to cell phone:    Will anyone else be joining your video visit? No     Subjective     Ann Vargas is a 54 year old female who presents today via video visit for the following health issues:    HPI     Hyperlipidemia Follow-Up      Are you regularly taking any medication or supplement to lower your cholesterol?   Yes- simvastatin     Are you having muscle aches or other side effects that you think could be caused by your cholesterol lowering medication?  No      How many servings of fruits and vegetables do you eat daily?  4 or more    On average, how many sweetened beverages do you drink each day (Examples: soda, juice, sweet tea, etc.  Do NOT count diet or artificially sweetened beverages)?   0    How many days per week do you exercise enough to make your heart beat faster? 3 or less    How many minutes a day do you exercise " enough to make your heart beat faster? 9 or less    How many days per week do you miss taking your medication? 0    Medication Followup of medication listed in      Taking Medication as prescribed: yes    Side Effects:  None    Medication Helping Symptoms:  yes     Hypertension Follow-up      Do you check your blood pressure regularly outside of the clinic? No     Are you following a low salt diet? No    Are your blood pressures ever more than 140 on the top number (systolic) OR more   than 90 on the bottom number (diastolic), for example 140/90? N/a - not checking        BP Readings from Last 6 Encounters:   09/09/20 (!) 161/87   08/17/20 (!) 147/89   08/12/20 (!) 149/94   10/29/19 (!) 154/69   07/08/19 124/84   04/23/19 135/79         Video Start Time: 8:20 AM        Review of Systems   Constitutional, HEENT, cardiovascular, pulmonary, gi and gu systems are negative, except as otherwise noted.    Omeprazole is working well for the most part.           Objective           Vitals:  No vitals were obtained today due to virtual visit.    Physical Exam     GENERAL: Healthy, alert and no distress  EYES: Eyes grossly normal to inspection.  No discharge or erythema, or obvious scleral/conjunctival abnormalities.  RESP: No audible wheeze, cough, or visible cyanosis.  No visible retractions or increased work of breathing.    SKIN: Visible skin clear. No significant rash, abnormal pigmentation or lesions.  NEURO: Cranial nerves grossly intact.  Mentation and speech appropriate for age.  PSYCH: Mentation appears normal, affect normal/bright, judgement and insight intact, normal speech and appearance well-groomed.      Labs reviewed.         Assessment & Plan     Benign essential hypertension  Start lisinopril 10 mg daily and recheck with RN on blood pressure and needs BMP in 2-3 weeks  Ordered labs and medication locally to [facility]  - lisinopril (ZESTRIL) 10 MG tablet; Take 1 tablet (10 mg) by mouth daily  -  "Basic metabolic panel; Future    Hyperlipidemia LDL goal <130   lipids well controlled, continue simvastatin  - simvastatin (ZOCOR) 40 MG tablet; TAKE 1 TABLET AT BEDTIME    Gastroesophageal reflux disease without esophagitis  Well controlled  Cut back on soda  - omeprazole (PRILOSEC) 40 MG DR capsule; Take 1 capsule (40 mg) by mouth daily Allergy to yellow dye- can not contain yellow dye    Chronic allergic rhinitis     - montelukast (SINGULAIR) 10 MG tablet; Take 1 tablet (10 mg) by mouth At Bedtime    Anaphylaxis, sequela     - EPINEPHrine (ADRENACLICK) 0.3 MG/0.3ML injection 2-pack; Inject 0.3 mLs (0.3 mg) into the muscle as needed for anaphylaxis     BMI:   Estimated body mass index is 41.97 kg/m  as calculated from the following:    Height as of 9/9/20: 1.676 m (5' 6\").    Weight as of 9/9/20: 117.9 kg (260 lb).                No follow-ups on file.    Cydney Garcia MD  [facility]      Video-Visit Details    Type of service:  Video Visit    Video End Time:8:30 AM    Originating Location (pt. Location): Home    Distant Location (provider location):  [facility]     Platform used for Video Visit: Leonora        "

## 2020-11-09 NOTE — PATIENT INSTRUCTIONS
Start Lisinopril 10 mg daily  Recheck blood pressure and blood work in 2-3 weeks, make RN visit for blood pressure check and lab visit for the blood work.      Patient Education     Discharge Instructions for High Blood Pressure (Hypertension)  You have been diagnosed with high blood pressure (hypertension). This means the force of blood against your artery walls is too strong. It also means your heart is working hard to move blood. High blood pressure usually has no symptoms, but over time, it can cause serious health problems. High blood pressure raises your risk for heart attack, stroke, heart disease, heart failure, kidney disease, and vision loss. With help from your doctor, you can manage your blood pressure and protect your health.   Blood pressure measurements are given as 2 numbers. Systolic blood pressure is the upper number. This is the pressure when the heart contracts or pumps. Diastolic blood pressure is the lower number. This is the pressure when the heart relaxes between beats.   Blood pressure is categorized as normal, elevated, or stage 1 or stage 2 high blood pressure:     Normal blood pressure is systolic of less than 120 and diastolic of less than 80 (120/80) at rest    Elevated blood pressure is systolic of 120 to 129 and diastolic less than 80 at rest    Stage 1 high blood pressure is systolic is 130 to 139 or diastolic between 80 to 89 at rest    Stage 2 high blood pressure is when systolic is 140 or higher or the diastolic is 90 or higher at rest  Taking medicine    Learn to measure your own blood pressure. Keep a record of your results. Ask your doctor which readings mean that you need medical attention.    Take your blood pressure medicine exactly as directed. Don t skip doses. Missing doses can cause your blood pressure to get out of control.    If you do miss a dose (or doses) check with your healthcare provider about what to do.    Don't take medicines that contain heart stimulants,  including over-the-counter medicines. Check for warnings about high blood pressure on the label. Ask the pharmacist before purchasing something you haven't used before    Check with your doctor or pharmacist before taking a decongestant such as pseudoephedrine or phenylephrine. Some decongestants can worsen high blood pressure.    Lifestyle changes    Stay at a healthy weight. Get help to lose any extra pounds. Often times meeting with a dietitian can help you identify changes that can be made to your diet to help with weight loss.    Cut back on salt.  ? Limit canned, dried, packaged, and fast foods.  ? Don t add salt to your food at the table.  ? Season foods with herbs instead of salt when you cook.  ? Request no added salt when you go to a restaurant.  ? The American Heart Association (AHA) says the ideal amount of sodium is no more than 1,500 mg a day.  But because Americans eat so much salt, you can make a positive change by cutting back to even 2,300 mg of sodium a day (1 teaspoon).     Follow the DASH (Dietary Approaches to Stop Hypertension) eating plan. This plan recommends vegetables, fruits, whole gains, and other heart healthy foods.    Eat food rich in potassium.    Begin an exercise program. Ask your healthcare provider how to get started. The AHA recommends aerobic exercise 3 to 4 times a week for an average of 40 minutes at a time to lower blood pressure, with your provider's approval. Simple activities such as walking or gardening can help.    Break the smoking habit. Enroll in a stop-smoking program to improve your chances of success. Ask your healthcare provider about programs and medicines to help you stop smoking.    Limit drinks that contain caffeine such as coffee, black or green tea, and cola to 2 per day.    Never take stimulants such as amphetamines or cocaine. These drugs can be deadly for someone with high blood pressure.    Control your stress. Learn ways to manage stress.    Limit  alcohol to no more than 1 drink a day for women and 2 drinks a day for men.    Follow-up care  Make a follow-up appointment as directed.  When to call your healthcare provider  Call your healthcare provider right away if you have any of the following:    Chest pain or shortness of breath ( call 911)    Moderate to severe headache    Weakness in the muscles of your face, arms, or legs    Trouble speaking    Extreme drowsiness    Confusion    Fainting or dizziness    Pulsating or rushing sound in your ears    Unexplained nosebleed    Weakness, tingling, or numbness of your face, arms, or legs    Change in vision    Blood pressure measured at home that is greater than 180/110  [employer] last reviewed this educational content on 8/1/2019 2000-2020 [employer]. [address] [place] [address], [place] [address]. All rights reserved. This information is not intended as a substitute for professional medical care. Always follow your healthcare professional's instructions.           Patient Education     Controlling High Blood Pressure   High blood pressure (hypertension) is often called the silent killer. This is because many people who have it, don t know it. It can be very dangerous. High blood pressure can raise your risk of heart attack, stroke, heart disease, and heart failure. Controlling your blood pressure can decrease your risk of these problems. It's important to know the appropriate blood pressure range and remember to check your blood pressure regularly. Doing so can save your life.   Blood pressure measurements are given as 2 numbers. Systolic blood pressure is the upper number. This is the pressure when the heart contracts. Diastolic blood pressure is the lower number. This is the pressure when the heart relaxes between beats.   Blood pressure is categorized as normal, elevated, or stage 1 or stage 2 high blood pressure:     Normal blood pressure is systolic of less than 120 and diastolic of less than  80 (120/80)    Elevated blood pressure is systolic of 120 to 129 and diastolic less than 80    Stage 1 high blood pressure is systolic is 130 to 139 or diastolic between 80 to 89    Stage 2 high blood pressure is when systolic is 140 or higher or the diastolic is 90 or higher  A heart-healthy lifestyle can help you control your blood pressure without medicines. Here are some things you can do to pursue a heart-healthy lifestyle:     Choose heart-healthy foods     Select low-salt, low-fat foods. Limit sodium intake to 2,400 mg per day or the amount suggested by your healthcare provider.    Limit canned, dried, cured, packaged, and fast foods. These can contain a lot of salt.    Eat 8 to 10 servings of fruits and vegetables every day.    Choose lean meats, fish, or chicken.    Eat whole-grain pasta, brown rice, and beans.    Eat 2 to 3 servings of low-fat or fat-free dairy products.    Ask your doctor about the DASH eating plan. This plan helps reduce blood pressure.    When you go to a restaurant, ask that your meal be prepared with no added salt.    Stay at a healthy weight     Ask your healthcare provider how many calories to eat a day. Then stick to that number.    Ask your healthcare provider what weight range is healthiest for you. If you are overweight, a weight loss of only 3% to 5% of your body weight can help lower blood pressure. Generally, a good weight loss goal is to lose 10% of your body weight in a year.    Limit snacks and sweets.    Get regular exercise.    Get up and get active     Find activities you enjoy that can be done alone or with friends or family. Such activities might include bicycling, dancing, walking, or jogging.    Park farther away from building entrances to walk more.    Use stairs instead of the elevator.    When you can, walk or bike instead of driving.    [place] leaves, garden, or do household repairs.    Be active at a moderate to vigorous level of physical activity for at least 40  minutes for a minimum of 3 to 4 days a week.     Manage stress     Make time to relax and enjoy life. Find time to laugh.    Communicate your concerns with your loved ones and your healthcare provider.    Visit with family and friends, and keep up with hobbies.    Limit alcohol and quit smoking     Men should have no more than 2 drinks per day.    Women should have no more than 1 drink per day.    Talk with your healthcare provider about quitting smoking. Smoking significantly increases your risk for heart disease and stroke. Ask your healthcare provider about community smoking cessation programs and other options.    Medicines  If lifestyle changes aren t enough, your healthcare provider may prescribe high blood pressure medicine. Take all medicines as prescribed. If you have any questions about your medicines, ask your healthcare provider before stopping or changing them.   [employer] last reviewed this educational content on 6/1/2019 2000-2020 [employer]. [address] [place] [address], [place] [address]. All rights reserved. This information is not intended as a substitute for professional medical care. Always follow your healthcare professional's instructions.

## 2020-12-03 ENCOUNTER — ALLIED HEALTH/NURSE VISIT (OUTPATIENT)
Dept: FAMILY MEDICINE | Facility: CLINIC | Age: 55
End: 2020-12-03
Payer: COMMERCIAL

## 2020-12-03 ENCOUNTER — TELEPHONE (OUTPATIENT)
Dept: FAMILY MEDICINE | Facility: CLINIC | Age: 55
End: 2020-12-03

## 2020-12-03 VITALS — HEART RATE: 80 BPM | SYSTOLIC BLOOD PRESSURE: 130 MMHG | DIASTOLIC BLOOD PRESSURE: 88 MMHG

## 2020-12-03 DIAGNOSIS — Z01.30 BLOOD PRESSURE CHECK: Primary | ICD-10-CM

## 2020-12-03 PROCEDURE — 99207 PR DROP WITH A PROCEDURE: CPT | Mod: 25

## 2020-12-03 NOTE — TELEPHONE ENCOUNTER
Patient is here today for B/P after starting the Lisinopril 10 mg, patient says her B/P 2 weeks ago at home was 174/70. Patient is asymptomatic today, only reports that she is tired in the afternoon after taking the Lisinopril. Working on reducing salt in her diet.    B/P today is 134/88, 130/88, pulse is 80.    Will route telephone encounter to PCP to review.    ISHAN Carey

## 2020-12-03 NOTE — TELEPHONE ENCOUNTER
Plan from 11/9/2020 virtual visit:  Benign essential hypertension  Start lisinopril 10 mg daily and recheck with RN on blood pressure and needs BMP in 2-3 weeks  Ordered labs and medication locally to [facility]  - lisinopril (ZESTRIL) 10 MG tablet; Take 1 tablet (10 mg) by mouth daily  - Basic metabolic panel; Future    Looks like the BMP is still future so it doesn't appear she did her lab today, please notify patient she needs to have lab done.     BP Readings from Last 6 Encounters:   12/03/20 130/88   09/09/20 (!) 161/87   08/17/20 (!) 147/89   08/12/20 (!) 149/94   10/29/19 (!) 154/69   07/08/19 124/84       Blood pressure much better today, continue current dose of lisinopril provided blood work is good.    Cydney Garcia M.D.

## 2020-12-03 NOTE — TELEPHONE ENCOUNTER
Dr. Garcia wanted patient to check her BMP lab, patient was called and left message, need this before she can advise on the Lisinopril.    ISHAN Carey

## 2020-12-03 NOTE — TELEPHONE ENCOUNTER
Patient called back, she is scheduled for lab on 12-, told patient to continue on current dose of Lisinopril  till lab is completed.    ISHAN Carey     No

## 2020-12-03 NOTE — NURSING NOTE
Dr. Garcia wanted patient to check her BMP lab, patient was called and left message, need this before she can advise on the Lisinopril.      Added this to current telephone encounter.      ISHAN Carey

## 2020-12-06 ENCOUNTER — HEALTH MAINTENANCE LETTER (OUTPATIENT)
Age: 55
End: 2020-12-06

## 2020-12-18 ENCOUNTER — HOSPITAL ENCOUNTER (OUTPATIENT)
Dept: MAMMOGRAPHY | Facility: CLINIC | Age: 55
Discharge: HOME | End: 2020-12-18
Attending: INTERNAL MEDICINE | Admitting: INTERNAL MEDICINE
Payer: COMMERCIAL

## 2020-12-18 DIAGNOSIS — I10 BENIGN ESSENTIAL HYPERTENSION: ICD-10-CM

## 2020-12-18 DIAGNOSIS — Z12.31 VISIT FOR SCREENING MAMMOGRAM: ICD-10-CM

## 2020-12-18 LAB
ANION GAP SERPL CALCULATED.3IONS-SCNC: 7 MMOL/L (ref 3–14)
BUN SERPL-MCNC: 18 MG/DL (ref 7–30)
CALCIUM SERPL-MCNC: 9.9 MG/DL (ref 8.5–10.1)
CHLORIDE SERPL-SCNC: 106 MMOL/L (ref 94–109)
CO2 SERPL-SCNC: 25 MMOL/L (ref 20–32)
CREAT SERPL-MCNC: 0.77 MG/DL (ref 0.52–1.04)
GFR SERPL CREATININE-BSD FRML MDRD: 85 ML/MIN/{1.73_M2}
GLUCOSE SERPL-MCNC: 124 MG/DL (ref 70–99)
POTASSIUM SERPL-SCNC: 4.1 MMOL/L (ref 3.4–5.3)
SODIUM SERPL-SCNC: 138 MMOL/L (ref 133–144)

## 2020-12-18 PROCEDURE — 77063 BREAST TOMOSYNTHESIS BI: CPT

## 2020-12-18 PROCEDURE — 36415 COLL VENOUS BLD VENIPUNCTURE: CPT | Performed by: FAMILY MEDICINE

## 2020-12-18 PROCEDURE — 80048 BASIC METABOLIC PNL TOTAL CA: CPT | Performed by: FAMILY MEDICINE

## 2020-12-18 RX ORDER — LISINOPRIL 10 MG/1
10 TABLET ORAL DAILY
Qty: 90 TABLET | Refills: 3 | Status: SHIPPED | OUTPATIENT
Start: 2020-12-18 | End: 2020-12-31

## 2020-12-18 NOTE — RESULT ENCOUNTER NOTE
Kidney function stable.  Continue current dose of lisinopril as blood pressure is now also well controlled.     What pharmacy would she like to have that sent to for a longer term supply?    Cydney Garcia M.D.

## 2020-12-29 DIAGNOSIS — I10 BENIGN ESSENTIAL HYPERTENSION: ICD-10-CM

## 2020-12-31 RX ORDER — LISINOPRIL 10 MG/1
10 TABLET ORAL DAILY
Qty: 90 TABLET | Refills: 3 | Status: SHIPPED | OUTPATIENT
Start: 2020-12-31 | End: 2021-11-23

## 2021-06-29 NOTE — PROGRESS NOTES
"Primary PHYSICIAN:  Cydney Garcia MD        CHIEF COMPLAINT AND REASON FOR Visit:   left breast cancer T1bN0 s/p lumpectomy 11/2014, RT 1/2015.        HISTORY OF ONCOLOGY ILLNESS:  Ann Vargas is a 48-year-old peromenopausal woman had her screening mammogram 09/2014,  found to have new microcalcification in the left upper outer quadrant of the breast around 1 o'clock position 8 cm from the nipple.  Stereotactic biopsy on the left upper outer quadrant of the breast microcalcifications sites, one is negative.  The other one turned out to be invasive ductal cancer, grade 1, negative lymphovascular invasion, no DCIS, ER/% positive, HER-2/seth is negative.  She  did not feel breast mass, no skin changes.  She had post-biopsy hematoma.  Left lumpectomy 11/19/2014 found 5 mm IDC, Grade I, 2 sLN negative, clear margin. She has pT1bN0 disease.    She finished RT 1/2015.   Tamoxifen was advised after. LMP 1/2015. It was changed to Arimidex 4/2016. She has severe muscle pain from it and d/c in 4/2018. It was changed to Aromasin.       INTERVAL HISTORY:  She has hair loss from Aromasin.          OTHER MEDICAL PROBLEMS:  Obesity, hyperlipidemia, GERD.   OCP for yrs d/c 5/2014 found to be post menopausal by blood work in 6/2014      MEDICATIONS:  Reviewed in Epic system.      SOCIAL HISTORY:  She lives in [place].  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  Denies smoking or alcohol abuse.      FAMILY HISTORY:  One great-grandmother had breast cancer, she was a survivor.  One grandmother had disease from ovarian cancer.        REVIEW OF SYSTEMS:   She is gaining weight.   She reported muscle ache and joints from AI. This is better on Aromasin.  She has hair loss from Aromasin, with pruritus on her scalp.         PHYSICAL EXAMINATION:   VITAL SIGNS: Blood pressure (!) 154/69, pulse 80, temperature 96.7  F (35.9  C), temperature source Tympanic, resp. rate 20, height 1.689 m (5' 6.5\"), weight 113.5 kg (250 lb 4.8 " "oz), last menstrual period 02/17/2015, SpO2 95 %, not currently breastfeeding.  ECOG 0    GENERAL APPEARANCE:  Middle aged woman looks like stated age.  She is a little bit anxious.  She is morbidly obese.  Not in acute distress.   HEENT: The patient is normocephalic, atraumatic. Pupils are equally reactive to light.  Sclerae are anicteric.  Moist oral mucosa.  Negative pharynx.  No oral thrush. Left iritis just saw \"total eye\".  NECK:  Supple.  No jugular venous distention.  Thyroid is not palpable.   LYMPH NODES:  Superficial lymphadenopathy is not appreciable in the bilateral cervical, supraclavicular, axillary or inguinal areas.   CARDIOVASCULAR:  S1, S2 regular with no murmurs or gallops.  No carotid or abdominal bruits.   PULMONARY:  Lungs are clear to auscultation and percussion bilaterally.  There is no wheezing or rhonchi.   GASTROINTESTINAL:  Abdomen is soft, nontender.  No hepatosplenomegaly.  No signs of ascites.  No mass appreciable.   MUSCULOSKELETAL/EXTREMITIES:  No edema.  No cyanotic changes.  No signs of joint deformity.  No lymphedema.   NEUROLOGIC:  Cranial nerves II-XII are grossly intact.  Sensation intact.  Muscle strength and muscle tone symmetrical, 5/5 throughout.   BACK:  No spinal or paraspinal tenderness.  No CVA tenderness.   SKIN:  No petechiae.  No rash.  No signs of cellulitis.   BREASTS:  Bilateral breast exam review.  Bilateral oversized symmetrical breasts.  Right side is clear.  Left side has well healed lumpectomy and sentinel LN scar.        CURRENT DATA REVIEWED  CMP/marker are good.      Current Image Reviewed  MA 7/2019 - negative      Old data reviewed with [employer]  Dexa 11/2017 - nl   Left lumpectomy 11/19/2014 found 5 mm IDC, Grade I, 2 sLN negative, clear margin. She has pT1bN0 disease.    Biopsy 10/29/2014- tereotactic biopsy on the left upper outer quadrant of the breast microcalcifications sites, one is negative.  The other one turned out to be invasive ductal cancer, " grade 1, negative lymphovascular invasion, no DCIS, ER/% positive, HER-2/seth is negative.    Dexa 12/2014: nl gone desity  Bone scan 12/2014: no mets         ASSESSMENT AND PLAN:    1.  left infiltrating ductal cancer, grade 1, ER/% positive, HER 2 negative, negative lymphovascular invasion, no DCIS, diagnosed through screening mammogram.  S/p lumpectomy 11/2014. She finished RT 1/2015.      Tamoxifen was advised after. LMP 1/2015. It was changed to Arimidex 4/2016.   She had severe muscle pain from it and d/c in 4/2018.   It was changed to Aromasin after her symptoms resolved.     We discussed the longer anti hormone data. She is open either way.   She can hold Aromasin to see if her thinning of hair and pruritis on her scalp improve.  If so,  based on her low risk features of her breast cancer I think 5 years of AI is reasonable.  If her symptoms does not change she can finished up 5 years of AI in 2020.  She is due 12 months f/u with labs and MA.        2. Weight gain.   Weight loss counseling is provided.   She is informed post menopausal obesity is a risk factor for breast cancer.   Weight loss will her joints symptoms too.     3.  Thinning of hair from AI.  Advised her to try oral Biotene and Rogaine shampoo          Simple / Intermediate / Complex Repair - Final Wound Length In Cm: 0

## 2021-09-25 ENCOUNTER — HEALTH MAINTENANCE LETTER (OUTPATIENT)
Age: 56
End: 2021-09-25

## 2021-11-21 DIAGNOSIS — I10 BENIGN ESSENTIAL HYPERTENSION: ICD-10-CM

## 2021-11-23 RX ORDER — LISINOPRIL 10 MG/1
TABLET ORAL
Qty: 90 TABLET | Refills: 0 | Status: SHIPPED | OUTPATIENT
Start: 2021-11-23 | End: 2022-02-18

## 2022-01-04 ENCOUNTER — TRANSFERRED RECORDS (OUTPATIENT)
Dept: HEALTH INFORMATION MANAGEMENT | Facility: CLINIC | Age: 57
End: 2022-01-04
Payer: COMMERCIAL

## 2022-01-15 ENCOUNTER — HEALTH MAINTENANCE LETTER (OUTPATIENT)
Age: 57
End: 2022-01-15

## 2022-02-21 DIAGNOSIS — J30.9 CHRONIC ALLERGIC RHINITIS: ICD-10-CM

## 2022-02-21 RX ORDER — MONTELUKAST SODIUM 10 MG/1
1 TABLET ORAL AT BEDTIME
Qty: 60 TABLET | Refills: 0 | Status: SHIPPED | OUTPATIENT
Start: 2022-02-21 | End: 2022-03-31

## 2022-02-21 NOTE — TELEPHONE ENCOUNTER
Pt is OUT of [facility] med and needs 90 day supply refill to get her through until upcoming appt 3/31  No need to call patient back, unless there are questions or problems.

## 2022-03-18 ENCOUNTER — HOSPITAL ENCOUNTER (OUTPATIENT)
Dept: MAMMOGRAPHY | Facility: CLINIC | Age: 57
Discharge: HOME | End: 2022-03-18
Attending: FAMILY MEDICINE | Admitting: FAMILY MEDICINE
Payer: COMMERCIAL

## 2022-03-18 DIAGNOSIS — Z12.31 VISIT FOR SCREENING MAMMOGRAM: ICD-10-CM

## 2022-03-18 PROCEDURE — 77067 SCR MAMMO BI INCL CAD: CPT

## 2022-03-24 ENCOUNTER — HOSPITAL ENCOUNTER (OUTPATIENT)
Dept: OUTPATIENT PROCEDURES | Facility: CLINIC | Age: 57
Discharge: HOME | End: 2022-03-24
Attending: OPHTHALMOLOGY | Admitting: OPHTHALMOLOGY
Payer: COMMERCIAL

## 2022-03-24 PROCEDURE — 66821 AFTER CATARACT LASER SURGERY: CPT | Mod: LT | Performed by: OPHTHALMOLOGY

## 2022-03-29 ASSESSMENT — ENCOUNTER SYMPTOMS
EYE PAIN: 0
COUGH: 0
ABDOMINAL PAIN: 0
DYSURIA: 0
JOINT SWELLING: 0
NAUSEA: 0
MYALGIAS: 0
PALPITATIONS: 0
CHILLS: 0
NERVOUS/ANXIOUS: 0
HEMATOCHEZIA: 0
PARESTHESIAS: 0
SHORTNESS OF BREATH: 0
DIARRHEA: 0
SORE THROAT: 0
BREAST MASS: 0
FEVER: 0
ARTHRALGIAS: 0
HEADACHES: 0
HEARTBURN: 0
CONSTIPATION: 0
HEMATURIA: 0
FREQUENCY: 0
DIZZINESS: 0
WEAKNESS: 0

## 2022-03-31 ENCOUNTER — OFFICE VISIT (OUTPATIENT)
Dept: FAMILY MEDICINE | Facility: CLINIC | Age: 57
End: 2022-03-31
Payer: COMMERCIAL

## 2022-03-31 VITALS
DIASTOLIC BLOOD PRESSURE: 82 MMHG | HEIGHT: 66 IN | OXYGEN SATURATION: 96 % | WEIGHT: 265.5 LBS | RESPIRATION RATE: 16 BRPM | SYSTOLIC BLOOD PRESSURE: 120 MMHG | TEMPERATURE: 98.4 F | BODY MASS INDEX: 42.67 KG/M2 | HEART RATE: 79 BPM

## 2022-03-31 DIAGNOSIS — Z28.21 INFLUENZA VACCINE REFUSED: ICD-10-CM

## 2022-03-31 DIAGNOSIS — J30.9 CHRONIC ALLERGIC RHINITIS: ICD-10-CM

## 2022-03-31 DIAGNOSIS — I10 BENIGN ESSENTIAL HYPERTENSION: ICD-10-CM

## 2022-03-31 DIAGNOSIS — T78.2XXS ANAPHYLAXIS, SEQUELA: ICD-10-CM

## 2022-03-31 DIAGNOSIS — Z12.4 SCREENING FOR CERVICAL CANCER: ICD-10-CM

## 2022-03-31 DIAGNOSIS — Z28.21 COVID-19 VACCINE DOSE DECLINED: ICD-10-CM

## 2022-03-31 DIAGNOSIS — E66.01 MORBID OBESITY (H): ICD-10-CM

## 2022-03-31 DIAGNOSIS — Z00.00 ROUTINE GENERAL MEDICAL EXAMINATION AT A HEALTH CARE FACILITY: Primary | ICD-10-CM

## 2022-03-31 DIAGNOSIS — K21.9 GASTROESOPHAGEAL REFLUX DISEASE WITHOUT ESOPHAGITIS: ICD-10-CM

## 2022-03-31 DIAGNOSIS — E78.5 HYPERLIPIDEMIA LDL GOAL <130: ICD-10-CM

## 2022-03-31 DIAGNOSIS — Z12.11 COLON CANCER SCREENING: ICD-10-CM

## 2022-03-31 LAB
ALBUMIN SERPL-MCNC: 3.5 G/DL (ref 3.4–5)
ALP SERPL-CCNC: 107 U/L (ref 40–150)
ALT SERPL W P-5'-P-CCNC: 73 U/L (ref 0–50)
ANION GAP SERPL CALCULATED.3IONS-SCNC: 6 MMOL/L (ref 3–14)
AST SERPL W P-5'-P-CCNC: 41 U/L (ref 0–45)
BILIRUB SERPL-MCNC: 0.3 MG/DL (ref 0.2–1.3)
BUN SERPL-MCNC: 11 MG/DL (ref 7–30)
CALCIUM SERPL-MCNC: 10.2 MG/DL (ref 8.5–10.1)
CHLORIDE BLD-SCNC: 108 MMOL/L (ref 94–109)
CHOLEST SERPL-MCNC: 212 MG/DL
CO2 SERPL-SCNC: 27 MMOL/L (ref 20–32)
CREAT SERPL-MCNC: 0.72 MG/DL (ref 0.52–1.04)
FASTING STATUS PATIENT QL REPORTED: NO
GFR SERPL CREATININE-BSD FRML MDRD: >90 ML/MIN/1.73M2
GLUCOSE BLD-MCNC: 101 MG/DL (ref 70–99)
HBA1C MFR BLD: 5.4 % (ref 0–5.6)
HDLC SERPL-MCNC: 62 MG/DL
LDLC SERPL CALC-MCNC: 82 MG/DL
NONHDLC SERPL-MCNC: 150 MG/DL
POTASSIUM BLD-SCNC: 4.1 MMOL/L (ref 3.4–5.3)
PROT SERPL-MCNC: 7.7 G/DL (ref 6.8–8.8)
SODIUM SERPL-SCNC: 141 MMOL/L (ref 133–144)
TRIGL SERPL-MCNC: 338 MG/DL

## 2022-03-31 PROCEDURE — 99396 PREV VISIT EST AGE 40-64: CPT | Mod: 25 | Performed by: FAMILY MEDICINE

## 2022-03-31 PROCEDURE — 80053 COMPREHEN METABOLIC PANEL: CPT | Performed by: FAMILY MEDICINE

## 2022-03-31 PROCEDURE — 36415 COLL VENOUS BLD VENIPUNCTURE: CPT | Performed by: FAMILY MEDICINE

## 2022-03-31 PROCEDURE — 90471 IMMUNIZATION ADMIN: CPT | Performed by: FAMILY MEDICINE

## 2022-03-31 PROCEDURE — 83036 HEMOGLOBIN GLYCOSYLATED A1C: CPT | Performed by: FAMILY MEDICINE

## 2022-03-31 PROCEDURE — G0145 SCR C/V CYTO,THINLAYER,RESCR: HCPCS | Performed by: FAMILY MEDICINE

## 2022-03-31 PROCEDURE — 87624 HPV HI-RISK TYP POOLED RSLT: CPT | Performed by: FAMILY MEDICINE

## 2022-03-31 PROCEDURE — 90715 TDAP VACCINE 7 YRS/> IM: CPT | Performed by: FAMILY MEDICINE

## 2022-03-31 PROCEDURE — 80061 LIPID PANEL: CPT | Performed by: FAMILY MEDICINE

## 2022-03-31 PROCEDURE — 99214 OFFICE O/P EST MOD 30 MIN: CPT | Mod: 25 | Performed by: FAMILY MEDICINE

## 2022-03-31 RX ORDER — LISINOPRIL 10 MG/1
10 TABLET ORAL DAILY
Qty: 90 TABLET | Refills: 3 | Status: SHIPPED | OUTPATIENT
Start: 2022-03-31 | End: 2023-04-24

## 2022-03-31 RX ORDER — MONTELUKAST SODIUM 10 MG/1
1 TABLET ORAL AT BEDTIME
Qty: 90 TABLET | Refills: 3 | Status: SHIPPED | OUTPATIENT
Start: 2022-03-31 | End: 2023-03-28

## 2022-03-31 RX ORDER — EPINEPHRINE 0.3 MG/.3ML
0.3 INJECTION SUBCUTANEOUS PRN
Qty: 0.6 ML | Refills: 1 | Status: SHIPPED | OUTPATIENT
Start: 2022-03-31 | End: 2023-09-13

## 2022-03-31 RX ORDER — SIMVASTATIN 40 MG
TABLET ORAL
Qty: 90 TABLET | Refills: 3 | Status: SHIPPED | OUTPATIENT
Start: 2022-03-31 | End: 2023-03-28

## 2022-03-31 RX ORDER — OMEPRAZOLE 40 MG/1
CAPSULE, DELAYED RELEASE ORAL
Qty: 90 CAPSULE | Refills: 3 | Status: SHIPPED | OUTPATIENT
Start: 2022-03-31 | End: 2023-03-28

## 2022-03-31 ASSESSMENT — ENCOUNTER SYMPTOMS
WEAKNESS: 0
EYE PAIN: 0
DYSURIA: 0
HEMATURIA: 0
SHORTNESS OF BREATH: 0
NERVOUS/ANXIOUS: 0
COUGH: 0
PALPITATIONS: 0
FEVER: 0
PARESTHESIAS: 0
SORE THROAT: 0
MYALGIAS: 0
HEMATOCHEZIA: 0
DIZZINESS: 0
CONSTIPATION: 0
CHILLS: 0
NAUSEA: 0
FREQUENCY: 0
JOINT SWELLING: 0
HEARTBURN: 0
BREAST MASS: 0
ARTHRALGIAS: 0
DIARRHEA: 0
HEADACHES: 0
ABDOMINAL PAIN: 0

## 2022-03-31 ASSESSMENT — PAIN SCALES - GENERAL: PAINLEVEL: NO PAIN (0)

## 2022-03-31 NOTE — PATIENT INSTRUCTIONS
"  SHINGLES VACCINE/SHINGRIX  If you had chicken pox as a child, you are at risk of shingles (a painful rash with blisters that can sometimes lead to chronic nerve pain or blindness if it affects your eye).    The new Shingrix vaccine is supposed to be more effective at preventing shingles (98%).  Even if you had Zostavax (the original shingles vaccine), this is recommended. I encourage people to check with their pharmacy (or ours) and have them run it through to check the cost.  It's often better covered through your pharmacy benefit.      It is a two part vaccine series - one now and one in 2-6 months.  Many people will feel a bit \"flu like\" with fever and body aches for a few days after the injection.  Taking ibuprofen can help with this.      Preventive Health Recommendations  Female Ages 50 - 64    Yearly exam: See your health care provider every year in order to  o Review health changes.   o Discuss preventive care.    o Review your medicines if your doctor has prescribed any.      Get a Pap test every three years (unless you have an abnormal result and your provider advises testing more often).    If you get Pap tests with HPV test, you only need to test every 5 years, unless you have an abnormal result.     You do not need a Pap test if your uterus was removed (hysterectomy) and you have not had cancer.    You should be tested each year for STDs (sexually transmitted diseases) if you're at risk.     Have a mammogram every 1 to 2 years.    Have a colonoscopy at age 50, or have a yearly FIT test (stool test). These exams screen for colon cancer.      Have a cholesterol test every 5 years, or more often if advised.    Have a diabetes test (fasting glucose) every three years. If you are at risk for diabetes, you should have this test more often.     If you are at risk for osteoporosis (brittle bone disease), think about having a bone density scan (DEXA).    Shots: Get a flu shot each year. Get a tetanus shot " every 10 years.    Nutrition:     Eat at least 5 servings of fruits and vegetables each day.    Eat whole-grain bread, whole-wheat pasta and brown rice instead of white grains and rice.    Get adequate Calcium and Vitamin D.     Lifestyle    Exercise at least 150 minutes a week (30 minutes a day, 5 days a week). This will help you control your weight and prevent disease.    Limit alcohol to one drink per day.    No smoking.     Wear sunscreen to prevent skin cancer.     See your dentist every six months for an exam and cleaning.    See your eye doctor every 1 to 2 years.

## 2022-03-31 NOTE — PROGRESS NOTES
SUBJECTIVE:   CC: Ann Vargas is an 56 year old woman who presents for preventive health visit.       Patient has been advised of split billing requirements and indicates understanding: Yes  Healthy Habits:     Getting at least 3 servings of Calcium per day:  Yes    Bi-annual eye exam:  Yes    Dental care twice a year:  NO    Sleep apnea or symptoms of sleep apnea:  None    Diet:  Regular (no restrictions)    Frequency of exercise:  1 day/week    Duration of exercise:  Less than 15 minutes    Taking medications regularly:  Yes    Medication side effects:  None    PHQ-2 Total Score: 0    Additional concerns today:  No      Hyperlipidemia Follow-Up      Are you regularly taking any medication or supplement to lower your cholesterol?   Yes- simvastatin    Are you having muscle aches or other side effects that you think could be caused by your cholesterol lowering medication?  No    Hypertension Follow-up      Do you check your blood pressure regularly outside of the clinic? No     Are you following a low salt diet? Yes tries to    Are your blood pressures ever more than 140 on the top number (systolic) OR more   than 90 on the bottom number (diastolic), for example 140/90? Unknown due to not checking      Will update Tdap today.  Will do cologuard    Today's PHQ-2 Score:   PHQ-2 ( 1999 Pfizer) 3/29/2022   Q1: Little interest or pleasure in doing things 0   Q2: Feeling down, depressed or hopeless 0   PHQ-2 Score 0   PHQ-2 Total Score (12-17 Years)- Positive if 3 or more points; Administer PHQ-A if positive -   Q1: Little interest or pleasure in doing things Not at all   Q2: Feeling down, depressed or hopeless Not at all   PHQ-2 Score 0       Abuse: Current or Past (Physical, Sexual or Emotional) - No  Do you feel safe in your environment? Yes    Have you ever done Advance Care Planning? (For example, a Health Directive, POLST, or a discussion with a medical provider or your loved ones about your wishes): No,  advance care planning information given to patient to review.  Patient declined advance care planning discussion at this time.    Social History     Tobacco Use     Smoking status: Never Smoker     Smokeless tobacco: Never Used   Substance Use Topics     Alcohol use: Yes     Alcohol/week: 0.0 standard drinks     Comment: -monthly         Alcohol Use 3/29/2022   Prescreen: >3 drinks/day or >7 drinks/week? No   Prescreen: >3 drinks/day or >7 drinks/week? -       Reviewed orders with patient.  Reviewed health maintenance and updated orders accordingly - Yes  Lab work is in process    Breast Cancer Screening:  Any new diagnosis of family breast, ovarian, or bowel cancer? No    FHS-7:   Breast CA Risk Assessment (FHS-7) 3/18/2022   Did any of your first-degree relatives have breast or ovarian cancer? No   Did any of your relatives have bilateral breast cancer? No   Did any man in your family have breast cancer? No   Did any woman in your family have breast and ovarian cancer? No   Did any woman in your family have breast cancer before age 50 y? No   Do you have 2 or more relatives with breast and/or ovarian cancer? No   Do you have 2 or more relatives with breast and/or bowel cancer? No       Mammogram Screening: Recommended annual mammography  Pertinent mammograms are reviewed under the imaging tab.    History of abnormal Pap smear: NO - age 30-65 PAP every 5 years with negative HPV co-testing recommended  PAP / HPV Latest Ref Rng & Units 4/30/2018 12/17/2015 11/7/2011   PAP (Historical) - NIL OTHER-NIL, See Result NIL   HPV16 NEG:Negative Negative Negative -   HPV18 NEG:Negative Negative Negative -   HRHPV NEG:Negative Negative Negative -     Reviewed and updated as needed this visit by clinical staff   Tobacco  Allergies  Meds   Med Hx  Surg Hx  Fam Hx  Soc Hx        Reviewed and updated as needed this visit by Provider                     Review of Systems   Constitutional: Negative for chills and fever.  "  HENT: Negative for congestion, ear pain, hearing loss and sore throat.    Eyes: Negative for pain and visual disturbance.   Respiratory: Negative for cough and shortness of breath.    Cardiovascular: Negative for chest pain, palpitations and peripheral edema.   Gastrointestinal: Negative for abdominal pain, constipation, diarrhea, heartburn, hematochezia and nausea.   Breasts:  Negative for tenderness, breast mass and discharge.   Genitourinary: Negative for dysuria, frequency, genital sores, hematuria, pelvic pain, urgency, vaginal bleeding and vaginal discharge.   Musculoskeletal: Negative for arthralgias, joint swelling and myalgias.   Skin: Negative for rash.   Neurological: Negative for dizziness, weakness, headaches and paresthesias.   Psychiatric/Behavioral: Negative for mood changes. The patient is not nervous/anxious.           OBJECTIVE:   /82 (BP Location: Right arm, Patient Position: Chair, Cuff Size: Adult Large)   Pulse 79   Temp 98.4  F (36.9  C) (Tympanic)   Resp 16   Ht 1.676 m (5' 6\")   Wt 120.4 kg (265 lb 8 oz)   LMP 02/17/2015   SpO2 96%   BMI 42.85 kg/m    Physical Exam  GENERAL APPEARANCE: healthy, alert and no distress  EYES: Eyes grossly normal to inspection, PERRL and conjunctivae and sclerae normal  HENT: ear canals and TM's normal, nose and mouth without ulcers or lesions, oropharynx clear and oral mucous membranes moist  NECK: no adenopathy, no asymmetry, masses, or scars and thyroid normal to palpation  RESP: lungs clear to auscultation - no rales, rhonchi or wheezes  BREAST: normal without masses, tenderness or nipple discharge and no palpable axillary masses or adenopathy  CV: regular rate and rhythm, normal S1 S2, no S3 or S4, no murmur, click or rub, no peripheral edema and peripheral pulses strong  ABDOMEN: soft, nontender, no hepatosplenomegaly, no masses and bowel sounds normal   (female): normal female external genitalia, normal urethral meatus, vaginal mucosal " atrophy noted, normal cervix, adnexae, and uterus without masses. and pap obtained  MS: no musculoskeletal defects are noted and gait is age appropriate without ataxia  SKIN: no suspicious lesions or rashes  NEURO: Normal strength and tone, sensory exam grossly normal, mentation intact and speech normal  PSYCH: mentation appears normal and affect normal/bright    Diagnostic Test Results:  Labs reviewed in Epic  No results found for this or any previous visit (from the past 24 hour(s)).    ASSESSMENT/PLAN:   (Z00.00) Routine general medical examination at a health care facility  (primary encounter diagnosis)  Comment:    Plan: Hemoglobin A1c             (I10) Benign essential hypertension  Comment: well controlled  Plan: lisinopril (ZESTRIL) 10 MG tablet,         Comprehensive metabolic panel (BMP + Alb, Alk         Phos, ALT, AST, Total. 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  Plan: EPINEPHrine (ADRENACLICK) 0.3 MG/0.3ML         injection 2-pack             (Z28.21) COVID-19 vaccine dose declined  Comment:    Plan: risks discussed    (Z28.21) Influenza vaccine refused  Comment:   Plan: risks disucssed    (Z12.4) Screening for cervical cancer  Comment: pap obtained  Plan: PAP screen with HPV - recommended age 30 - 65         years             (Z12.11) Colon cancer screening  Comment:    Plan: COLOGUARD([employer])             (E66.01) Morbid obesity (H)  Comment:    Plan: Known issue that I take into account for their medical decisions, no current exacerbations or new concerns      Patient has  "been advised of split billing requirements and indicates understanding: Yes    COUNSELING:  Reviewed preventive health counseling, as reflected in patient instructions       Regular exercise       Healthy diet/nutrition    Estimated body mass index is 42.85 kg/m  as calculated from the following:    Height as of this encounter: 1.676 m (5' 6\").    Weight as of this encounter: 120.4 kg (265 lb 8 oz).    Weight management plan: Discussed healthy diet and exercise guidelines    She reports that she has never smoked. She has never used smokeless tobacco.      Counseling Resources:  ATP IV Guidelines  Pooled Cohorts Equation Calculator  Breast Cancer Risk Calculator  BRCA-Related Cancer Risk Assessment: FHS-7 Tool  FRAX Risk Assessment  ICSI Preventive Guidelines  Dietary Guidelines for Americans, 2010  USDA's MyPlate  ASA Prophylaxis  Lung CA Screening    Cydney Garcia MD  [facility]  "

## 2022-04-04 LAB
BKR LAB AP GYN ADEQUACY: NORMAL
BKR LAB AP GYN INTERPRETATION: NORMAL
BKR LAB AP HPV REFLEX: NORMAL
BKR LAB AP PREVIOUS ABNORMAL: NORMAL
PATH REPORT.COMMENTS IMP SPEC: NORMAL
PATH REPORT.COMMENTS IMP SPEC: NORMAL
PATH REPORT.RELEVANT HX SPEC: NORMAL

## 2022-04-06 LAB
HUMAN PAPILLOMA VIRUS 16 DNA: NEGATIVE
HUMAN PAPILLOMA VIRUS 18 DNA: NEGATIVE
HUMAN PAPILLOMA VIRUS FINAL DIAGNOSIS: NORMAL
HUMAN PAPILLOMA VIRUS OTHER HR: NEGATIVE

## 2022-04-18 ENCOUNTER — HOSPITAL ENCOUNTER (OUTPATIENT)
Dept: OUTPATIENT PROCEDURES | Facility: CLINIC | Age: 57
Discharge: HOME | End: 2022-04-18
Attending: OPHTHALMOLOGY | Admitting: OPHTHALMOLOGY
Payer: COMMERCIAL

## 2022-04-18 PROCEDURE — 66821 AFTER CATARACT LASER SURGERY: CPT | Mod: RT | Performed by: OPHTHALMOLOGY

## 2022-06-27 LAB — COLOGUARD-ABSTRACT: NEGATIVE

## 2022-08-03 NOTE — PATIENT INSTRUCTIONS
Thank you for choosing [facility].  You may be receiving an email and/or telephone survey request from [facility] Customer Experience regarding your visit today.  Please take a few minutes to respond to the survey to let us know how we are doing.      If you have questions or concerns, please contact us via [employer] or you can contact your care team at [phone].    Our Clinic hours are:  Monday 6:40 am  to 7:00 pm  Tuesday -Friday 6:40 am to 5:00 pm    The [place] outpatient lab hours are:  Monday - Friday 6:10 am to 4:45 pm  Saturdays 7:00 am to 11:00 am  Appointments are required, call [phone]    If you have clinical questions after hours or would like to schedule an appointment,  call the clinic at [phone].    Patient Education     Bronchitis, Antibiotic Treatment (Adult)    Bronchitis is an infection of the air passages (bronchial tubes) in your lungs. It often occurs when you have a cold. This illness is contagious during the first few days and is spread through the air by coughing and sneezing, or by direct contact (touching the sick person and then touching your own eyes, nose, or mouth).  Symptoms of bronchitis include cough with mucus (phlegm) and low-grade fever. Bronchitis usually lasts 7 to 14 days. Mild cases can be treated with simple home remedies. More severe infection is treated with an antibiotic.  Home care  Follow these guidelines when caring for yourself at home:    If your symptoms are severe, rest at home for the first 2 to 3 days. When you go back to your usual activities, don't let yourself get too tired.    Don't smoke. Also stay away from secondhand smoke.    You may use over-the-counter medicines to control fever or pain, unless another medicine was prescribed. If you have chronic liver or kidney disease or have ever had a stomach ulcer or gastrointestinal bleeding, talk with your healthcare provider before using these medicines. Also talk to your provider if you  are taking medicine to prevent blood clots. Aspirin should never be given to anyone younger than 18 who is ill with a viral infection or fever. It may cause severe liver or brain damage.    Your appetite may be low, so a light diet is fine. Stay well hydrated by drinking 6 to 8 glasses of fluids per day. This includes water, soft drinks, sports drinks, juices, tea, or soup. Extra fluids will help loosen mucus in your nose and lungs.    Over-the-counter cough, cold, and sore-throat medicines will not shorten the length of the illness, but they may be helpful to reduce your symptoms. Don't use decongestants if you have high blood pressure.    Finish all antibiotic medicine. Do this even if you are feeling better after only a few days.  Follow-up care  Follow up with your healthcare provider, or as advised. If you had an X-ray or ECG (electrocardiogram), a specialist will review it. You will be told of any new test results that may affect your care.  If you are age 65 or older, if you smoke, or if you have a chronic lung disease or condition that affects your immune system, ask your healthcare provider about getting a pneumococcal vaccine and a yearly flu shot (influenza vaccine).  When to seek medical advice  Call your healthcare provider right away if any of these occur:    Fever of 100.4 F (38 C) or higher, or as directed by your healthcare provider    Coughing up more sputum    Weakness, drowsiness, headache, facial pain, ear pain, or a stiff neck     Call 911  Call 911 if any of these occur.    Coughing up blood    Weakness, drowsiness, headache, or stiff neck that get worse    Trouble breathing, wheezing, or pain with breathing   Date Last Reviewed: 6/1/2018 2000-2018 [employer]. [address] [place] [address], [place] [address]. All rights reserved. This information is not intended as a substitute for professional medical care. Always follow your healthcare professional's instructions.            Tissue Cultured Epidermal Autograft Text: The defect edges were debeveled with a #15 scalpel blade.  Given the location of the defect, shape of the defect and the proximity to free margins a tissue cultured epidermal autograft was deemed most appropriate.  The graft was then trimmed to fit the size of the defect.  The graft was then placed in the primary defect and oriented appropriately. [phone]

## 2022-12-26 ENCOUNTER — HEALTH MAINTENANCE LETTER (OUTPATIENT)
Age: 57
End: 2022-12-26

## 2023-01-08 ENCOUNTER — HOSPITAL ENCOUNTER (EMERGENCY)
Facility: CLINIC | Age: 58
Discharge: HOME | End: 2023-01-08
Attending: PHYSICIAN ASSISTANT | Admitting: PHYSICIAN ASSISTANT
Payer: COMMERCIAL

## 2023-01-08 ENCOUNTER — APPOINTMENT (OUTPATIENT)
Dept: GENERAL RADIOLOGY | Facility: CLINIC | Age: 58
End: 2023-01-08
Attending: PHYSICIAN ASSISTANT
Payer: COMMERCIAL

## 2023-01-08 VITALS
RESPIRATION RATE: 18 BRPM | HEART RATE: 121 BPM | BODY MASS INDEX: 41.97 KG/M2 | OXYGEN SATURATION: 96 % | SYSTOLIC BLOOD PRESSURE: 167 MMHG | WEIGHT: 260 LBS | TEMPERATURE: 97.6 F | DIASTOLIC BLOOD PRESSURE: 77 MMHG

## 2023-01-08 DIAGNOSIS — S92.501A CLOSED FRACTURE OF PHALANX OF RIGHT THIRD TOE, INITIAL ENCOUNTER: ICD-10-CM

## 2023-01-08 PROCEDURE — 28510 TREATMENT OF TOE FRACTURE: CPT | Mod: 54 | Performed by: PHYSICIAN ASSISTANT

## 2023-01-08 PROCEDURE — 99213 OFFICE O/P EST LOW 20 MIN: CPT | Mod: 57 | Performed by: PHYSICIAN ASSISTANT

## 2023-01-08 PROCEDURE — 73630 X-RAY EXAM OF FOOT: CPT | Mod: RT

## 2023-01-08 PROCEDURE — G0463 HOSPITAL OUTPT CLINIC VISIT: HCPCS | Mod: 25 | Performed by: PHYSICIAN ASSISTANT

## 2023-01-08 PROCEDURE — 28510 TREATMENT OF TOE FRACTURE: CPT | Mod: T7 | Performed by: PHYSICIAN ASSISTANT

## 2023-01-08 NOTE — DISCHARGE INSTRUCTIONS
Ice, rest, elevate, Tylenol and ibuprofen over-the-counter as needed as long as no allergies or contraindications    Buddy tape second and third toes together and wear postop shoe for the next few weeks.  Podiatry referral placed for further evaluation management    Recommend follow-up with either primary care doctor or podiatry in 2 weeks.

## 2023-01-08 NOTE — ED PROVIDER NOTES
History     Chief Complaint   Patient presents with     Foot Pain     HPI  Ann Vargas is a 57 year old female who sustained a right foot injury 1 weeks ago.   Mechanism of injury: she hit her foot on the edge of the tube 1 week ago with pain, bruising, and swelling.   Immediate symptoms: immediate pain, immediate swelling, was able to bear weight directly after injury, no deformity was noted by the patient.   Symptoms have been sudden, unchanged since that time.   Prior history of related problems: no prior problems with this area in the past.  Patient denies numbness, skin abrasion, or ankle pain    Problem list, Medication list, Allergies, and Medical/Social/Surgical histories reviewed in [facility] and updated as appropriate.      Allergies:  Allergies   Allergen Reactions     Yellow Dye Anaphylaxis     Yellow dye #5/#6        Problem List:    Patient Active Problem List    Diagnosis Date Noted     Benign essential hypertension 11/09/2020     Priority: Medium     Morbid obesity (H) 12/17/2015     Priority: Medium     History of left breast cancer 09/29/2015     Priority: Medium     Malignant neoplasm of left female breast (HCC) 11/25/2014     Priority: Medium     left infiltrating ductal cancer, grade 1, ER/% positive, HER 2 negative, negative lymphovascular invasion, no DCIS, diagnosed through screening mammogram due to new microcalcification in a 48-year-old otherwise quite healthy postmenopausal woman. S/p lumpectomy 11/2014 found 5 mm GI, IDC, 2LN negative, with clear margin. She has pT1bN0 disease.   She finished RT 1/2015.   Due to her premenopausal state, tamoxifen is the only antihormone option for her in adjuvant setting to prevent the recurrence. We talked about the side effects, how she should be monitored during this intervention. The 5 yrs versus 10 yrs data with it. She made informed decision to proceed. This was started in 2/2015.        Breast cancer (H) 10/29/2014     Priority: Medium      Iritis, recurrent 2011     Priority: Medium     Hyperlipidemia LDL goal <130 10/31/2010     Priority: Medium     Initially on Vytorin       Gastroesophageal reflux disease without esophagitis 2009     Priority: Medium     Chronic rhinitis 10/23/2006     Priority: Medium     10/23/2006  Had been on shots--now on singulair  Working well          Past Medical History:    Past Medical History:   Diagnosis Date     Benign essential hypertension 2020     Breast cancer (H)      Chronic rhinitis 10/23/2006     Complication of anesthesia      Diagnostic skin and sensitization tests (aka ALLERGENS)      GERD (gastroesophageal reflux disease)      Hyperlipidemia      PONV (postoperative nausea and vomiting)      Unexplained endometrial cells on cervical cytology 12/17/15       Past Surgical History:    Past Surgical History:   Procedure Laterality Date     BIOPSY BREAST       C/SECTION, CLASSICAL  Aug 93 &     , Classical x 2     EYE SURGERY  ?    detachted retina surgery     LUMPECTOMY BREAST       LUMPECTOMY BREAST WITH SEED LOCALIZATION Left 2014    Procedure: LUMPECTOMY BREAST WITH SEED LOCALIZATION;  Surgeon: Shonna Leung MD;  Location: [facility] OR     LUMPECTOMY BREAST WITH SENTINEL NODE, COMBINED Left 2014    Procedure: COMBINED LUMPECTOMY BREAST WITH SENTINEL NODE;  Surgeon: Shonna Leung MD;  Location: [facility] OR     PHACOEMULSIFICATION WITH STANDARD INTRAOCULAR LENS IMPLANT Right 2020    Procedure: Cataract Removal with Implant;  Surgeon: Ricki Luo MD;  Location: [facility]     PHACOEMULSIFICATION WITH STANDARD INTRAOCULAR LENS IMPLANT Left 2020    Procedure: Cataract Removal with Implant;  Surgeon: Ricki Luo MD;  Location: [facility] OR     SURGICAL HISTORY OF -       detached retina       Family History:    Family History   Problem Relation Age of Onset     Hypertension Mother      Lipids Mother      Depression Mother          bipolar, also hysterectomy for fibroid     Genitourinary Problems Mother         Kidney  - on transplant list     Allergies Mother      Obesity Mother      Cancer Mother         skin cancer     Hyperlipidemia Mother      Mental Illness Mother         Bipolar     Lipids Brother      Allergies Father      Obesity Father      Cancer Father         skin cancer     Cancer Maternal Grandmother         ovarian     Cancer Maternal Grandfather         lung       Social History:  Marital Status:   [4]  Social History     Tobacco Use     Smoking status: Never     Smokeless tobacco: Never   Vaping Use     Vaping Use: Never used   Substance Use Topics     Alcohol use: Yes     Alcohol/week: 0.0 standard drinks     Comment: -monthly     Drug use: No        Medications:    ASPIRIN PO  DiphenhydrAMINE HCl (BENADRYL PO)  EPINEPHrine (ADRENACLICK) 0.3 MG/0.3ML injection 2-pack  lisinopril (ZESTRIL) 10 MG tablet  loteprednol (LOTEMAX) 0.5 % ophthalmic suspension  montelukast (SINGULAIR) 10 MG tablet  omeprazole (PRILOSEC) 40 MG DR capsule  simvastatin (ZOCOR) 40 MG tablet  timolol (TIMOPTIC) 0.5 % ophthalmic solution          Review of Systems   Musculoskeletal:        Right foot injury with pain, swelling and bruising    All other systems reviewed and are negative.      Physical Exam   BP: (!) 167/77  Pulse: (!) 121  Temp: 97.6  F (36.4  C)  Resp: 18  Weight: 117.9 kg (260 lb)  SpO2: 96 %      Physical Exam  Vitals and nursing note reviewed.   Constitutional:       General: She is not in acute distress.     Appearance: Normal appearance. She is normal weight. She is not ill-appearing or toxic-appearing.   Cardiovascular:      Pulses: Normal pulses.   Musculoskeletal:      Comments: Tenderness with palpation and swelling to dorsum of right foot and toes.  Neurovascularly intact.   Skin:     General: Skin is warm.      Capillary Refill: Capillary refill takes less than 2 seconds.      Findings: Bruising (old bruise  present over distal metatarsals and bruising noted to right 2nd toe) present. No erythema or rash.   Neurological:      General: No focal deficit present.      Mental Status: She is alert and oriented to person, place, and time.   Psychiatric:         Mood and Affect: Mood normal.         Behavior: Behavior normal.         Thought Content: Thought content normal.         Judgment: Judgment normal.         ED Course                 Procedures             Critical Care time:  none               Results for orders placed or performed during the hospital encounter of 01/08/23 (from the past 24 hour(s))   Foot  XR, G/E 3 views, right    Narrative    EXAM: XR FOOT RIGHT G/E 3 VIEWS  LOCATION: [facility]  DATE/TIME: 1/8/2023 12:25 PM    INDICATION: Pain after injury  COMPARISON: None.      Impression    IMPRESSION: There is some lucency projected over the proximal phalanx of the third toe worrisome for fracture but this is not seen on all projections and is potentially artifactual as one of the lines appears to extend beyond the bone. However, recommend   correlation with pain at the base of the third toe. No additional areas worrisome for fracture identified. Degenerative change first MTP joint. Plantar calcaneal spurring. Degenerative change across the midfoot at the TMT joints.       Medications - No data to display    Assessments & Plan (with Medical Decision Making)     I have reviewed the nursing notes.    I have reviewed the findings, diagnosis, plan and need for follow up with the patient.    Ann Vargas is a 57 year old female who sustained a right foot injury 1 week ago.   Mechanism of injury: she hit her foot on the edge of the tube 1 week ago with pain, bruising, and swelling.   Immediate symptoms: immediate pain, immediate swelling, was able to bear weight directly after injury, no deformity was noted by the patient.   Symptoms have been sudden, unchanged since that time.    Prior history of related problems: no prior problems with this area in the past.  Patient denies numbness, skin abrasion, or ankle pain    X-ray obtained and was some lucency projecting over the proximal phalanx of the third toe worrisome for possible fracture.  Due to this also correlating with patient's tenderness and pain will treat for fracture with varinder tape to the second and third toes and postop shoe.  Ortho referral placed and patient informed ice, rest, elevate, Tylenol and ibuprofen and to follow-up with Ortho for further evaluation management.  Patient in agreement plan and discharged in stable condition.      Discharge Medication List as of 1/8/2023 12:44 PM          Final diagnoses:   Closed fracture of phalanx of right third toe, initial encounter       1/8/2023   [facility] EMERGENCY DEPT

## 2023-02-22 ENCOUNTER — TELEPHONE (OUTPATIENT)
Dept: FAMILY MEDICINE | Facility: CLINIC | Age: 58
End: 2023-02-22

## 2023-02-22 ENCOUNTER — VIRTUAL VISIT (OUTPATIENT)
Dept: FAMILY MEDICINE | Facility: CLINIC | Age: 58
End: 2023-02-22
Payer: COMMERCIAL

## 2023-02-22 DIAGNOSIS — C50.912 MALIGNANT NEOPLASM OF LEFT FEMALE BREAST, UNSPECIFIED ESTROGEN RECEPTOR STATUS, UNSPECIFIED SITE OF BREAST (H): ICD-10-CM

## 2023-02-22 DIAGNOSIS — E66.01 MORBID OBESITY (H): ICD-10-CM

## 2023-02-22 DIAGNOSIS — U07.1 INFECTION DUE TO 2019 NOVEL CORONAVIRUS: Primary | ICD-10-CM

## 2023-02-22 PROCEDURE — 99214 OFFICE O/P EST MOD 30 MIN: CPT | Mod: CS | Performed by: FAMILY MEDICINE

## 2023-02-22 NOTE — TELEPHONE ENCOUNTER
Multiple major medication interactions with Paxlovid.     Patient scheduled for telephone visit.     Bessie Stacy RN on 2/22/2023 at 2:12 PM

## 2023-02-22 NOTE — PROGRESS NOTES
Ann is a 57 year old who is being evaluated via a billable telephone visit.      What phone number would you like to be contacted at? [phone]  How would you like to obtain your AVS? James    Distant Location (provider location):  On-site    Assessment & Plan     Infection due to 2019 novel coronavirus  High risk due to obesity. Renal function normal. Proceed with Paxlovid. Hold simvastatin for 7 days. Reasons to present to ED discussed. Conservative cares discussed   - nirmatrelvir and ritonavir (PAXLOVID) therapy pack  Dispense: 30 tablet; Refill: 0    Morbid obesity (H)  Most recent height 5 foot 6, weight of 260 lbs on 1/8/2023  Calculated BMI of 42.     Malignant neoplasm of left female breast, unspecified estrogen receptor status, unspecified site of breast (H)  Known issue that I take into account for their medical decisions; no current exacerbations or new concerns.   Follows with Oncology.     The risks, benefits and treatment options of prescribed medications or other treatments have been discussed with the patient. The patient verbalized their understanding and should call or follow up if no improvement or if they develop further problems.      Garry Galloway,   [facility]    Subjective   Ann is a 57 year old, presenting for the following health issues:  Covid Concern    HPI       COVID-19 Symptom Review  How many days ago did these symptoms start? Monday, had a positive home test yesterday    Are any of the following symptoms significant for you?    New or worsening difficulty breathing? No    Worsening cough? Yes, I am coughing up mucus and it's a dry cough    Fever or chills? No    Headache: YES    Sore throat: No    Chest pain: No    Diarrhea: YES    Body aches? YES    What treatments has patient tried? Cough syrup-made her lightheaded and nauseas feeling.   Does patient live in a nursing home, group home, or shelter? No  Does patient have a way to get  food/medications during quarantined? Yes, I have a friend or family member who can help me.                  Review of Systems   Constitutional, HEENT, cardiovascular, pulmonary, gi and gu systems are negative, except as otherwise noted.      Objective           Vitals:  No vitals were obtained today due to virtual visit.    Physical Exam   healthy, alert and no distress  PSYCH: Alert and oriented times 3; coherent speech, normal   rate and volume, able to articulate logical thoughts, able   to abstract reason, no tangential thoughts, no hallucinations   or delusions  Her affect is normal  RESP: No cough, no audible wheezing, able to talk in full sentences  Remainder of exam unable to be completed due to telephone visits        Phone call duration: 11 minutes

## 2023-02-22 NOTE — TELEPHONE ENCOUNTER
Reason for call:  Patient reporting a symptom    Symptom or request: Pt has had a cough, nasal congestion and body aches X 3 days.  Unknown if temp.   Home COVID test is positive.  Please call patient and advise.      Duration (how long have symptoms been present): 3 days    Have you been treated for this before? No    Additional comments:     Phone Number patient can be reached at:  Home number on file [phone] (home)    Best Time:  Any    Can we leave a detailed message on this number:  YES    Call taken on 2/22/2023 at 8:26 AM by Louise Henderson

## 2023-04-20 ENCOUNTER — PATIENT OUTREACH (OUTPATIENT)
Dept: CARE COORDINATION | Facility: CLINIC | Age: 58
End: 2023-04-20
Payer: COMMERCIAL

## 2023-06-02 ENCOUNTER — HEALTH MAINTENANCE LETTER (OUTPATIENT)
Age: 58
End: 2023-06-02

## 2023-06-26 ENCOUNTER — TELEPHONE (OUTPATIENT)
Dept: FAMILY MEDICINE | Facility: CLINIC | Age: 58
End: 2023-06-26
Payer: COMMERCIAL

## 2023-06-26 DIAGNOSIS — K21.9 GASTROESOPHAGEAL REFLUX DISEASE WITHOUT ESOPHAGITIS: ICD-10-CM

## 2023-06-26 DIAGNOSIS — J30.9 CHRONIC ALLERGIC RHINITIS: ICD-10-CM

## 2023-06-26 DIAGNOSIS — E78.5 HYPERLIPIDEMIA LDL GOAL <130: ICD-10-CM

## 2023-06-26 NOTE — LETTER
[facility]  [address]  [place] [address]  [phone]  June 26, 2023    Ann Vargas  [address]  [place] [address]    Dear Ann,    We care about your health and have reviewed your health plan including your medical conditions, medication list, and lab results.  Based on this review, it is recommended that you follow up regarding the following health topic(s):     -Wellness (Physical) Visit - Appointment needed for medication refills    Please call us at [phone] (or use WhatsApp) to schedule appt to address the above recommendations.     Thank you for trusting [facility] and we appreciate the opportunity to serve you.  We look forward to supporting your healthcare needs in the future.    Healthy Regards,      Your Health Care Team  [facility]

## 2023-06-26 NOTE — TELEPHONE ENCOUNTER
"Ann is due for a Yearly Preventative visit with fasting labs and refills. Thank you!    Shayla Schultz, RN      Requested Prescriptions   Pending Prescriptions Disp Refills     simvastatin (ZOCOR) 40 MG tablet [Pharmacy Med Name: SIMVASTATIN TABS 40MG] 90 tablet 3     Sig: TAKE 1 TABLET AT BEDTIME (VISIT DUE/LAST FILL)       Statins Protocol Failed - 6/26/2023 12:35 AM        Failed - LDL on file in past 12 months     Recent Labs   Lab Test 03/31/22  1654   LDL 82             Failed - Recent (12 mo) or future (30 days) visit within the authorizing provider's specialty     Patient has had an office visit with the authorizing provider or a provider within the authorizing providers department within the previous 12 mos or has a future within next 30 days. See \"Patient Info\" tab in inbasket, or \"Choose Columns\" in Meds & Orders section of the refill encounter.              Passed - No abnormal creatine kinase in past 12 months     No lab results found.             Passed - Medication is active on med list        Passed - Patient is age 18 or older        Passed - No active pregnancy on record        Passed - No positive pregnancy test in past 12 months           montelukast (SINGULAIR) 10 MG tablet [Pharmacy Med Name: MONTELUKAST SODIUM TABS 10MG] 90 tablet 3     Sig: TAKE 1 TABLET AT BEDTIME (VISIT DUE. LAST FILL)       Leukotriene Inhibitors Protocol Failed - 6/26/2023 12:35 AM        Failed - Recent (12 mo) or future (30 days) visit within the authorizing provider's specialty     Patient has had an office visit with the authorizing provider or a provider within the authorizing providers department within the previous 12 mos or has a future within next 30 days. See \"Patient Info\" tab in inbasket, or \"Choose Columns\" in Meds & Orders section of the refill encounter.              Passed - Patient is age 12 or older     If patient is under 16, ok to refill using age based dosing.           Passed - Medication is " "active on med list           omeprazole (PRILOSEC) 40 MG DR capsule [Pharmacy Med Name: OMEPRAZOLE DR CAPS 40MG] 90 capsule 3     Sig: TAKE 1 CAPSULE DAILY (VISIT DUE. LAST FILL)       PPI Protocol Failed - 6/26/2023 12:35 AM        Failed - Recent (12 mo) or future (30 days) visit within the authorizing provider's specialty     Patient has had an office visit with the authorizing provider or a provider within the authorizing providers department within the previous 12 mos or has a future within next 30 days. See \"Patient Info\" tab in inbasket, or \"Choose Columns\" in Meds & Orders section of the refill encounter.              Passed - Not on Clopidogrel (unless Pantoprazole ordered)        Passed - No diagnosis of osteoporosis on record        Passed - Medication is active on med list        Passed - Patient is age 18 or older        Passed - No active pregnacy on record        Passed - No positive pregnancy test in past 12 months             "

## 2023-06-26 NOTE — TELEPHONE ENCOUNTER
Hasn't been seen in 15 months.   Can call her and schedule follow up apt and then refill x 1 month.    Cydney Garcia M.D.

## 2023-06-28 RX ORDER — OMEPRAZOLE 40 MG/1
CAPSULE, DELAYED RELEASE ORAL
Qty: 90 CAPSULE | Refills: 3 | OUTPATIENT
Start: 2023-06-28

## 2023-06-28 RX ORDER — SIMVASTATIN 40 MG
TABLET ORAL
Qty: 90 TABLET | Refills: 3 | OUTPATIENT
Start: 2023-06-28

## 2023-06-28 RX ORDER — MONTELUKAST SODIUM 10 MG/1
TABLET ORAL
Qty: 90 TABLET | Refills: 3 | OUTPATIENT
Start: 2023-06-28

## 2023-06-28 NOTE — TELEPHONE ENCOUNTER
See post from 6/26/23 @ 1pm - LM for pt - Also sent My Chart message and mailed letter     Pt read My Chart message 6/26/23 @ 1:04pm and no appt has been made.      RN, Please remove med and close

## 2023-07-24 DIAGNOSIS — I10 BENIGN ESSENTIAL HYPERTENSION: ICD-10-CM

## 2023-07-24 RX ORDER — LISINOPRIL 10 MG/1
TABLET ORAL
Qty: 30 TABLET | Refills: 0 | Status: SHIPPED | OUTPATIENT
Start: 2023-07-24 | End: 2023-09-13

## 2023-07-24 NOTE — TELEPHONE ENCOUNTER
"Ann is due for a yearly preventative visit with refills. Thank you!    Shayla Schultz, RN      Requested Prescriptions   Pending Prescriptions Disp Refills    lisinopril (ZESTRIL) 10 MG tablet [Pharmacy Med Name: LISINOPRIL TABS 10MG] 90 tablet 3     Sig: TAKE 1 TABLET DAILY (VISIT DUE. LAST FILL)       ACE Inhibitors (Including Combos) Protocol Failed - 7/24/2023 12:53 AM        Failed - Blood pressure under 140/90 in past 12 months     BP Readings from Last 3 Encounters:   01/08/23 (!) 167/77   03/31/22 120/82   12/03/20 130/88                 Failed - Recent (12 mo) or future (30 days) visit within the authorizing provider's specialty     Patient has had an office visit with the authorizing provider or a provider within the authorizing providers department within the previous 12 mos or has a future within next 30 days. See \"Patient Info\" tab in inbasket, or \"Choose Columns\" in Meds & Orders section of the refill encounter.              Failed - Normal serum creatinine on file in past 12 months     Recent Labs   Lab Test 03/31/22  1654   CR 0.72       Ok to refill medication if creatinine is low          Failed - Normal serum potassium on file in past 12 months     Recent Labs   Lab Test 03/31/22  1654   POTASSIUM 4.1             Passed - Medication is active on med list        Passed - Patient is age 18 or older        Passed - No active pregnancy on record        Passed - No positive pregnancy test within past 12 months             "

## 2023-08-01 NOTE — TELEPHONE ENCOUNTER
Panel Management Review      Patient has the following on her problem list: None      Composite cancer screening  Chart review shows that this patient is due/due soon for the following Fecal Colorectal (FIT)  Summary:    Patient is due/failing the following:   FIT    Action needed:   Patient needs referral/order: fit    Type of outreach:    Phone, left message for patient to call back.     Questions for provider review:    None                                                                                                                                    Hilayr Freeman MA       Chart routed to Care Team .          
mammogram

## 2023-08-11 ENCOUNTER — HOSPITAL ENCOUNTER (OUTPATIENT)
Dept: MAMMOGRAPHY | Facility: CLINIC | Age: 58
Discharge: HOME | End: 2023-08-11
Attending: FAMILY MEDICINE | Admitting: FAMILY MEDICINE
Payer: COMMERCIAL

## 2023-08-11 DIAGNOSIS — Z12.31 VISIT FOR SCREENING MAMMOGRAM: ICD-10-CM

## 2023-08-11 PROCEDURE — 77067 SCR MAMMO BI INCL CAD: CPT

## 2023-09-06 ASSESSMENT — ENCOUNTER SYMPTOMS
SORE THROAT: 0
EYE PAIN: 0
COUGH: 0
DIARRHEA: 0
WEAKNESS: 0
BREAST MASS: 0
NAUSEA: 0
CHILLS: 0
JOINT SWELLING: 0
DYSURIA: 0
PALPITATIONS: 0
DIZZINESS: 0
ABDOMINAL PAIN: 0
HEMATURIA: 0
NERVOUS/ANXIOUS: 0
HEARTBURN: 0
MYALGIAS: 0
PARESTHESIAS: 0
HEADACHES: 0
FEVER: 0
CONSTIPATION: 0
HEMATOCHEZIA: 0
FREQUENCY: 0
SHORTNESS OF BREATH: 0
ARTHRALGIAS: 0

## 2023-09-13 ENCOUNTER — OFFICE VISIT (OUTPATIENT)
Dept: FAMILY MEDICINE | Facility: CLINIC | Age: 58
End: 2023-09-13
Payer: COMMERCIAL

## 2023-09-13 VITALS
RESPIRATION RATE: 22 BRPM | OXYGEN SATURATION: 97 % | BODY MASS INDEX: 39.86 KG/M2 | HEART RATE: 66 BPM | TEMPERATURE: 97.8 F | SYSTOLIC BLOOD PRESSURE: 138 MMHG | WEIGHT: 248 LBS | DIASTOLIC BLOOD PRESSURE: 86 MMHG | HEIGHT: 66 IN

## 2023-09-13 DIAGNOSIS — K21.9 GASTROESOPHAGEAL REFLUX DISEASE WITHOUT ESOPHAGITIS: ICD-10-CM

## 2023-09-13 DIAGNOSIS — E78.5 HYPERLIPIDEMIA LDL GOAL <130: ICD-10-CM

## 2023-09-13 DIAGNOSIS — I10 BENIGN ESSENTIAL HYPERTENSION: ICD-10-CM

## 2023-09-13 DIAGNOSIS — E83.52 HYPERCALCEMIA: ICD-10-CM

## 2023-09-13 DIAGNOSIS — E55.9 VITAMIN D DEFICIENCY: ICD-10-CM

## 2023-09-13 DIAGNOSIS — Z00.00 ROUTINE GENERAL MEDICAL EXAMINATION AT A HEALTH CARE FACILITY: Primary | ICD-10-CM

## 2023-09-13 DIAGNOSIS — T78.2XXS ANAPHYLAXIS, SEQUELA: ICD-10-CM

## 2023-09-13 DIAGNOSIS — E21.3 HYPERPARATHYROIDISM (H): ICD-10-CM

## 2023-09-13 DIAGNOSIS — J30.9 CHRONIC ALLERGIC RHINITIS: ICD-10-CM

## 2023-09-13 PROCEDURE — 99213 OFFICE O/P EST LOW 20 MIN: CPT | Mod: 25 | Performed by: FAMILY MEDICINE

## 2023-09-13 PROCEDURE — 99396 PREV VISIT EST AGE 40-64: CPT | Performed by: FAMILY MEDICINE

## 2023-09-13 RX ORDER — LISINOPRIL 20 MG/1
20 TABLET ORAL DAILY
Qty: 90 TABLET | Refills: 3 | Status: SHIPPED | OUTPATIENT
Start: 2023-09-13 | End: 2024-08-16

## 2023-09-13 RX ORDER — MONTELUKAST SODIUM 10 MG/1
1 TABLET ORAL AT BEDTIME
Qty: 30 TABLET | Refills: 0 | Status: SHIPPED | OUTPATIENT
Start: 2023-09-13 | End: 2023-09-13

## 2023-09-13 RX ORDER — EPINEPHRINE 0.3 MG/.3ML
0.3 INJECTION SUBCUTANEOUS PRN
Qty: 0.6 ML | Refills: 2 | Status: SHIPPED | OUTPATIENT
Start: 2023-09-13

## 2023-09-13 RX ORDER — OMEPRAZOLE 40 MG/1
CAPSULE, DELAYED RELEASE ORAL
Qty: 90 CAPSULE | Refills: 3 | Status: SHIPPED | OUTPATIENT
Start: 2023-09-13 | End: 2024-08-22

## 2023-09-13 RX ORDER — SIMVASTATIN 40 MG
TABLET ORAL
Qty: 90 TABLET | Refills: 3 | Status: SHIPPED | OUTPATIENT
Start: 2023-09-13 | End: 2024-08-22

## 2023-09-13 RX ORDER — MONTELUKAST SODIUM 10 MG/1
1 TABLET ORAL AT BEDTIME
Qty: 90 TABLET | Refills: 3 | Status: SHIPPED | OUTPATIENT
Start: 2023-09-13 | End: 2024-08-22

## 2023-09-13 RX ORDER — LISINOPRIL 10 MG/1
10 TABLET ORAL DAILY
Qty: 90 TABLET | Refills: 3 | Status: SHIPPED | OUTPATIENT
Start: 2023-09-13 | End: 2023-09-13 | Stop reason: DRUGHIGH

## 2023-09-13 ASSESSMENT — ENCOUNTER SYMPTOMS
DIZZINESS: 0
PARESTHESIAS: 0
NAUSEA: 0
COUGH: 0
CONSTIPATION: 0
CHILLS: 0
FREQUENCY: 0
HEADACHES: 0
NERVOUS/ANXIOUS: 0
BREAST MASS: 0
MYALGIAS: 0
EYE PAIN: 0
DYSURIA: 0
JOINT SWELLING: 0
SHORTNESS OF BREATH: 0
ARTHRALGIAS: 0
HEMATOCHEZIA: 0
SORE THROAT: 0
WEAKNESS: 0
FEVER: 0
PALPITATIONS: 0
DIARRHEA: 0
ABDOMINAL PAIN: 0
HEMATURIA: 0
HEARTBURN: 0

## 2023-09-13 ASSESSMENT — PAIN SCALES - GENERAL: PAINLEVEL: NO PAIN (0)

## 2023-09-22 ENCOUNTER — LAB (OUTPATIENT)
Dept: LAB | Facility: CLINIC | Age: 58
End: 2023-09-22
Payer: COMMERCIAL

## 2023-09-22 ENCOUNTER — IMMUNIZATION (OUTPATIENT)
Dept: FAMILY MEDICINE | Facility: CLINIC | Age: 58
End: 2023-09-22
Payer: COMMERCIAL

## 2023-09-22 DIAGNOSIS — E78.5 HYPERLIPIDEMIA LDL GOAL <130: ICD-10-CM

## 2023-09-22 DIAGNOSIS — I10 BENIGN ESSENTIAL HYPERTENSION: ICD-10-CM

## 2023-09-22 LAB
ALBUMIN SERPL BCG-MCNC: 4.4 G/DL (ref 3.5–5.2)
ALP SERPL-CCNC: 121 U/L (ref 35–104)
ALT SERPL W P-5'-P-CCNC: 37 U/L (ref 0–50)
ANION GAP SERPL CALCULATED.3IONS-SCNC: 8 MMOL/L (ref 7–15)
AST SERPL W P-5'-P-CCNC: 28 U/L (ref 0–45)
BILIRUB SERPL-MCNC: 0.4 MG/DL
BUN SERPL-MCNC: 15.4 MG/DL (ref 8–23)
CALCIUM SERPL-MCNC: 11.2 MG/DL (ref 8.6–10)
CHLORIDE SERPL-SCNC: 103 MMOL/L (ref 98–107)
CHOLEST SERPL-MCNC: 226 MG/DL
CREAT SERPL-MCNC: 0.72 MG/DL (ref 0.51–0.95)
DEPRECATED HCO3 PLAS-SCNC: 28 MMOL/L (ref 22–29)
EGFRCR SERPLBLD CKD-EPI 2021: >90 ML/MIN/1.73M2
GLUCOSE SERPL-MCNC: 102 MG/DL (ref 70–99)
HDLC SERPL-MCNC: 76 MG/DL
LDLC SERPL CALC-MCNC: 123 MG/DL
NONHDLC SERPL-MCNC: 150 MG/DL
POTASSIUM SERPL-SCNC: 4.5 MMOL/L (ref 3.4–5.3)
PROT SERPL-MCNC: 7.5 G/DL (ref 6.4–8.3)
SODIUM SERPL-SCNC: 139 MMOL/L (ref 136–145)
TRIGL SERPL-MCNC: 137 MG/DL

## 2023-09-22 PROCEDURE — 36415 COLL VENOUS BLD VENIPUNCTURE: CPT

## 2023-09-22 PROCEDURE — 80061 LIPID PANEL: CPT

## 2023-09-22 PROCEDURE — 80053 COMPREHEN METABOLIC PANEL: CPT

## 2023-09-22 PROCEDURE — 90471 IMMUNIZATION ADMIN: CPT

## 2023-09-22 PROCEDURE — 90682 RIV4 VACC RECOMBINANT DNA IM: CPT

## 2023-09-29 ENCOUNTER — LAB (OUTPATIENT)
Dept: LAB | Facility: CLINIC | Age: 58
End: 2023-09-29
Payer: COMMERCIAL

## 2023-09-29 DIAGNOSIS — E83.52 HYPERCALCEMIA: ICD-10-CM

## 2023-09-29 LAB
CA-I BLD-MCNC: 5.6 MG/DL (ref 4.4–5.2)
PTH-INTACT SERPL-MCNC: 86 PG/ML (ref 15–65)
VIT D+METAB SERPL-MCNC: 17 NG/ML (ref 20–50)

## 2023-09-29 PROCEDURE — 36415 COLL VENOUS BLD VENIPUNCTURE: CPT

## 2023-09-29 PROCEDURE — 83970 ASSAY OF PARATHORMONE: CPT

## 2023-09-29 PROCEDURE — 82330 ASSAY OF CALCIUM: CPT

## 2023-09-29 PROCEDURE — 82306 VITAMIN D 25 HYDROXY: CPT

## 2023-10-02 RX ORDER — ERGOCALCIFEROL 1.25 MG/1
50000 CAPSULE, LIQUID FILLED ORAL WEEKLY
Qty: 8 CAPSULE | Refills: 0 | Status: SHIPPED | OUTPATIENT
Start: 2023-10-02 | End: 2023-10-03 | Stop reason: DRUGHIGH

## 2023-10-03 ENCOUNTER — TELEPHONE (OUTPATIENT)
Dept: FAMILY MEDICINE | Facility: CLINIC | Age: 58
End: 2023-10-03
Payer: COMMERCIAL

## 2023-10-03 DIAGNOSIS — E55.9 VITAMIN D DEFICIENCY: Primary | ICD-10-CM

## 2023-10-03 NOTE — TELEPHONE ENCOUNTER
I chose a Vit D3 50,000 international unit(s) formulation and still getting yellow dye warning.    Cydney Garcia M.D.

## 2023-10-03 NOTE — TELEPHONE ENCOUNTER
General Call    Reason for Call:  Per fax from [facility] - Pt is allergic to yellow dye, so Vitamin D Rx needs to be changed and resent to pharmacy.  Fax placed on MD's desk.

## 2023-10-05 ENCOUNTER — TELEPHONE (OUTPATIENT)
Dept: FAMILY MEDICINE | Facility: CLINIC | Age: 58
End: 2023-10-05
Payer: COMMERCIAL

## 2023-10-05 DIAGNOSIS — E21.3 HYPERPARATHYROIDISM (H): Primary | ICD-10-CM

## 2023-10-05 NOTE — TELEPHONE ENCOUNTER
----- Message from Sylwia Marcelo sent at 10/5/2023  7:07 AM CDT -----  Regarding: Order Needed  Dr Garcia,      I am a DEXA tech at [facility].  It is our protocol to also do a forearm scan on patients that are diagnosed with Hypoparathyroidism.  Could you place an order for a DEXA wrist scan on this patient.  She is scheduled for 10-10-23 @ 8:30.  We can do both on these exams on that day.      Thank you for your help.    Sylwia CHRISTINE,BD,M)

## 2023-10-10 ENCOUNTER — HOSPITAL ENCOUNTER (OUTPATIENT)
Dept: BONE DENSITY | Facility: CLINIC | Age: 58
Discharge: HOME | End: 2023-10-10
Attending: FAMILY MEDICINE
Payer: COMMERCIAL

## 2023-10-10 DIAGNOSIS — E21.3 HYPERPARATHYROIDISM (H): ICD-10-CM

## 2023-10-10 PROCEDURE — 77080 DXA BONE DENSITY AXIAL: CPT

## 2023-10-10 PROCEDURE — 77081 DXA BONE DENSITY APPENDICULR: CPT

## 2023-10-23 ENCOUNTER — OFFICE VISIT (OUTPATIENT)
Dept: ENDOCRINOLOGY | Facility: CLINIC | Age: 58
End: 2023-10-23
Attending: FAMILY MEDICINE
Payer: COMMERCIAL

## 2023-10-23 VITALS
HEART RATE: 91 BPM | OXYGEN SATURATION: 95 % | WEIGHT: 250 LBS | BODY MASS INDEX: 40.35 KG/M2 | DIASTOLIC BLOOD PRESSURE: 93 MMHG | SYSTOLIC BLOOD PRESSURE: 153 MMHG

## 2023-10-23 DIAGNOSIS — E83.52 HYPERCALCEMIA: ICD-10-CM

## 2023-10-23 DIAGNOSIS — E55.9 VITAMIN D DEFICIENCY: ICD-10-CM

## 2023-10-23 DIAGNOSIS — E21.3 HYPERPARATHYROIDISM (H): ICD-10-CM

## 2023-10-23 LAB
ALBUMIN SERPL BCG-MCNC: 4.2 G/DL (ref 3.5–5.2)
CALCIUM SERPL-MCNC: 10.9 MG/DL (ref 8.6–10)
MAGNESIUM SERPL-MCNC: 2.2 MG/DL (ref 1.7–2.3)
PHOSPHATE SERPL-MCNC: 2.6 MG/DL (ref 2.5–4.5)
PTH-INTACT SERPL-MCNC: 83 PG/ML (ref 15–65)

## 2023-10-23 PROCEDURE — 99204 OFFICE O/P NEW MOD 45 MIN: CPT | Performed by: INTERNAL MEDICINE

## 2023-10-23 PROCEDURE — 84100 ASSAY OF PHOSPHORUS: CPT | Performed by: INTERNAL MEDICINE

## 2023-10-23 PROCEDURE — 82310 ASSAY OF CALCIUM: CPT | Performed by: INTERNAL MEDICINE

## 2023-10-23 PROCEDURE — 83970 ASSAY OF PARATHORMONE: CPT | Performed by: INTERNAL MEDICINE

## 2023-10-23 PROCEDURE — 82040 ASSAY OF SERUM ALBUMIN: CPT | Performed by: INTERNAL MEDICINE

## 2023-10-23 PROCEDURE — 83735 ASSAY OF MAGNESIUM: CPT | Performed by: INTERNAL MEDICINE

## 2023-10-23 PROCEDURE — 36415 COLL VENOUS BLD VENIPUNCTURE: CPT | Performed by: INTERNAL MEDICINE

## 2023-10-23 NOTE — NURSING NOTE
Ann Vargas's goals for this visit include:   Chief Complaint   Patient presents with    New Patient    Thyroid Problem     Hyperparathyroidism /Vit D deficiency      She requests these members of her care team be copied on today's visit information: Yes     PCP: Cydney Garcia    Referring Provider:  Cydney Garcia MD  [address] [place] [address]    BP (!) 153/93 (BP Location: Right arm, Patient Position: Sitting, Cuff Size: Adult Large)   Pulse 91   Wt 113.4 kg (250 lb)   LMP 02/17/2015   SpO2 95%   BMI 40.35 kg/m      Do you need any medication refills at today's visit? Unsure      Khadra Mckeon CMA  Adult Endocrinology   [facility]

## 2023-10-23 NOTE — PATIENT INSTRUCTIONS
"[facility]-Department of Endocrinology  Diabetes Educators:   Ladi Figueroa RN and Antonia Man RN  Clinic Nurse: ISHAN Moreno  CMA's: Khadra Leblanc and Mehrdad   EMT: Geovani  Scheduling/Clinic phone number : [phone]   Clinic Fax: [phone]  On-Call Endocrine at the [facility] (after hours/weekends): [phone] option 4        Please call the number below to schedule your labs.  [facility] [phone]   [facility] [phone]   [place] [phone]   [facility]  [phone]   [facility] [phone]   [place] [phone]   [facility]) [phone]   [facility] Walk-In Only   [place] [phone]   [place] [phone]   [place] [phone]   [place] [phone]     Please reach out to the following centers to schedule your imaging appointment:  Imaging (DEXA, CT, MRI, XRAY)    [facility] ([facility], [place]) [phone]   [facility] ([place], [place]) [phone]   [facility] ([facility]) [phone]   [facility] ([facility]) [phone]       Vitamin D 2000 units daily    24-Hour Urine Collection Guidelines for Patients    It does not matter what time of day you start the clock.  Make the collection time when it will be the most convenient for you.  If you miss adding any urine during the 24-hour period or if the container spills, you will need to start over.    If you think you will need more than one container during the 24-hour period, call the Lab for an additional container or continue collecting the urine in a clean (non-bleached), plastic container you have at home.    Please verify that the container is correctly labeled with your name, and your medical record number or birthdate.    1. Empty your bladder completely.  Do not save any of that urine.  Note the time, as this will be the \"START\" time of the 24-hour collection period.  2. Save ALL urine passed in the next 24 hours, adding each " specimen to the collection container.  Keep the container refrigerated during the collection.  3. At the end of the 24-hour period, empty your bladder (even if you don't feel as if you need to urinate) and add it to the container.  4. When complete, bring the container to the Laboratory as soon as possible.  Be sure to stop at the Registration Desk before dropping your specimen off in the Lab.  Note: If you are collecting urine for a Urosat (for stones), please try to bring the container to the Lab Monday-Friday.

## 2023-10-23 NOTE — PROGRESS NOTES
Endocrine Clinic Consult        Reason for consult: Hypercalcemia  Date: 10/23/2023    Referring Physician: Cydney Garcia   Ann Vargas is a 57 year old female who is here for evaluation of hypercalcemia likely secondary to primary hyperparathyroidism with perhaps some contribution of vitamin D deficiency. There is no evidence of clinical features suggestive of hypercalcemia, with DEXA demonstrating no evidence of osteoporosis and creatinine within normal limits. We will plan to recheck labs today and have discussed the possibility of parathyroidectomy, which will be considered by Ms. Vargas while awaiting repeat labs and 24 hour urine calcium collection.    Plan  - repeat BMP, obtain phosphorous today  - obtain 24h urine calcium, collection materials provided today  - recommend adequate hydration (6-8 glasses of water daily)  - avoid calcium supplements, okay to maintain calcium in diet as before  - decrease vitamin D supplementation to 2000 international unit(s) daily  - discussed referral for parathyroidectomy, order placed for CT parathyroid    Return to clinic in 6 months.    This patient was discussed with the attending physician, Dr. Sanchez.    Paul Fraire MD  PGY-2, Internal Medicine-Pediatrics      Farida Sanchez MD  Endocrinology and Diabetes  Telephone contact:  [facility] [place] [phone]  [facility] [phone]      HPI:   Ann Vargas is a 57 year old female who presents for evaluation of hypercalcemia and hyperparathyroidism.    Ms. Vargas is referred by her PCP for hypercalcemia with a calcium of 11.2 that was noted on screening labs obtained in 9/2022. At the time, she was otherwise asymptomatic and feeling well, and was not taking calcium supplements. Further lab work up revealed PTH 86, vitamin D level 17, iCal 5.6, creatinine 0.72. DEXA scan with osteopenia at one or more measured sites. She was started on vitamin  D supplementation and referred to endocrinology for further recommendations.    Clinical symptom assessment  Risk Factors:    No diagnosis of sarcoidosis  No history of immobilization  No history of thiazide diuretic treatment   No history of lithium treatment  Calcium supplements: no  No history of high dose Vitamin A supplementation  History of thyroid disorder: none  No family history of hyperparathyroidism, high calcium or MEN    Calcium and Vit D supplements : 50,000 international unit(s) weekly starting 10/3/2023  Other supplements: none    Symptoms suggestive of hyper or hypocalcemia: none  Symptoms anxiety, depression or cognitive dysfunction: no  Severe symptoms in past such as lethargy, confusion, stupor or coma associated with high calcium: no  GI complaints - Constipation, Anorexia, Weight changes, Nausea: none  History of Peptic ulcers or pancreatitis: no  Renal symptoms- polyuria, polydipsia: no  History of renal stones or renal failure: no  Palpitations: no  Muscle weakness or bone pain: no  Fractures: 2023 toe fracture after dropping weight on foot, no fragility fractures    Past Medical History:   Diagnosis Date    Benign essential hypertension 2020    Breast cancer (H)     Chronic rhinitis 10/23/2006    10/23/2006 Had been on shots--now on singulair  Working well    Complication of anesthesia     Diagnostic skin and sensitization tests (aka ALLERGENS)     3/11/15 IgE tests NEGATIVE for orange, tomato, strawberry, apple--cherry, g. pepper and mushrrom are pending.    GERD (gastroesophageal reflux disease)     Hyperlipidemia     PONV (postoperative nausea and vomiting)     Unexplained endometrial cells on cervical cytology 12/17/15    2/15/16 emb scheduled.      Past Surgical History:   Procedure Laterality Date    BIOPSY BREAST      C/SECTION, CLASSICAL  Aug 93 &     , Classical x 2    EYE SURGERY  ?    detachted retina surgery    LUMPECTOMY BREAST      LUMPECTOMY BREAST  WITH SEED LOCALIZATION Left 11/19/2014    Procedure: LUMPECTOMY BREAST WITH SEED LOCALIZATION;  Surgeon: Shonna Leung MD;  Location: [facility]    LUMPECTOMY BREAST WITH SENTINEL NODE, COMBINED Left 11/19/2014    Procedure: COMBINED LUMPECTOMY BREAST WITH SENTINEL NODE;  Surgeon: Shonna Leung MD;  Location: [facility]    PHACOEMULSIFICATION WITH STANDARD INTRAOCULAR LENS IMPLANT Right 8/17/2020    Procedure: Cataract Removal with Implant;  Surgeon: Ricki Luo MD;  Location: [facility]    PHACOEMULSIFICATION WITH STANDARD INTRAOCULAR LENS IMPLANT Left 9/9/2020    Procedure: Cataract Removal with Implant;  Surgeon: Ricki Luo MD;  Location: [facility]    SURGICAL HISTORY OF -   1991    detached retina     Family History   Problem Relation Age of Onset    Hypertension Mother     Lipids Mother     Depression Mother         bipolar, also hysterectomy for fibroid    Genitourinary Problems Mother         Kidney  - on transplant list    Allergies Mother     Obesity Mother     Cancer Mother         skin cancer    Hyperlipidemia Mother     Mental Illness Mother         Bipolar    Lipids Brother     Allergies Father     Obesity Father     Cancer Father         skin cancer    Cancer Maternal Grandmother         ovarian    Cancer Maternal Grandfather         lung     Social History     Socioeconomic History    Marital status:      Spouse name: Not on file    Number of children: Not on file    Years of education: Not on file    Highest education level: Not on file   Occupational History    Not on file   Tobacco Use    Smoking status: Never    Smokeless tobacco: Never   Vaping Use    Vaping Use: Never used   Substance and Sexual Activity    Alcohol use: Yes     Alcohol/week: 0.0 standard drinks of alcohol     Comment: -monthly    Drug use: No    Sexual activity: Not Currently     Partners: Male     Birth control/protection: Post-menopausal   Other Topics Concern    Parent/sibling w/  "CABG, MI or angioplasty before 65F 55M? No   Social History Narrative    Not on file     Social Determinants of Health     Financial Resource Strain: Not on file   Food Insecurity: Not on file   Transportation Needs: Not on file   Physical Activity: Not on file   Stress: Not on file   Social Connections: Not on file   Interpersonal Safety: Not on file   Housing Stability: Not on file        Allergies   Allergen Reactions    Yellow Dye Anaphylaxis     Yellow dye #5/#6      Current Outpatient Medications   Medication    EPINEPHrine (ADRENACLICK) 0.3 MG/0.3ML injection 2-pack    lisinopril (ZESTRIL) 20 MG tablet    montelukast (SINGULAIR) 10 MG tablet    omeprazole (PRILOSEC) 40 MG DR capsule    simvastatin (ZOCOR) 40 MG tablet    vitamin D3 (CHOLECALCIFEROL) 1.25 MG (75303 UT) capsule    timolol (TIMOPTIC) 0.5 % ophthalmic solution     No current facility-administered medications for this visit.           Exam:  BP (!) 153/93 (BP Location: Right arm, Patient Position: Sitting, Cuff Size: Adult Large)   Pulse 91   Wt 113.4 kg (250 lb)   LMP 02/17/2015   SpO2 95%   BMI 40.35 kg/m     Wt Readings from Last 4 Encounters:   10/23/23 113.4 kg (250 lb)   09/13/23 112.5 kg (248 lb)   01/08/23 117.9 kg (260 lb)   03/31/22 120.4 kg (265 lb 8 oz)     Constitutional: Healthy, alert, no distress and cooperative  Head: Normocephalic. EOMI.   Neck: Neck supple. No adenopathy. Thyroid symmetric, normal size.  Cardiovascular: Regular rhythm, no tachycardia  Respiratory: Lungs clear to auscultation bilaterally  Gastrointestinal: Non-distended  Neurologic: Normal speech   Psychiatric: Mentation appears normal     Labs:  CBC RESULTS: No results for input(s): \"WBC\", \"RBC\", \"HGB\", \"HCT\", \"MCV\", \"MCH\", \"MCHC\", \"RDW\", \"PLT\" in the last 26356 hours.  Recent Labs   Lab Test 09/22/23  0917 03/31/22  1654    141   POTASSIUM 4.5 4.1   CHLORIDE 103 108   CO2 28 27   ANIONGAP 8 6   * 101*   BUN 15.4 11   CR 0.72 0.72   ANGELLA 11.2* " 10.2*     Lab Results   Component Value Date    ANGELLA 11.2 (H) 09/22/2023    ANGELLA 10.2 (H) 03/31/2022    ANGELLA 9.9 12/18/2020    ANGELLA 9.6 10/28/2020    ALBUMIN 4.4 09/22/2023    ICAW 5.6 (H) 09/29/2023    ALT 37 09/22/2023    PTHI 86 (H) 09/29/2023    AST 28 09/22/2023    BILITOTAL 0.4 09/22/2023    CR 0.72 09/22/2023    CR 0.72 03/31/2022    CR 0.77 12/18/2020     09/22/2023    TSH 2.57 12/17/2015    ALKPHOS 121 (H) 09/22/2023    HGB 12.2 02/13/2015       Results for orders placed during the hospital encounter of 10/10/23    DX Hip/Pelvis/Spine    Narrative  EXAM: DX WRIST/HEEL/RADIUS, DX HIP/PELVIS/SPINE  LOCATION: [facility]  DATE: 10/10/2023    INDICATION: Postmenopausal female with a history of breast cancer. History of hyperparathyroidism.  DEMOGRAPHICS: Age- 57 years. Gender- Female. Menopausal status- Postmenopausal.  COMPARISON: 11/21/2017.  TECHNIQUE: Dual-energy x-ray absorptiometry (DXA) performed with routine technique. Forearm DXA performed due to hyperparathyroidism.    FINDINGS:    DXA RESULTS  -Lumbar Spine: L1-L4: BMD: 1.305 g/cm2. T-score: 0.9. Z-score: 0.9. I suspect degenerative changes throughout the lumbar spine.  -RIGHT Hip Total: BMD: 0.975 g/cm2. T-score: -0.3. Z-score: -0.2.  -RIGHT Hip Femoral neck: BMD: 0.935 g/cm2. T-score: -0.7. Z-score: -0.3.  -LEFT Hip Total: BMD: 0.983 g/cm2. T-score: -0.2. Z-score: -0.1.  -LEFT Hip Femoral neck: BMD: 0.845 g/cm2. T-score: -1.4. Z-score: -0.9.  -LEFT Radius 33%: BMD: 0.620 g/cm2. T-score: -1.3. Z-score: -0.4.    WHO T-SCORE CRITERIA  -Normal: T score at or above -1 SD  -Osteopenia: T score between -1 and -2.5 SD  -Osteoporosis: T score at or below -2.5 SD    The World Health Organization (WHO) criteria is applicable to perimenopausal females, postmenopausal females, and men aged 50 years or older.    INTERVAL CHANGE  -There has been a 1.8% decrease in L3-L4 lumbar spine BMD. Sclerotic changes likely seen throughout the  lumbar spine.  -There has been a 6.4% decrease in bilateral hip BMD.    FRACTURE RISK  -FRAX Results: The 10 year probability of major osteoporotic fracture is 7.0%, and of hip fracture is 0.5%, based on left femoral neck BMD.    RECOMMENDATIONS  Consider treatment if major osteoporotic fracture score is greater than or equal to 20%, or if the hip fracture score is greater than or equal to 3%.    Impression  IMPRESSION: Low bone density (OSTEOPENIA). T score meets the WHO criteria for low bone density (osteopenia) at one or more measured sites. The risk of osteoporotic fracture increases approximately two-fold for each standard deviation decrease in T-score.

## 2023-10-23 NOTE — LETTER
10/23/2023         RE: Ann Vargas  [address]  [facility] [address]        Dear Colleague,    Thank you for referring your patient, Ann Vargas, to the [facility]. Please see a copy of my visit note below.        Endocrine Clinic Consult        Reason for consult: Hypercalcemia  Date: 10/23/2023    Referring Physician: Cydney Garcia  Ann Vargas is a 57 year old female who is here for evaluation of hypercalcemia likely secondary to primary hyperparathyroidism with perhaps some contribution of vitamin D deficiency. There is no evidence of clinical features suggestive of hypercalcemia, with DEXA demonstrating no evidence of osteoporosis and creatinine within normal limits. We will plan to recheck labs today and have discussed the possibility of parathyroidectomy, which will be considered by Ms. Vargas while awaiting repeat labs and 24 hour urine calcium collection.    Plan  - repeat BMP, obtain phosphorous today  - obtain 24h urine calcium, collection materials provided today  - recommend adequate hydration (6-8 glasses of water daily)  - avoid calcium supplements, okay to maintain calcium in diet as before  - decrease vitamin D supplementation to 2000 international unit(s) daily  - discussed referral for parathyroidectomy, order placed for CT parathyroid    Return to clinic in 6 months.    This patient was discussed with the attending physician, Dr. Sanchez.    Paul Fraire MD  PGY-2, Internal Medicine-Pediatrics      Farida Sanchez MD  Endocrinology and Diabetes  Telephone contact:  [facility] [place] [phone]  [facility] [phone]      HPI:   Ann Vargas is a 57 year old female who presents for evaluation of hypercalcemia and hyperparathyroidism.    Ms. Vargas is referred by her PCP for hypercalcemia with a calcium of 11.2 that was noted on screening labs obtained in 9/2022. At the  time, she was otherwise asymptomatic and feeling well, and was not taking calcium supplements. Further lab work up revealed PTH 86, vitamin D level 17, iCal 5.6, creatinine 0.72. DEXA scan with osteopenia at one or more measured sites. She was started on vitamin D supplementation and referred to endocrinology for further recommendations.    Clinical symptom assessment  Risk Factors:    No diagnosis of sarcoidosis  No history of immobilization  No history of thiazide diuretic treatment   No history of lithium treatment  Calcium supplements: no  No history of high dose Vitamin A supplementation  History of thyroid disorder: none  No family history of hyperparathyroidism, high calcium or MEN    Calcium and Vit D supplements : 50,000 international unit(s) weekly starting 10/3/2023  Other supplements: none    Symptoms suggestive of hyper or hypocalcemia: none  Symptoms anxiety, depression or cognitive dysfunction: no  Severe symptoms in past such as lethargy, confusion, stupor or coma associated with high calcium: no  GI complaints - Constipation, Anorexia, Weight changes, Nausea: none  History of Peptic ulcers or pancreatitis: no  Renal symptoms- polyuria, polydipsia: no  History of renal stones or renal failure: no  Palpitations: no  Muscle weakness or bone pain: no  Fractures: 1/1/2023 toe fracture after dropping weight on foot, no fragility fractures    Past Medical History:   Diagnosis Date     Benign essential hypertension 11/9/2020     Breast cancer (H)      Chronic rhinitis 10/23/2006    10/23/2006 Had been on shots--now on singulair  Working well     Complication of anesthesia      Diagnostic skin and sensitization tests (aka ALLERGENS)     3/11/15 IgE tests NEGATIVE for orange, tomato, strawberry, apple--cherry, g. pepper and mushrrom are pending.     GERD (gastroesophageal reflux disease)      Hyperlipidemia      PONV (postoperative nausea and vomiting)      Unexplained endometrial cells on cervical cytology  12/17/15    2/15/16 emb scheduled.      Past Surgical History:   Procedure Laterality Date     BIOPSY BREAST       C/SECTION, CLASSICAL  Aug 93 &     , Classical x 2     EYE SURGERY  ?    detachted retina surgery     LUMPECTOMY BREAST       LUMPECTOMY BREAST WITH SEED LOCALIZATION Left 2014    Procedure: LUMPECTOMY BREAST WITH SEED LOCALIZATION;  Surgeon: Shonna Leung MD;  Location: [facility]     LUMPECTOMY BREAST WITH SENTINEL NODE, COMBINED Left 2014    Procedure: COMBINED LUMPECTOMY BREAST WITH SENTINEL NODE;  Surgeon: Shonna Leung MD;  Location: [facility]     PHACOEMULSIFICATION WITH STANDARD INTRAOCULAR LENS IMPLANT Right 2020    Procedure: Cataract Removal with Implant;  Surgeon: Ricki Luo MD;  Location: [facility]     PHACOEMULSIFICATION WITH STANDARD INTRAOCULAR LENS IMPLANT Left 2020    Procedure: Cataract Removal with Implant;  Surgeon: Ricki Luo MD;  Location: [facility]     SURGICAL HISTORY OF -       detached retina     Family History   Problem Relation Age of Onset     Hypertension Mother      Lipids Mother      Depression Mother         bipolar, also hysterectomy for fibroid     Genitourinary Problems Mother         Kidney  - on transplant list     Allergies Mother      Obesity Mother      Cancer Mother         skin cancer     Hyperlipidemia Mother      Mental Illness Mother         Bipolar     Lipids Brother      Allergies Father      Obesity Father      Cancer Father         skin cancer     Cancer Maternal Grandmother         ovarian     Cancer Maternal Grandfather         lung     Social History     Socioeconomic History     Marital status:      Spouse name: Not on file     Number of children: Not on file     Years of education: Not on file     Highest education level: Not on file   Occupational History     Not on file   Tobacco Use     Smoking status: Never     Smokeless tobacco: Never   Vaping Use      Vaping Use: Never used   Substance and Sexual Activity     Alcohol use: Yes     Alcohol/week: 0.0 standard drinks of alcohol     Comment: -monthly     Drug use: No     Sexual activity: Not Currently     Partners: Male     Birth control/protection: Post-menopausal   Other Topics Concern     Parent/sibling w/ CABG, MI or angioplasty before 65F 55M? No   Social History Narrative     Not on file     Social Determinants of Health     Financial Resource Strain: Not on file   Food Insecurity: Not on file   Transportation Needs: Not on file   Physical Activity: Not on file   Stress: Not on file   Social Connections: Not on file   Interpersonal Safety: Not on file   Housing Stability: Not on file        Allergies   Allergen Reactions     Yellow Dye Anaphylaxis     Yellow dye #5/#6      Current Outpatient Medications   Medication     EPINEPHrine (ADRENACLICK) 0.3 MG/0.3ML injection 2-pack     lisinopril (ZESTRIL) 20 MG tablet     montelukast (SINGULAIR) 10 MG tablet     omeprazole (PRILOSEC) 40 MG DR capsule     simvastatin (ZOCOR) 40 MG tablet     vitamin D3 (CHOLECALCIFEROL) 1.25 MG (55607 UT) capsule     timolol (TIMOPTIC) 0.5 % ophthalmic solution     No current facility-administered medications for this visit.           Exam:  BP (!) 153/93 (BP Location: Right arm, Patient Position: Sitting, Cuff Size: Adult Large)   Pulse 91   Wt 113.4 kg (250 lb)   LMP 02/17/2015   SpO2 95%   BMI 40.35 kg/m     Wt Readings from Last 4 Encounters:   10/23/23 113.4 kg (250 lb)   09/13/23 112.5 kg (248 lb)   01/08/23 117.9 kg (260 lb)   03/31/22 120.4 kg (265 lb 8 oz)     Constitutional: Healthy, alert, no distress and cooperative  Head: Normocephalic. EOMI.   Neck: Neck supple. No adenopathy. Thyroid symmetric, normal size.  Cardiovascular: Regular rhythm, no tachycardia  Respiratory: Lungs clear to auscultation bilaterally  Gastrointestinal: Non-distended  Neurologic: Normal speech   Psychiatric: Mentation appears normal  "    Labs:  CBC RESULTS: No results for input(s): \"WBC\", \"RBC\", \"HGB\", \"HCT\", \"MCV\", \"MCH\", \"MCHC\", \"RDW\", \"PLT\" in the last 79514 hours.  Recent Labs   Lab Test 09/22/23  0917 03/31/22  1654    141   POTASSIUM 4.5 4.1   CHLORIDE 103 108   CO2 28 27   ANIONGAP 8 6   * 101*   BUN 15.4 11   CR 0.72 0.72   ANGELLA 11.2* 10.2*     Lab Results   Component Value Date    ANGELLA 11.2 (H) 09/22/2023    ANGELLA 10.2 (H) 03/31/2022    ANGELLA 9.9 12/18/2020    ANGELLA 9.6 10/28/2020    ALBUMIN 4.4 09/22/2023    ICAW 5.6 (H) 09/29/2023    ALT 37 09/22/2023    PTHI 86 (H) 09/29/2023    AST 28 09/22/2023    BILITOTAL 0.4 09/22/2023    CR 0.72 09/22/2023    CR 0.72 03/31/2022    CR 0.77 12/18/2020     09/22/2023    TSH 2.57 12/17/2015    ALKPHOS 121 (H) 09/22/2023    HGB 12.2 02/13/2015       Results for orders placed during the hospital encounter of 10/10/23    DX Hip/Pelvis/Spine    Narrative  EXAM: DX WRIST/HEEL/RADIUS, DX HIP/PELVIS/SPINE  LOCATION: [facility]  DATE: 10/10/2023    INDICATION: Postmenopausal female with a history of breast cancer. History of hyperparathyroidism.  DEMOGRAPHICS: Age- 57 years. Gender- Female. Menopausal status- Postmenopausal.  COMPARISON: 11/21/2017.  TECHNIQUE: Dual-energy x-ray absorptiometry (DXA) performed with routine technique. Forearm DXA performed due to hyperparathyroidism.    FINDINGS:    DXA RESULTS  -Lumbar Spine: L1-L4: BMD: 1.305 g/cm2. T-score: 0.9. Z-score: 0.9. I suspect degenerative changes throughout the lumbar spine.  -RIGHT Hip Total: BMD: 0.975 g/cm2. T-score: -0.3. Z-score: -0.2.  -RIGHT Hip Femoral neck: BMD: 0.935 g/cm2. T-score: -0.7. Z-score: -0.3.  -LEFT Hip Total: BMD: 0.983 g/cm2. T-score: -0.2. Z-score: -0.1.  -LEFT Hip Femoral neck: BMD: 0.845 g/cm2. T-score: -1.4. Z-score: -0.9.  -LEFT Radius 33%: BMD: 0.620 g/cm2. T-score: -1.3. Z-score: -0.4.    WHO T-SCORE CRITERIA  -Normal: T score at or above -1 SD  -Osteopenia: T score between -1 " and -2.5 SD  -Osteoporosis: T score at or below -2.5 SD    The World Health Organization (WHO) criteria is applicable to perimenopausal females, postmenopausal females, and men aged 50 years or older.    INTERVAL CHANGE  -There has been a 1.8% decrease in L3-L4 lumbar spine BMD. Sclerotic changes likely seen throughout the lumbar spine.  -There has been a 6.4% decrease in bilateral hip BMD.    FRACTURE RISK  -FRAX Results: The 10 year probability of major osteoporotic fracture is 7.0%, and of hip fracture is 0.5%, based on left femoral neck BMD.    RECOMMENDATIONS  Consider treatment if major osteoporotic fracture score is greater than or equal to 20%, or if the hip fracture score is greater than or equal to 3%.    Impression  IMPRESSION: Low bone density (OSTEOPENIA). T score meets the WHO criteria for low bone density (osteopenia) at one or more measured sites. The risk of osteoporotic fracture increases approximately two-fold for each standard deviation decrease in T-score.      Again, thank you for allowing me to participate in the care of your patient.        Sincerely,        Farida Sanchez MD

## 2023-10-29 ENCOUNTER — HOSPITAL ENCOUNTER (OUTPATIENT)
Dept: CT IMAGING | Facility: CLINIC | Age: 58
Discharge: HOME | End: 2023-10-29
Attending: FAMILY MEDICINE | Admitting: FAMILY MEDICINE
Payer: COMMERCIAL

## 2023-10-29 DIAGNOSIS — E55.9 VITAMIN D DEFICIENCY: ICD-10-CM

## 2023-10-29 DIAGNOSIS — E21.3 HYPERPARATHYROIDISM (H): ICD-10-CM

## 2023-10-29 PROCEDURE — 82340 ASSAY OF CALCIUM IN URINE: CPT | Performed by: INTERNAL MEDICINE

## 2023-10-29 PROCEDURE — 250N000011 HC RX IP 250 OP 636: Performed by: FAMILY MEDICINE

## 2023-10-29 PROCEDURE — 70492 CT SFT TSUE NCK W/O & W/DYE: CPT

## 2023-10-29 PROCEDURE — 250N000009 HC RX 250: Performed by: FAMILY MEDICINE

## 2023-10-29 PROCEDURE — 81050 URINALYSIS VOLUME MEASURE: CPT | Performed by: INTERNAL MEDICINE

## 2023-10-29 PROCEDURE — 82570 ASSAY OF URINE CREATININE: CPT | Performed by: INTERNAL MEDICINE

## 2023-10-29 RX ORDER — IOPAMIDOL 755 MG/ML
67 INJECTION, SOLUTION INTRAVASCULAR ONCE
Status: COMPLETED | OUTPATIENT
Start: 2023-10-29 | End: 2023-10-29

## 2023-10-29 RX ADMIN — IOPAMIDOL 67 ML: 755 INJECTION, SOLUTION INTRAVENOUS at 13:46

## 2023-10-29 RX ADMIN — SODIUM CHLORIDE 80 ML: 9 INJECTION, SOLUTION INTRAVENOUS at 13:45

## 2023-10-30 ENCOUNTER — LAB (OUTPATIENT)
Dept: LAB | Facility: CLINIC | Age: 58
End: 2023-10-30
Payer: COMMERCIAL

## 2023-10-30 DIAGNOSIS — E83.52 HYPERCALCEMIA: Primary | ICD-10-CM

## 2023-10-30 LAB
COLLECT DURATION TIME UR: 24 H
CREAT 24H UR-MRATE: 1.68 G/SPEC (ref 0.72–1.51)
CREAT UR-MCNC: 55.6 MG/DL
SPECIMEN VOL UR: 3025 ML

## 2023-11-02 ENCOUNTER — TELEPHONE (OUTPATIENT)
Dept: ENDOCRINOLOGY | Facility: CLINIC | Age: 58
End: 2023-11-02
Payer: COMMERCIAL

## 2023-11-02 DIAGNOSIS — E21.3 HYPERPARATHYROIDISM (H): Primary | ICD-10-CM

## 2023-11-02 LAB
CALCIUM 24H UR-MRATE: 0.36 G/SPEC (ref 0.1–0.3)
CALCIUM UR-MCNC: 11.8 MG/DL
COLLECT DURATION TIME UR: 24 H
SPECIMEN VOL UR: 3025 ML

## 2023-11-02 NOTE — TELEPHONE ENCOUNTER
Pt with hypercalcemia and elevated PTHI    DXA radius T-score -1.3    Urine Calcium 356 (elevated)    CT parathyroid negative    Order for parathyroid US    Farida Sanchez MD  Staff Physician  Division of Endocrinology  [facility]  Pager #[phone]

## 2023-11-09 ENCOUNTER — HOSPITAL ENCOUNTER (OUTPATIENT)
Dept: ULTRASOUND IMAGING | Facility: CLINIC | Age: 58
Discharge: HOME | End: 2023-11-09
Admitting: INTERNAL MEDICINE
Payer: COMMERCIAL

## 2023-11-09 DIAGNOSIS — E21.3 HYPERPARATHYROIDISM (H): ICD-10-CM

## 2023-11-09 PROCEDURE — 76536 US EXAM OF HEAD AND NECK: CPT

## 2023-12-07 ENCOUNTER — TELEPHONE (OUTPATIENT)
Dept: ENDOCRINOLOGY | Facility: CLINIC | Age: 58
End: 2023-12-07
Payer: COMMERCIAL

## 2023-12-07 DIAGNOSIS — E21.3 HYPERPARATHYROIDISM (H): ICD-10-CM

## 2023-12-07 DIAGNOSIS — E83.52 HYPERCALCEMIA: Primary | ICD-10-CM

## 2023-12-07 NOTE — TELEPHONE ENCOUNTER
Correct nuc med order for hyperparathyroidism    Farida Sanchez MD  Staff Physician  Division of Endocrinology  [facility]  Pager #[phone]

## 2023-12-08 ENCOUNTER — HOSPITAL ENCOUNTER (OUTPATIENT)
Dept: NUCLEAR MEDICINE | Facility: CLINIC | Age: 58
Setting detail: NUCLEAR MEDICINE
Discharge: HOME | End: 2023-12-08
Attending: FAMILY MEDICINE
Payer: COMMERCIAL

## 2023-12-08 ENCOUNTER — TELEPHONE (OUTPATIENT)
Dept: ENDOCRINOLOGY | Facility: CLINIC | Age: 58
End: 2023-12-08
Payer: COMMERCIAL

## 2023-12-08 DIAGNOSIS — E21.3 HYPERPARATHYROIDISM (H): ICD-10-CM

## 2023-12-08 DIAGNOSIS — E83.52 HYPERCALCEMIA: ICD-10-CM

## 2023-12-08 DIAGNOSIS — E04.2 NON-TOXIC MULTINODULAR GOITER: Primary | ICD-10-CM

## 2023-12-08 PROCEDURE — A9500 TC99M SESTAMIBI: HCPCS | Performed by: FAMILY MEDICINE

## 2023-12-08 PROCEDURE — 78070 PARATHYROID PLANAR IMAGING: CPT

## 2023-12-08 PROCEDURE — 343N000001 HC RX 343: Performed by: FAMILY MEDICINE

## 2023-12-08 RX ADMIN — Medication 26.1 MILLICURIE: at 07:40

## 2023-12-08 NOTE — TELEPHONE ENCOUNTER
As discussed on the phone: the parathyroid scan shows increased uptake in area of the right lower lobe. This is going to be helpful for the parathyroid surgery. Prior to referral to a surgeon, we should obtain a biopsy of the dominant nodule in the mid right thyroid lobe.      Farida Sanchez MD  Staff Physician  Division of Endocrinology  [facility]  Pager #[phone]

## 2023-12-11 ENCOUNTER — TELEPHONE (OUTPATIENT)
Dept: ENDOCRINOLOGY | Facility: CLINIC | Age: 58
End: 2023-12-11
Payer: COMMERCIAL

## 2023-12-11 NOTE — TELEPHONE ENCOUNTER
12/11 Called and left voicemail, provided phone number [phone] to schedule fna biopsy.     Anyi dimas Procedure   Orthopedics, Podiatry, Sports Medicine, Ent ,Eye , Audiology, Adult Endocrine & Diabetes, Nutrition & Medication Therapy Management Specialties   [facility]        ----- Message from Keerthi Gaxiola RN sent at 12/11/2023  9:30 AM CST -----  Regarding: FW: FNA thyroid    ----- Message -----  From: Farida Sanchez MD  Sent: 12/8/2023   2:13 PM CST  To: [facility] Endocrinology Adult [place]  Subject: FNA thyroid                                      Please schedule FNA thyroid soon, pt is aware, I talked to her on the phone, thanks Farida Sanchez MD  Staff Physician  Division of Endocrinology  [facility]  Pager #[phone]

## 2023-12-15 NOTE — TELEPHONE ENCOUNTER
12/15 Appointment is currently scheduled.     Anyi dimas Complex   Orthopedics, Podiatry, Sports Medicine, Ent ,Eye , Audiology, Adult Endocrine & Diabetes, Nutrition & Medication Therapy Management Specialties   [facility]

## 2024-01-03 ENCOUNTER — HOSPITAL ENCOUNTER (OUTPATIENT)
Dept: ULTRASOUND IMAGING | Facility: CLINIC | Age: 59
Discharge: HOME | End: 2024-01-03
Attending: FAMILY MEDICINE | Admitting: FAMILY MEDICINE
Payer: COMMERCIAL

## 2024-01-03 VITALS — DIASTOLIC BLOOD PRESSURE: 85 MMHG | SYSTOLIC BLOOD PRESSURE: 139 MMHG

## 2024-01-03 DIAGNOSIS — E21.3 HYPERPARATHYROIDISM (H): ICD-10-CM

## 2024-01-03 DIAGNOSIS — E04.2 NON-TOXIC MULTINODULAR GOITER: ICD-10-CM

## 2024-01-03 PROCEDURE — 88173 CYTOPATH EVAL FNA REPORT: CPT | Mod: TC | Performed by: INTERNAL MEDICINE

## 2024-01-03 PROCEDURE — 250N000009 HC RX 250: Performed by: RADIOLOGY

## 2024-01-03 PROCEDURE — 88173 CYTOPATH EVAL FNA REPORT: CPT | Mod: 26 | Performed by: PATHOLOGY

## 2024-01-03 PROCEDURE — 272N000431 US BIOPSY THYROID FINE NEEDLE ASPIRATION

## 2024-01-03 RX ADMIN — LIDOCAINE HYDROCHLORIDE 9 ML: 10 INJECTION, SOLUTION EPIDURAL; INFILTRATION; INTRACAUDAL; PERINEURAL at 13:07

## 2024-01-04 LAB
PATH REPORT.COMMENTS IMP SPEC: NORMAL
PATH REPORT.COMMENTS IMP SPEC: NORMAL
PATH REPORT.FINAL DX SPEC: NORMAL
PATH REPORT.GROSS SPEC: NORMAL
PATH REPORT.MICROSCOPIC SPEC OTHER STN: NORMAL

## 2024-02-19 ENCOUNTER — TELEPHONE (OUTPATIENT)
Dept: ENDOCRINOLOGY | Facility: CLINIC | Age: 59
End: 2024-02-19
Payer: COMMERCIAL

## 2024-02-19 NOTE — TELEPHONE ENCOUNTER
Pt with primary hyperparathyrodism, with lesion in the posterior inferior thyroid lobe, right thyroid nodule measuring 3.1 x 1.9x1.8 cm. FNA 1/4/24 benign, serum calciu 11.2 ionized Calcim 5.6 cre nl, phos 2.6; indication urine Calcium 0.36, DEXA T-score -1.4 left fem neck, TSH 2.5 8 years ago, Sestamibi scan increase uptake left posterior lobe, CT parathyroid tiny are right thyroid gland enhancement FNA right thyroid nodule benign

## 2024-03-18 ENCOUNTER — VIRTUAL VISIT (OUTPATIENT)
Dept: ENDOCRINOLOGY | Facility: CLINIC | Age: 59
End: 2024-03-18
Payer: COMMERCIAL

## 2024-03-18 DIAGNOSIS — E21.3 HYPERPARATHYROIDISM (H): Primary | ICD-10-CM

## 2024-03-18 PROCEDURE — 99214 OFFICE O/P EST MOD 30 MIN: CPT | Mod: 95 | Performed by: INTERNAL MEDICINE

## 2024-03-18 ASSESSMENT — PAIN SCALES - GENERAL: PAINLEVEL: NO PAIN (0)

## 2024-03-18 NOTE — NURSING NOTE
Is the patient currently in the state of [place]? YES    Visit mode:VIDEO    If the visit is dropped, the patient can be reconnected by: VIDEO VISIT: Text to cell phone:   Telephone Information:   Mobile [phone]       Will anyone else be joining the visit? NO  (If patient encounters technical issues they should call [phone]    How would you like to obtain your AVS? MyChart    Are changes needed to the allergy or medication list? No    Reason for visit: RECHECK Shelby Kocher VVF

## 2024-03-18 NOTE — PROGRESS NOTES
"Virtual Visit Details    Type of service:  Video Visit     Originating Location (pt. Location): {video visit patient location:069809::\"Home\"}  {PROVIDER LOCATION On-site should be selected for visits conducted from your clinic location or adjoining [facility] hospital, academic office, or other nearby [facility] building. Off-site should be selected for all other provider locations, including home:[id number]}  Distant Location (provider location):  {virtual location provider:775179}  Platform used for Video Visit: {Virtual Visit Platforms:356437::\"PICS Auditing\"}    "

## 2024-03-18 NOTE — PROGRESS NOTES
Endocrine Clinic Consult      Virtual Visit Details    Type of service:  Video Visit     Originating Location (pt. Location): Home    Distant Location (provider location):  Off-site  Platform used for Video Visit: [facility]      Follow up for primary hyperparathyroidism      Assessment   Ann Vargas is a 58 year old female who is here for evaluation of hypercalcemia likely secondary to primary hyperparathyroidism with perhaps some contribution of vitamin D deficiency.   Pt was noted with hypercalciuria. Therefore parathyroid surgery is indicated. Location studies show faint uptake at the right inferior thyroid lobe on CT parathyroid and sestamibi scan. A dominant 3.1  cm thyroid nodule in the  right lobe was biopsied and found to  be benign.    Plan  - referral to ENT for eval for parathyroidiectom  - cont Vitamin D 2000 units daily    Return to clinic in 6 months.      Farida Sanchez MD  Endocrinology and Diabetes  Telephone contact:  [facility] [phone]  [facility] [phone]          Interval histroy  6 m follow up    Ms. Vargas is referred by her PCP for hypercalcemia with a calcium of 11.2 that was noted on screening labs obtained in 9/2022. At the time, she was otherwise asymptomatic and feeling well, and was not taking calcium supplements. Further lab work up revealed PTH 86, vitamin D level 17, iCal 5.6, creatinine 0.72. DEXA scan with osteopenia at one or more measured sites. She was started on vitamin D supplementation and referred to endocrinology for further recommendations.      Calcium and Vit D supplements : 50,000 international unit(s) weekly starting 10/3/2023  Other supplements: none    Fractures: 1/1/2023 toe fracture after dropping weight on foot, no fragility fractures    Past Medical History:   Diagnosis Date    Benign essential hypertension 11/9/2020    Breast cancer (H)     Chronic rhinitis 10/23/2006    10/23/2006 Had been  on shots--now on singulair  Working well    Complication of anesthesia     Diagnostic skin and sensitization tests (aka ALLERGENS)     3/11/15 IgE tests NEGATIVE for orange, tomato, strawberry, apple--cherry, g. pepper and mushrrom are pending.    GERD (gastroesophageal reflux disease)     Hyperlipidemia     PONV (postoperative nausea and vomiting)     Unexplained endometrial cells on cervical cytology 12/17/15    2/15/16 emb scheduled.      Past Surgical History:   Procedure Laterality Date    BIOPSY BREAST      C/SECTION, CLASSICAL  Aug 93 &     , Classical x 2    EYE SURGERY  ?    detachted retina surgery    LUMPECTOMY BREAST      LUMPECTOMY BREAST WITH SEED LOCALIZATION Left 2014    Procedure: LUMPECTOMY BREAST WITH SEED LOCALIZATION;  Surgeon: Shonna Leung MD;  Location: [facility]    LUMPECTOMY BREAST WITH SENTINEL NODE, COMBINED Left 2014    Procedure: COMBINED LUMPECTOMY BREAST WITH SENTINEL NODE;  Surgeon: Shonna Leung MD;  Location: [facility]    PHACOEMULSIFICATION WITH STANDARD INTRAOCULAR LENS IMPLANT Right 2020    Procedure: Cataract Removal with Implant;  Surgeon: Ricki Luo MD;  Location: [facility]    PHACOEMULSIFICATION WITH STANDARD INTRAOCULAR LENS IMPLANT Left 2020    Procedure: Cataract Removal with Implant;  Surgeon: Ricki Luo MD;  Location: [facility]    SURGICAL HISTORY OF -       detached retina     Family History   Problem Relation Age of Onset    Hypertension Mother     Lipids Mother     Depression Mother         bipolar, also hysterectomy for fibroid    Genitourinary Problems Mother         Kidney  - on transplant list    Allergies Mother     Obesity Mother     Cancer Mother         skin cancer    Hyperlipidemia Mother     Mental Illness Mother         Bipolar    Lipids Brother     Allergies Father     Obesity Father     Cancer Father         skin cancer    Cancer Maternal Grandmother         ovarian     Cancer Maternal Grandfather         lung     Social History     Socioeconomic History    Marital status:      Spouse name: Not on file    Number of children: Not on file    Years of education: Not on file    Highest education level: Not on file   Occupational History    Not on file   Tobacco Use    Smoking status: Never    Smokeless tobacco: Never   Vaping Use    Vaping Use: Never used   Substance and Sexual Activity    Alcohol use: Yes     Alcohol/week: 0.0 standard drinks of alcohol     Comment: -monthly    Drug use: No    Sexual activity: Not Currently     Partners: Male     Birth control/protection: Post-menopausal   Other Topics Concern    Parent/sibling w/ CABG, MI or angioplasty before 65F 55M? No   Social History Narrative    Not on file     Social Determinants of Health     Financial Resource Strain: Not on file   Food Insecurity: Not on file   Transportation Needs: Not on file   Physical Activity: Not on file   Stress: Not on file   Social Connections: Not on file   Interpersonal Safety: Not on file   Housing Stability: Not on file        Allergies   Allergen Reactions    Yellow Dye Anaphylaxis     Yellow dye #5/#6      Current Outpatient Medications   Medication    EPINEPHrine (ADRENACLICK) 0.3 MG/0.3ML injection 2-pack    lisinopril (ZESTRIL) 20 MG tablet    montelukast (SINGULAIR) 10 MG tablet    omeprazole (PRILOSEC) 40 MG DR capsule    simvastatin (ZOCOR) 40 MG tablet    timolol (TIMOPTIC) 0.5 % ophthalmic solution     No current facility-administered medications for this visit.         Exam:    Reported vitals:  There were no vitals taken for this visit.   healthy, alert and no distress  PSYCH: Alert and oriented times 3; coherent speech, normal   rate and volume, able to articulate logical thoughts, able   to abstract reason, no tangential thoughts, no hallucinations   or delusions  Her affect is normal and pleasant  RESP: No cough, no audible wheezing, able to talk in full  sentences  Remainder of exam unable to be completed due to telephone visits       Labs:  Lab Results   Component Value Date    ANGELLA 10.9 (H) 10/23/2023    ANGELLA 11.2 (H) 09/22/2023    ANGELLA 10.2 (H) 03/31/2022    ANGELLA 9.9 12/18/2020    ALBUMIN 4.2 10/23/2023    ICAW 5.6 (H) 09/29/2023    ALT 37 09/22/2023    PHOS 2.6 10/23/2023    PTHI 83 (H) 10/23/2023    PTHI 86 (H) 09/29/2023    AST 28 09/22/2023    BILITOTAL 0.4 09/22/2023    CR 0.72 09/22/2023    CR 0.72 03/31/2022    CR 0.77 12/18/2020     09/22/2023    TSH 2.57 12/17/2015    ALKPHOS 121 (H) 09/22/2023    VITDT 17 (L) 09/29/2023    HGB 12.2 02/13/2015                   Results for orders placed during the hospital encounter of 11/09/23    US Head Neck Soft Tissue    Narrative  US HEAD AND NECK SOFT TISSUE 11/9/2023 2:29 PM    CLINICAL HISTORY: Patient with primary hyperparathyroidism, negative  parathyroid CT. Evaluate for enlarged parathyroid. Hyperparathyroidism  (H24).    TECHNIQUE: Thyroid ultrasound.    COMPARISON: 2/13/2015.    FINDINGS:  RIGHT lobe: 5.9 x 2 x 1.9 cm. Homogeneous echotexture.  Isthmus: 2 mm.  LEFT lobe: 4.8 x 1.6 x 1.5 cm. Homogeneous echotexture.    NECK: No cervical lymphadenopathy. No parathyroid mass or nodule is  seen.    NODULES:    Nodule 1: Right mid pole thyroid nodule measuring 31 x 19 x 18 mm  Composition: Solid or almost completely solid, 2 points  Echogenicity: Hyperechoic or isoechoic, 1 point  Shape: Not taller than wide, 0 points  Margin: Smooth, 0 points  Echogenic Foci: Punctate echoic foci, 3 points  Point Total: 4-6 points. TI-RADS 4. If 1.5 cm or larger, recommend  FNA; if 1 cm or larger, follow up US (annually for 5 years).    Nodule 2: Right lower pole thyroid nodule measuring 8 x 7 x 6 mm  Composition: Solid or almost completely solid, 2 points  Echogenicity: Hypoechoic, 2 points  Shape: Not taller than wide, 0 points  Margin: Smooth, 0 points  Echogenic Foci: None, or large comet-tail artifacts, 0 points  Point  Total: 4-6 points. TI-RADS 4. If 1.5 cm or larger, recommend  FNA; if 1 cm or larger, follow up US (annually for 5 years).    Nodule 3: Left mid pole thyroid nodule measuring 9 x 7 x 7 mm  Composition: Unable to determine, 2 points  Echogenicity: Hypoechoic, 2 points  Shape: Not taller than wide, 0 points  Margin: Smooth, 0 points  Echogenic Foci: None, or large comet-tail artifacts, 0 points  Point Total: 4-6 points. TI-RADS 4. If 1.5 cm or larger, recommend  FNA; if 1 cm or larger, follow up US (annually for 5 years).    Impression  IMPRESSION:  1.  Bilateral thyroid nodules. Dominant right thyroid nodule measuring  up to 3.1 cm meets criteria for fine needle aspiration.  2.  No sonographic evidence of a parathyroid lesion.    Nodules are characterized per  ACR Thyroid Imaging, Reporting and Data System (TI-RADS): White Paper  of the ACR TI-RADS Committee  Cheikh Diego., et al. Journal of the American College of  Radiology 2017. Volume 14 (2017), Issue 5, 344-461.    ROXANA ARBOLEDA MD      SYSTEM ID:  [id number]        Results for orders placed during the hospital encounter of 12/08/23    NM Parathyroid Planar Imaging Single    Narrative  EXAM: NM PARATHYROID PLANAR IMAGING SINGLE ISOTOPE  LOCATION: [facility]  DATE: 12/8/2023    INDICATION: Primary hyperparathyroidism  COMPARISON: Parathyroid CT dated 10/29/2023.  TECHNIQUE: 26.1 mCi technetium-99m sestamibi, IV. Anterior planar images of the neck and chest at 15 minutes and 2 hours post injection.    FINDINGS: Subtle radiotracer uptake along the posteroinferior aspect of the right inferior thyroid lobe suspicious for a parathyroid adenoma.    Impression  IMPRESSION:    Findings suspicious for a parathyroid adenoma along the posteroinferior aspect of the right inferior thyroid lobe      Results for orders placed during the hospital encounter of 11/09/23    US Head Neck Soft Tissue    Narrative  US HEAD AND NECK SOFT TISSUE  11/9/2023 2:29 PM    CLINICAL HISTORY: Patient with primary hyperparathyroidism, negative  parathyroid CT. Evaluate for enlarged parathyroid. Hyperparathyroidism  (H24).    TECHNIQUE: Thyroid ultrasound.    COMPARISON: 2/13/2015.    FINDINGS:  RIGHT lobe: 5.9 x 2 x 1.9 cm. Homogeneous echotexture.  Isthmus: 2 mm.  LEFT lobe: 4.8 x 1.6 x 1.5 cm. Homogeneous echotexture.    NECK: No cervical lymphadenopathy. No parathyroid mass or nodule is  seen.    NODULES:    Nodule 1: Right mid pole thyroid nodule measuring 31 x 19 x 18 mm  Composition: Solid or almost completely solid, 2 points  Echogenicity: Hyperechoic or isoechoic, 1 point  Shape: Not taller than wide, 0 points  Margin: Smooth, 0 points  Echogenic Foci: Punctate echoic foci, 3 points  Point Total: 4-6 points. TI-RADS 4. If 1.5 cm or larger, recommend  FNA; if 1 cm or larger, follow up US (annually for 5 years).    Nodule 2: Right lower pole thyroid nodule measuring 8 x 7 x 6 mm  Composition: Solid or almost completely solid, 2 points  Echogenicity: Hypoechoic, 2 points  Shape: Not taller than wide, 0 points  Margin: Smooth, 0 points  Echogenic Foci: None, or large comet-tail artifacts, 0 points  Point Total: 4-6 points. TI-RADS 4. If 1.5 cm or larger, recommend  FNA; if 1 cm or larger, follow up US (annually for 5 years).    Nodule 3: Left mid pole thyroid nodule measuring 9 x 7 x 7 mm  Composition: Unable to determine, 2 points  Echogenicity: Hypoechoic, 2 points  Shape: Not taller than wide, 0 points  Margin: Smooth, 0 points  Echogenic Foci: None, or large comet-tail artifacts, 0 points  Point Total: 4-6 points. TI-RADS 4. If 1.5 cm or larger, recommend  FNA; if 1 cm or larger, follow up US (annually for 5 years).    Impression  IMPRESSION:  1.  Bilateral thyroid nodules. Dominant right thyroid nodule measuring  up to 3.1 cm meets criteria for fine needle aspiration.  2.  No sonographic evidence of a parathyroid lesion.    Nodules are characterized per  ACR  Thyroid Imaging, Reporting and Data System (TI-RADS): White Paper  of the ACR TI-RADS Committee  Cheikh Diego, et al. Journal of the American College of  Radiology 2017. Volume 14 (2017), Issue 5, 708-638.    ROXANA ARBOLEDA MD      SYSTEM ID:  [id number]        Results for orders placed during the hospital encounter of 10/10/23    DX Hip/Pelvis/Spine    Narrative  EXAM: DX WRIST/HEEL/RADIUS, DX HIP/PELVIS/SPINE  LOCATION: [facility]  DATE: 10/10/2023    INDICATION: Postmenopausal female with a history of breast cancer. History of hyperparathyroidism.  DEMOGRAPHICS: Age- 57 years. Gender- Female. Menopausal status- Postmenopausal.  COMPARISON: 11/21/2017.  TECHNIQUE: Dual-energy x-ray absorptiometry (DXA) performed with routine technique. Forearm DXA performed due to hyperparathyroidism.    FINDINGS:    DXA RESULTS  -Lumbar Spine: L1-L4: BMD: 1.305 g/cm2. T-score: 0.9. Z-score: 0.9. I suspect degenerative changes throughout the lumbar spine.  -RIGHT Hip Total: BMD: 0.975 g/cm2. T-score: -0.3. Z-score: -0.2.  -RIGHT Hip Femoral neck: BMD: 0.935 g/cm2. T-score: -0.7. Z-score: -0.3.  -LEFT Hip Total: BMD: 0.983 g/cm2. T-score: -0.2. Z-score: -0.1.  -LEFT Hip Femoral neck: BMD: 0.845 g/cm2. T-score: -1.4. Z-score: -0.9.  -LEFT Radius 33%: BMD: 0.620 g/cm2. T-score: -1.3. Z-score: -0.4.    WHO T-SCORE CRITERIA  -Normal: T score at or above -1 SD  -Osteopenia: T score between -1 and -2.5 SD  -Osteoporosis: T score at or below -2.5 SD    The World Health Organization (WHO) criteria is applicable to perimenopausal females, postmenopausal females, and men aged 50 years or older.    INTERVAL CHANGE  -There has been a 1.8% decrease in L3-L4 lumbar spine BMD. Sclerotic changes likely seen throughout the lumbar spine.  -There has been a 6.4% decrease in bilateral hip BMD.    FRACTURE RISK  -FRAX Results: The 10 year probability of major osteoporotic fracture is 7.0%, and of hip fracture is  0.5%, based on left femoral neck BMD.    RECOMMENDATIONS  Consider treatment if major osteoporotic fracture score is greater than or equal to 20%, or if the hip fracture score is greater than or equal to 3%.    Impression  IMPRESSION: Low bone density (OSTEOPENIA). T score meets the WHO criteria for low bone density (osteopenia) at one or more measured sites. The risk of osteoporotic fracture increases approximately two-fold for each standard deviation decrease in T-score.

## 2024-03-18 NOTE — LETTER
3/18/2024         RE: Ann Vargas  [id number]  [facility] [address]        Dear Colleague,    Thank you for referring your patient, Ann Vargas, to the [facility]. Please see a copy of my visit note below.        Endocrine Clinic Consult      Virtual Visit Details    Type of service:  Video Visit     Originating Location (pt. Location): Home    Distant Location (provider location):  Off-site  Platform used for Video Visit: [facility]      Follow up for primary hyperparathyroidism      Assessment  Ann Vargas is a 58 year old female who is here for evaluation of hypercalcemia likely secondary to primary hyperparathyroidism with perhaps some contribution of vitamin D deficiency.   Pt was noted with hypercalciuria. Therefore parathyroid surgery is indicated. Location studies show faint uptake at the right inferior thyroid lobe on CT parathyroid and sestamibi scan. A dominant 3.1  cm thyroid nodule in the  right lobe was biopsied and found to  be benign.    Plan  - referral to ENT for eval for parathyroidiectom  - cont Vitamin D 2000 units daily    Return to clinic in 6 months.      Farida Sanchez MD  Endocrinology and Diabetes  Telephone contact:  [facility] [phone]  [facility] [phone]          Interval histroy  6 m follow up    Ms. Vargas is referred by her PCP for hypercalcemia with a calcium of 11.2 that was noted on screening labs obtained in 9/2022. At the time, she was otherwise asymptomatic and feeling well, and was not taking calcium supplements. Further lab work up revealed PTH 86, vitamin D level 17, iCal 5.6, creatinine 0.72. DEXA scan with osteopenia at one or more measured sites. She was started on vitamin D supplementation and referred to endocrinology for further recommendations.      Calcium and Vit D supplements : 50,000 international unit(s) weekly starting 10/3/2023  Other supplements:  none    Fractures: 2023 toe fracture after dropping weight on foot, no fragility fractures    Past Medical History:   Diagnosis Date     Benign essential hypertension 2020     Breast cancer (H)      Chronic rhinitis 10/23/2006    10/23/2006 Had been on shots--now on singulair  Working well     Complication of anesthesia      Diagnostic skin and sensitization tests (aka ALLERGENS)     3/11/15 IgE tests NEGATIVE for orange, tomato, strawberry, apple--cherry, g. pepper and mushrrom are pending.     GERD (gastroesophageal reflux disease)      Hyperlipidemia      PONV (postoperative nausea and vomiting)      Unexplained endometrial cells on cervical cytology 12/17/15    2/15/16 emb scheduled.      Past Surgical History:   Procedure Laterality Date     BIOPSY BREAST       C/SECTION, CLASSICAL  Aug 93 &     , Classical x 2     EYE SURGERY  ?    detachted retina surgery     LUMPECTOMY BREAST       LUMPECTOMY BREAST WITH SEED LOCALIZATION Left 2014    Procedure: LUMPECTOMY BREAST WITH SEED LOCALIZATION;  Surgeon: Shonna Leung MD;  Location: [facility]     LUMPECTOMY BREAST WITH SENTINEL NODE, COMBINED Left 2014    Procedure: COMBINED LUMPECTOMY BREAST WITH SENTINEL NODE;  Surgeon: Shonna Leung MD;  Location: [facility]     PHACOEMULSIFICATION WITH STANDARD INTRAOCULAR LENS IMPLANT Right 2020    Procedure: Cataract Removal with Implant;  Surgeon: Ricki Luo MD;  Location: [facility]     PHACOEMULSIFICATION WITH STANDARD INTRAOCULAR LENS IMPLANT Left 2020    Procedure: Cataract Removal with Implant;  Surgeon: Ricki Luo MD;  Location: [facility]     SURGICAL HISTORY OF -       detached retina     Family History   Problem Relation Age of Onset     Hypertension Mother      Lipids Mother      Depression Mother         bipolar, also hysterectomy for fibroid     Genitourinary Problems Mother         Kidney  - on transplant list     Allergies  Mother      Obesity Mother      Cancer Mother         skin cancer     Hyperlipidemia Mother      Mental Illness Mother         Bipolar     Lipids Brother      Allergies Father      Obesity Father      Cancer Father         skin cancer     Cancer Maternal Grandmother         ovarian     Cancer Maternal Grandfather         lung     Social History     Socioeconomic History     Marital status:      Spouse name: Not on file     Number of children: Not on file     Years of education: Not on file     Highest education level: Not on file   Occupational History     Not on file   Tobacco Use     Smoking status: Never     Smokeless tobacco: Never   Vaping Use     Vaping Use: Never used   Substance and Sexual Activity     Alcohol use: Yes     Alcohol/week: 0.0 standard drinks of alcohol     Comment: -monthly     Drug use: No     Sexual activity: Not Currently     Partners: Male     Birth control/protection: Post-menopausal   Other Topics Concern     Parent/sibling w/ CABG, MI or angioplasty before 65F 55M? No   Social History Narrative     Not on file     Social Determinants of Health     Financial Resource Strain: Not on file   Food Insecurity: Not on file   Transportation Needs: Not on file   Physical Activity: Not on file   Stress: Not on file   Social Connections: Not on file   Interpersonal Safety: Not on file   Housing Stability: Not on file        Allergies   Allergen Reactions     Yellow Dye Anaphylaxis     Yellow dye #5/#6      Current Outpatient Medications   Medication     EPINEPHrine (ADRENACLICK) 0.3 MG/0.3ML injection 2-pack     lisinopril (ZESTRIL) 20 MG tablet     montelukast (SINGULAIR) 10 MG tablet     omeprazole (PRILOSEC) 40 MG DR capsule     simvastatin (ZOCOR) 40 MG tablet     timolol (TIMOPTIC) 0.5 % ophthalmic solution     No current facility-administered medications for this visit.         Exam:    Reported vitals:  There were no vitals taken for this visit.   healthy, alert and no  distress  PSYCH: Alert and oriented times 3; coherent speech, normal   rate and volume, able to articulate logical thoughts, able   to abstract reason, no tangential thoughts, no hallucinations   or delusions  Her affect is normal and pleasant  RESP: No cough, no audible wheezing, able to talk in full sentences  Remainder of exam unable to be completed due to telephone visits       Labs:  Lab Results   Component Value Date    ANGELLA 10.9 (H) 10/23/2023    ANGELLA 11.2 (H) 09/22/2023    ANGELLA 10.2 (H) 03/31/2022    ANGELLA 9.9 12/18/2020    ALBUMIN 4.2 10/23/2023    ICAW 5.6 (H) 09/29/2023    ALT 37 09/22/2023    PHOS 2.6 10/23/2023    PTHI 83 (H) 10/23/2023    PTHI 86 (H) 09/29/2023    AST 28 09/22/2023    BILITOTAL 0.4 09/22/2023    CR 0.72 09/22/2023    CR 0.72 03/31/2022    CR 0.77 12/18/2020     09/22/2023    TSH 2.57 12/17/2015    ALKPHOS 121 (H) 09/22/2023    VITDT 17 (L) 09/29/2023    HGB 12.2 02/13/2015                   Results for orders placed during the hospital encounter of 11/09/23    US Head Neck Soft Tissue    Narrative  US HEAD AND NECK SOFT TISSUE 11/9/2023 2:29 PM    CLINICAL HISTORY: Patient with primary hyperparathyroidism, negative  parathyroid CT. Evaluate for enlarged parathyroid. Hyperparathyroidism  (H24).    TECHNIQUE: Thyroid ultrasound.    COMPARISON: 2/13/2015.    FINDINGS:  RIGHT lobe: 5.9 x 2 x 1.9 cm. Homogeneous echotexture.  Isthmus: 2 mm.  LEFT lobe: 4.8 x 1.6 x 1.5 cm. Homogeneous echotexture.    NECK: No cervical lymphadenopathy. No parathyroid mass or nodule is  seen.    NODULES:    Nodule 1: Right mid pole thyroid nodule measuring 31 x 19 x 18 mm  Composition: Solid or almost completely solid, 2 points  Echogenicity: Hyperechoic or isoechoic, 1 point  Shape: Not taller than wide, 0 points  Margin: Smooth, 0 points  Echogenic Foci: Punctate echoic foci, 3 points  Point Total: 4-6 points. TI-RADS 4. If 1.5 cm or larger, recommend  FNA; if 1 cm or larger, follow up US (annually for 5  years).    Nodule 2: Right lower pole thyroid nodule measuring 8 x 7 x 6 mm  Composition: Solid or almost completely solid, 2 points  Echogenicity: Hypoechoic, 2 points  Shape: Not taller than wide, 0 points  Margin: Smooth, 0 points  Echogenic Foci: None, or large comet-tail artifacts, 0 points  Point Total: 4-6 points. TI-RADS 4. If 1.5 cm or larger, recommend  FNA; if 1 cm or larger, follow up US (annually for 5 years).    Nodule 3: Left mid pole thyroid nodule measuring 9 x 7 x 7 mm  Composition: Unable to determine, 2 points  Echogenicity: Hypoechoic, 2 points  Shape: Not taller than wide, 0 points  Margin: Smooth, 0 points  Echogenic Foci: None, or large comet-tail artifacts, 0 points  Point Total: 4-6 points. TI-RADS 4. If 1.5 cm or larger, recommend  FNA; if 1 cm or larger, follow up US (annually for 5 years).    Impression  IMPRESSION:  1.  Bilateral thyroid nodules. Dominant right thyroid nodule measuring  up to 3.1 cm meets criteria for fine needle aspiration.  2.  No sonographic evidence of a parathyroid lesion.    Nodules are characterized per  ACR Thyroid Imaging, Reporting and Data System (TI-RADS): White Paper  of the ACR TI-RADS Committee  RosendasCheikh rabago., et al. Journal of the American College of  Radiology 2017. Volume 14 (2017), Issue 5, 122-191.    ROXANA ARBOLEDA MD      SYSTEM ID:  [id number]        Results for orders placed during the hospital encounter of 12/08/23    NM Parathyroid Planar Imaging Single    Narrative  EXAM: NM PARATHYROID PLANAR IMAGING SINGLE ISOTOPE  LOCATION: [facility]  DATE: 12/8/2023    INDICATION: Primary hyperparathyroidism  COMPARISON: Parathyroid CT dated 10/29/2023.  TECHNIQUE: 26.1 mCi technetium-99m sestamibi, IV. Anterior planar images of the neck and chest at 15 minutes and 2 hours post injection.    FINDINGS: Subtle radiotracer uptake along the posteroinferior aspect of the right inferior thyroid lobe suspicious for a  parathyroid adenoma.    Impression  IMPRESSION:    Findings suspicious for a parathyroid adenoma along the posteroinferior aspect of the right inferior thyroid lobe      Results for orders placed during the hospital encounter of 11/09/23    US Head Neck Soft Tissue    Narrative  US HEAD AND NECK SOFT TISSUE 11/9/2023 2:29 PM    CLINICAL HISTORY: Patient with primary hyperparathyroidism, negative  parathyroid CT. Evaluate for enlarged parathyroid. Hyperparathyroidism  (H24).    TECHNIQUE: Thyroid ultrasound.    COMPARISON: 2/13/2015.    FINDINGS:  RIGHT lobe: 5.9 x 2 x 1.9 cm. Homogeneous echotexture.  Isthmus: 2 mm.  LEFT lobe: 4.8 x 1.6 x 1.5 cm. Homogeneous echotexture.    NECK: No cervical lymphadenopathy. No parathyroid mass or nodule is  seen.    NODULES:    Nodule 1: Right mid pole thyroid nodule measuring 31 x 19 x 18 mm  Composition: Solid or almost completely solid, 2 points  Echogenicity: Hyperechoic or isoechoic, 1 point  Shape: Not taller than wide, 0 points  Margin: Smooth, 0 points  Echogenic Foci: Punctate echoic foci, 3 points  Point Total: 4-6 points. TI-RADS 4. If 1.5 cm or larger, recommend  FNA; if 1 cm or larger, follow up US (annually for 5 years).    Nodule 2: Right lower pole thyroid nodule measuring 8 x 7 x 6 mm  Composition: Solid or almost completely solid, 2 points  Echogenicity: Hypoechoic, 2 points  Shape: Not taller than wide, 0 points  Margin: Smooth, 0 points  Echogenic Foci: None, or large comet-tail artifacts, 0 points  Point Total: 4-6 points. TI-RADS 4. If 1.5 cm or larger, recommend  FNA; if 1 cm or larger, follow up US (annually for 5 years).    Nodule 3: Left mid pole thyroid nodule measuring 9 x 7 x 7 mm  Composition: Unable to determine, 2 points  Echogenicity: Hypoechoic, 2 points  Shape: Not taller than wide, 0 points  Margin: Smooth, 0 points  Echogenic Foci: None, or large comet-tail artifacts, 0 points  Point Total: 4-6 points. TI-RADS 4. If 1.5 cm or larger,  recommend  FNA; if 1 cm or larger, follow up US (annually for 5 years).    Impression  IMPRESSION:  1.  Bilateral thyroid nodules. Dominant right thyroid nodule measuring  up to 3.1 cm meets criteria for fine needle aspiration.  2.  No sonographic evidence of a parathyroid lesion.    Nodules are characterized per  ACR Thyroid Imaging, Reporting and Data System (TI-RADS): White Paper  of the ACR TI-RADS Committee  Cheikh Diego., et al. Journal of the American College of  Radiology 2017. Volume 14 (2017), Issue 5, 508-090.    ROXANA ARBOLEDA MD      SYSTEM ID:  [id number]        Results for orders placed during the hospital encounter of 10/10/23    DX Hip/Pelvis/Spine    Narrative  EXAM: DX WRIST/HEEL/RADIUS, DX HIP/PELVIS/SPINE  LOCATION: [facility]  DATE: 10/10/2023    INDICATION: Postmenopausal female with a history of breast cancer. History of hyperparathyroidism.  DEMOGRAPHICS: Age- 57 years. Gender- Female. Menopausal status- Postmenopausal.  COMPARISON: 11/21/2017.  TECHNIQUE: Dual-energy x-ray absorptiometry (DXA) performed with routine technique. Forearm DXA performed due to hyperparathyroidism.    FINDINGS:    DXA RESULTS  -Lumbar Spine: L1-L4: BMD: 1.305 g/cm2. T-score: 0.9. Z-score: 0.9. I suspect degenerative changes throughout the lumbar spine.  -RIGHT Hip Total: BMD: 0.975 g/cm2. T-score: -0.3. Z-score: -0.2.  -RIGHT Hip Femoral neck: BMD: 0.935 g/cm2. T-score: -0.7. Z-score: -0.3.  -LEFT Hip Total: BMD: 0.983 g/cm2. T-score: -0.2. Z-score: -0.1.  -LEFT Hip Femoral neck: BMD: 0.845 g/cm2. T-score: -1.4. Z-score: -0.9.  -LEFT Radius 33%: BMD: 0.620 g/cm2. T-score: -1.3. Z-score: -0.4.    WHO T-SCORE CRITERIA  -Normal: T score at or above -1 SD  -Osteopenia: T score between -1 and -2.5 SD  -Osteoporosis: T score at or below -2.5 SD    The World Health Organization (WHO) criteria is applicable to perimenopausal females, postmenopausal females, and men aged 50 years or  older.    INTERVAL CHANGE  -There has been a 1.8% decrease in L3-L4 lumbar spine BMD. Sclerotic changes likely seen throughout the lumbar spine.  -There has been a 6.4% decrease in bilateral hip BMD.    FRACTURE RISK  -FRAX Results: The 10 year probability of major osteoporotic fracture is 7.0%, and of hip fracture is 0.5%, based on left femoral neck BMD.    RECOMMENDATIONS  Consider treatment if major osteoporotic fracture score is greater than or equal to 20%, or if the hip fracture score is greater than or equal to 3%.    Impression  IMPRESSION: Low bone density (OSTEOPENIA). T score meets the WHO criteria for low bone density (osteopenia) at one or more measured sites. The risk of osteoporotic fracture increases approximately two-fold for each standard deviation decrease in T-score.      Again, thank you for allowing me to participate in the care of your patient.        Sincerely,        Farida Sanchez MD

## 2024-05-21 ENCOUNTER — MYC MEDICAL ADVICE (OUTPATIENT)
Dept: OTOLARYNGOLOGY | Facility: CLINIC | Age: 59
End: 2024-05-21
Payer: COMMERCIAL

## 2024-05-21 ENCOUNTER — TELEPHONE (OUTPATIENT)
Dept: SURGERY | Facility: CLINIC | Age: 59
End: 2024-05-21
Payer: COMMERCIAL

## 2024-05-21 NOTE — TELEPHONE ENCOUNTER
Patient is currently scheduled for a video visit on 07/11/24 with Dr. Gustafson for Hyperparathyroidism. Called patient and left detailed message offering opening with provider on 05/23/24 at 8:50 AM. This can be in-person or video visit. Advised patient in voicemail that this appointment time is on a first come first served basis and is not guaranteed. Patient to return call to clinic at [phone] to claim.     05/23/24 8:50 AM time slot with Dr. Gustafson currently on hold.  Aruna Espinoza, BRYAN

## 2024-05-22 NOTE — TELEPHONE ENCOUNTER
5/22 Spot on 5/23/24 has been taken and scheduled. Closing encounter.     Mallory dimas Complex   Dermatology, Surgery, Urology  [facility]

## 2024-05-22 NOTE — TELEPHONE ENCOUNTER
05/23/24 8:50 AM appointment slot with Dr. Gustafson has been filled. Closing encounter.   Aruna Espinoza, CMA

## 2024-07-11 ENCOUNTER — VIRTUAL VISIT (OUTPATIENT)
Dept: SURGERY | Facility: CLINIC | Age: 59
End: 2024-07-11
Attending: SURGERY
Payer: COMMERCIAL

## 2024-07-11 DIAGNOSIS — E21.3 HYPERPARATHYROIDISM (H): ICD-10-CM

## 2024-07-11 PROCEDURE — 99203 OFFICE O/P NEW LOW 30 MIN: CPT | Mod: 95 | Performed by: SURGERY

## 2024-07-11 NOTE — PROGRESS NOTES
Virtual Visit Details    Type of service:  Video Visit     Originating Location (pt. Location): Home    Distant Location (provider location):  On-site  Platform used for Video Visit: Leonora Espinoza CMA

## 2024-07-11 NOTE — LETTER
7/11/2024      Ann Vargas  [address]  [facility] [address]      Dear Colleague,    Thank you for referring your patient, Ann Vargas, to the [facility]. Please see a copy of my visit note below.    Virtual Visit Details    Type of service:  Video Visit     Originating Location (pt. Location): Home    Distant Location (provider location):  On-site  Platform used for Video Visit: Leonora Espinoza CMA        Due to the COVID 19 pandemic this visit was a video visit in order to help prevent spread of infection in this high risk patient and the general population. The patient gave verbal consent for the visit today.    Start time 11:30am  Stop time 11;45am  Total time 15 minutes face to face conversation video   Chart prep , review of images, results , start note prior to appt 20 minutes   Total 35 minutes     SURGERY CLINIC CONSULTATION    REASON FOR CONSULTATION:  Ann Vargas was referred by Dr. Sanchez for evaluation and discussion of treatment options for hyperparathyroidism     HISTORY OF PRESENT ILLNESS:  Ann Vargas is a 58 year old female who has about a 2-year history of hypocalcemia.  Her most recent labs from October 2023 include: Calcium 10.2, parathyroid hormone level 83, 24-hour urine calcium 360 mg.  A localizing CT parathyroid scan identified an area of concern in the right inferior position.  Of note the patient does have a 3.1 cm right thyroid nodule that has been biopsied and is benign.  Her thyroid function tests are within normal limits.    Symptoms associated with the patient's hyperparathyroidism include osteopenia, muscle aches, fatigue.  She denies any history of recent fractures or nephrolithiasis.  There is no family or previous history of hyperparathyroidism      REVIEW OF SYSTEMS:  ROS EXAM: 10 point view of systems is pertinent for that noted in the HPI  Patient Active Problem List   Diagnosis     Chronic rhinitis     Gastroesophageal  reflux disease without esophagitis     Hyperlipidemia LDL goal <130     Iritis, recurrent     Breast cancer (H)     Malignant neoplasm of left female breast (HCC)     History of left breast cancer     Morbid obesity (H)     Benign essential hypertension     Hyperparathyroidism (H24)       Past Surgical History:   Procedure Laterality Date     BIOPSY BREAST       C/SECTION, CLASSICAL  Aug 93 &     , Classical x 2     EYE SURGERY  ?    detachted retina surgery     LUMPECTOMY BREAST       LUMPECTOMY BREAST WITH SEED LOCALIZATION Left 2014    Procedure: LUMPECTOMY BREAST WITH SEED LOCALIZATION;  Surgeon: Shonna Leung MD;  Location: [facility]     LUMPECTOMY BREAST WITH SENTINEL NODE, COMBINED Left 2014    Procedure: COMBINED LUMPECTOMY BREAST WITH SENTINEL NODE;  Surgeon: Shonna Leung MD;  Location: [facility]     PHACOEMULSIFICATION WITH STANDARD INTRAOCULAR LENS IMPLANT Right 2020    Procedure: Cataract Removal with Implant;  Surgeon: Ricki Luo MD;  Location: [facility]     PHACOEMULSIFICATION WITH STANDARD INTRAOCULAR LENS IMPLANT Left 2020    Procedure: Cataract Removal with Implant;  Surgeon: Ricki Luo MD;  Location: [facility]     SURGICAL HISTORY OF -       detached retina       Allergies   Allergen Reactions     Yellow Dye Anaphylaxis     Yellow dye #5/#6        Medications reviewed in the EMR        Family History   Problem Relation Age of Onset     Hypertension Mother      Lipids Mother      Depression Mother         bipolar, also hysterectomy for fibroid     Genitourinary Problems Mother         Kidney  - on transplant list     Allergies Mother      Obesity Mother      Cancer Mother         skin cancer     Hyperlipidemia Mother      Mental Illness Mother         Bipolar     Lipids Brother      Allergies Father      Obesity Father      Cancer Father         skin cancer     Cancer Maternal Grandmother         ovarian     Cancer  Maternal Grandfather         lung     I personally reviewed the radiographic images and laboratory data     ASSESSMENT:   1. Hyperparathyroidism (H24)        PLAN:   Based on the above I recommend the patient undergo a neck exploration and resection of parathyroid adenoma or adenomas.  Her right thyroid nodule is benign however there are times where it is difficult to get around the thyroid nodule or if the parathyroid is adherent to the thyroid nodule that we would have to perform a thyroid lobectomy.  I feel it is unlikely in this situation.  She says the nodules asymptomatic and again unlikely would require a thyroid lobectomy.  We discussed the parathyroidectomy as well as the risks including but not limited to bleeding infection injury to the recurrent laryngeal nerve or nerves potential permanent hypocalcemia or loss of airway.  Patient is aware of this and would like to schedule surgery    Shonna Gustafson MD              Again, thank you for allowing me to participate in the care of your patient.        Sincerely,        Shonna Gustafson MD

## 2024-07-29 ENCOUNTER — PREP FOR PROCEDURE (OUTPATIENT)
Dept: OTOLARYNGOLOGY | Facility: CLINIC | Age: 59
End: 2024-07-29
Payer: COMMERCIAL

## 2024-07-29 DIAGNOSIS — E21.3 HYPERPARATHYROIDISM (H): Primary | ICD-10-CM

## 2024-07-29 RX ORDER — DEXAMETHASONE SODIUM PHOSPHATE 4 MG/ML
10 INJECTION, SOLUTION INTRA-ARTICULAR; INTRALESIONAL; INTRAMUSCULAR; INTRAVENOUS; SOFT TISSUE ONCE
OUTPATIENT
Start: 2024-07-29 | End: 2024-07-29

## 2024-08-02 ENCOUNTER — PREP FOR PROCEDURE (OUTPATIENT)
Dept: OTOLARYNGOLOGY | Facility: CLINIC | Age: 59
End: 2024-08-02
Payer: COMMERCIAL

## 2024-08-02 DIAGNOSIS — E21.3 HYPERPARATHYROIDISM (H): Primary | ICD-10-CM

## 2024-08-02 RX ORDER — DEXAMETHASONE SODIUM PHOSPHATE 4 MG/ML
10 INJECTION, SOLUTION INTRA-ARTICULAR; INTRALESIONAL; INTRAMUSCULAR; INTRAVENOUS; SOFT TISSUE ONCE
OUTPATIENT
Start: 2024-08-02 | End: 2024-08-02

## 2024-08-05 ENCOUNTER — TELEPHONE (OUTPATIENT)
Dept: OTOLARYNGOLOGY | Facility: CLINIC | Age: 59
End: 2024-08-05
Payer: COMMERCIAL

## 2024-08-05 PROBLEM — E21.3 HYPERPARATHYROIDISM (H): Status: ACTIVE | Noted: 2024-08-02

## 2024-08-05 NOTE — TELEPHONE ENCOUNTER
Scheduled surgery with Dr. Gustafson on 10/15/2024    Spoke with: Patient    Surgery is located at [facility]    Patient will be seen for their H&P by their PCP   within 30 days of surgery - Confirmed PCP on file is up to date     Does patient need a consult before upcoming surgery? No    Anesthesia type: General    Requested Imaging required for surgery: No    Patient needs scheduled for their 2 week post op    Patient will receive their surgery packet via mychart and mail per their preference    Patient was not provided a start time for surgery & is aware they will receive this information 2-3 days before surgery     Patient was instructed to call back with any further questions or concerns.     Brian Gilmore on 8/5/2024 at 4:42 PM

## 2024-08-07 NOTE — PROGRESS NOTES
Due to the COVID 19 pandemic this visit was a video visit in order to help prevent spread of infection in this high risk patient and the general population. The patient gave verbal consent for the visit today.    Start time 11:30am  Stop time 11;45am  Total time 15 minutes face to face conversation video   Chart prep , review of images, results , start note prior to appt 20 minutes   Total 35 minutes     SURGERY CLINIC CONSULTATION    REASON FOR CONSULTATION:  Ann Vargas was referred by Dr. Sanchez for evaluation and discussion of treatment options for hyperparathyroidism     HISTORY OF PRESENT ILLNESS:  Ann Vargas is a 58 year old female who has about a 2-year history of hypocalcemia.  Her most recent labs from 2023 include: Calcium 10.2, parathyroid hormone level 83, 24-hour urine calcium 360 mg.  A localizing CT parathyroid scan identified an area of concern in the right inferior position.  Of note the patient does have a 3.1 cm right thyroid nodule that has been biopsied and is benign.  Her thyroid function tests are within normal limits.    Symptoms associated with the patient's hyperparathyroidism include osteopenia, muscle aches, fatigue.  She denies any history of recent fractures or nephrolithiasis.  There is no family or previous history of hyperparathyroidism      REVIEW OF SYSTEMS:  ROS EXAM: 10 point view of systems is pertinent for that noted in the HPI  Patient Active Problem List   Diagnosis    Chronic rhinitis    Gastroesophageal reflux disease without esophagitis    Hyperlipidemia LDL goal <130    Iritis, recurrent    Breast cancer (H)    Malignant neoplasm of left female breast (HCC)    History of left breast cancer    Morbid obesity (H)    Benign essential hypertension    Hyperparathyroidism (H24)       Past Surgical History:   Procedure Laterality Date    BIOPSY BREAST      C/SECTION, CLASSICAL  Aug 93 &     , Classical x 2    EYE SURGERY  ?    detachted retina  surgery    LUMPECTOMY BREAST      LUMPECTOMY BREAST WITH SEED LOCALIZATION Left 11/19/2014    Procedure: LUMPECTOMY BREAST WITH SEED LOCALIZATION;  Surgeon: Shonna Leung MD;  Location: [facility]    LUMPECTOMY BREAST WITH SENTINEL NODE, COMBINED Left 11/19/2014    Procedure: COMBINED LUMPECTOMY BREAST WITH SENTINEL NODE;  Surgeon: Shonna Leung MD;  Location: [facility]    PHACOEMULSIFICATION WITH STANDARD INTRAOCULAR LENS IMPLANT Right 8/17/2020    Procedure: Cataract Removal with Implant;  Surgeon: Ricki Luo MD;  Location: [facility]    PHACOEMULSIFICATION WITH STANDARD INTRAOCULAR LENS IMPLANT Left 9/9/2020    Procedure: Cataract Removal with Implant;  Surgeon: Ricki Luo MD;  Location: [facility]    SURGICAL HISTORY OF -   1991    detached retina       Allergies   Allergen Reactions    Yellow Dye Anaphylaxis     Yellow dye #5/#6        Medications reviewed in the EMR        Family History   Problem Relation Age of Onset    Hypertension Mother     Lipids Mother     Depression Mother         bipolar, also hysterectomy for fibroid    Genitourinary Problems Mother         Kidney  - on transplant list    Allergies Mother     Obesity Mother     Cancer Mother         skin cancer    Hyperlipidemia Mother     Mental Illness Mother         Bipolar    Lipids Brother     Allergies Father     Obesity Father     Cancer Father         skin cancer    Cancer Maternal Grandmother         ovarian    Cancer Maternal Grandfather         lung     I personally reviewed the radiographic images and laboratory data     ASSESSMENT:   1. Hyperparathyroidism (H24)        PLAN:   Based on the above I recommend the patient undergo a neck exploration and resection of parathyroid adenoma or adenomas.  Her right thyroid nodule is benign however there are times where it is difficult to get around the thyroid nodule or if the parathyroid is adherent to the thyroid nodule that we would have to perform a thyroid  lobectomy.  I feel it is unlikely in this situation.  She says the nodules asymptomatic and again unlikely would require a thyroid lobectomy.  We discussed the parathyroidectomy as well as the risks including but not limited to bleeding infection injury to the recurrent laryngeal nerve or nerves potential permanent hypocalcemia or loss of airway.  Patient is aware of this and would like to schedule surgery    Shonna Gustafson MD

## 2024-08-14 DIAGNOSIS — I10 BENIGN ESSENTIAL HYPERTENSION: ICD-10-CM

## 2024-08-16 RX ORDER — LISINOPRIL 20 MG/1
TABLET ORAL
Qty: 90 TABLET | Refills: 0 | Status: SHIPPED | OUTPATIENT
Start: 2024-08-16 | End: 2024-09-19

## 2024-08-20 DIAGNOSIS — J30.9 CHRONIC ALLERGIC RHINITIS: ICD-10-CM

## 2024-08-20 DIAGNOSIS — E78.5 HYPERLIPIDEMIA LDL GOAL <130: ICD-10-CM

## 2024-08-20 DIAGNOSIS — K21.9 GASTROESOPHAGEAL REFLUX DISEASE WITHOUT ESOPHAGITIS: ICD-10-CM

## 2024-08-22 RX ORDER — SIMVASTATIN 40 MG
TABLET ORAL
Qty: 90 TABLET | Refills: 0 | Status: SHIPPED | OUTPATIENT
Start: 2024-08-22 | End: 2024-09-19

## 2024-08-22 RX ORDER — MONTELUKAST SODIUM 10 MG/1
1 TABLET ORAL AT BEDTIME
Qty: 90 TABLET | Refills: 0 | Status: SHIPPED | OUTPATIENT
Start: 2024-08-22 | End: 2024-09-19

## 2024-08-22 RX ORDER — OMEPRAZOLE 40 MG/1
CAPSULE, DELAYED RELEASE ORAL
Qty: 90 CAPSULE | Refills: 0 | Status: SHIPPED | OUTPATIENT
Start: 2024-08-22 | End: 2024-09-19

## 2024-09-19 ENCOUNTER — OFFICE VISIT (OUTPATIENT)
Dept: FAMILY MEDICINE | Facility: CLINIC | Age: 59
End: 2024-09-19
Attending: FAMILY MEDICINE
Payer: COMMERCIAL

## 2024-09-19 VITALS
OXYGEN SATURATION: 94 % | BODY MASS INDEX: 41.95 KG/M2 | WEIGHT: 261 LBS | HEART RATE: 83 BPM | TEMPERATURE: 97.2 F | DIASTOLIC BLOOD PRESSURE: 80 MMHG | SYSTOLIC BLOOD PRESSURE: 130 MMHG | HEIGHT: 66 IN

## 2024-09-19 DIAGNOSIS — Z01.818 PREOP GENERAL PHYSICAL EXAM: Primary | ICD-10-CM

## 2024-09-19 DIAGNOSIS — E21.3 HYPERPARATHYROIDISM (H): ICD-10-CM

## 2024-09-19 DIAGNOSIS — K21.9 GASTROESOPHAGEAL REFLUX DISEASE WITHOUT ESOPHAGITIS: ICD-10-CM

## 2024-09-19 DIAGNOSIS — E78.5 HYPERLIPIDEMIA LDL GOAL <130: ICD-10-CM

## 2024-09-19 DIAGNOSIS — E66.01 MORBID OBESITY (H): ICD-10-CM

## 2024-09-19 DIAGNOSIS — I10 BENIGN ESSENTIAL HYPERTENSION: ICD-10-CM

## 2024-09-19 DIAGNOSIS — J30.9 CHRONIC ALLERGIC RHINITIS: ICD-10-CM

## 2024-09-19 DIAGNOSIS — Z12.31 VISIT FOR SCREENING MAMMOGRAM: ICD-10-CM

## 2024-09-19 LAB
ANION GAP SERPL CALCULATED.3IONS-SCNC: 9 MMOL/L (ref 7–15)
BUN SERPL-MCNC: 10.9 MG/DL (ref 8–23)
CA-I BLD-MCNC: 5.4 MG/DL (ref 4.4–5.2)
CALCIUM SERPL-MCNC: 10.4 MG/DL (ref 8.8–10.4)
CHLORIDE SERPL-SCNC: 105 MMOL/L (ref 98–107)
CHOLEST SERPL-MCNC: 228 MG/DL
CREAT SERPL-MCNC: 0.76 MG/DL (ref 0.51–0.95)
EGFRCR SERPLBLD CKD-EPI 2021: 89 ML/MIN/1.73M2
FASTING STATUS PATIENT QL REPORTED: YES
FASTING STATUS PATIENT QL REPORTED: YES
GLUCOSE SERPL-MCNC: 103 MG/DL (ref 70–99)
HCO3 SERPL-SCNC: 26 MMOL/L (ref 22–29)
HDLC SERPL-MCNC: 57 MG/DL
LDLC SERPL CALC-MCNC: 122 MG/DL
NONHDLC SERPL-MCNC: 171 MG/DL
POTASSIUM SERPL-SCNC: 4.7 MMOL/L (ref 3.4–5.3)
SODIUM SERPL-SCNC: 140 MMOL/L (ref 135–145)
TRIGL SERPL-MCNC: 243 MG/DL

## 2024-09-19 PROCEDURE — 90673 RIV3 VACCINE NO PRESERV IM: CPT | Performed by: FAMILY MEDICINE

## 2024-09-19 PROCEDURE — 99214 OFFICE O/P EST MOD 30 MIN: CPT | Mod: 25 | Performed by: FAMILY MEDICINE

## 2024-09-19 PROCEDURE — 90471 IMMUNIZATION ADMIN: CPT | Performed by: FAMILY MEDICINE

## 2024-09-19 PROCEDURE — 36415 COLL VENOUS BLD VENIPUNCTURE: CPT | Performed by: FAMILY MEDICINE

## 2024-09-19 PROCEDURE — 80048 BASIC METABOLIC PNL TOTAL CA: CPT | Performed by: FAMILY MEDICINE

## 2024-09-19 PROCEDURE — 82330 ASSAY OF CALCIUM: CPT | Performed by: FAMILY MEDICINE

## 2024-09-19 PROCEDURE — 80061 LIPID PANEL: CPT | Performed by: FAMILY MEDICINE

## 2024-09-19 RX ORDER — LISINOPRIL 20 MG/1
20 TABLET ORAL DAILY
Qty: 90 TABLET | Refills: 3 | Status: SHIPPED | OUTPATIENT
Start: 2024-09-19

## 2024-09-19 RX ORDER — MONTELUKAST SODIUM 10 MG/1
1 TABLET ORAL AT BEDTIME
Qty: 90 TABLET | Refills: 3 | Status: SHIPPED | OUTPATIENT
Start: 2024-09-19

## 2024-09-19 RX ORDER — SIMVASTATIN 40 MG
TABLET ORAL
Qty: 90 TABLET | Refills: 3 | Status: SHIPPED | OUTPATIENT
Start: 2024-09-19

## 2024-09-19 RX ORDER — CHOLECALCIFEROL (VITAMIN D3) 50 MCG
1 TABLET ORAL DAILY
COMMUNITY

## 2024-09-19 RX ORDER — OMEPRAZOLE 40 MG/1
CAPSULE, DELAYED RELEASE ORAL
Qty: 90 CAPSULE | Refills: 3 | Status: SHIPPED | OUTPATIENT
Start: 2024-09-19

## 2024-09-19 ASSESSMENT — PAIN SCALES - GENERAL: PAINLEVEL: NO PAIN (0)

## 2024-09-19 NOTE — PROGRESS NOTES
Preoperative Evaluation  [facility]  [address]  [facility] [address]  Phone: [phone]  Primary Provider: Cydney Garcia MD  Pre-op Performing Provider: Cydney Garcia MD  Sep 19, 2024             9/18/2024   Surgical Information   What procedure is being done? Parathyroidectomy   Facility or Hospital where procedure/surgery will be performed: On file with [facility]   Who is doing the procedure / surgery? Dr. Gustafson   Date of surgery / procedure: 10/15/24   Time of surgery / procedure: 12;05PM   Where do you plan to recover after surgery? at home alone        Fax number for surgical facility: Note does not need to be faxed, will be available electronically in Epic.    Assessment & Plan     The proposed surgical procedure is considered LOW risk.    Preop general physical exam       Hyperparathyroidism (H24)   Scheduled for parathyroidectomy  - Ionized Calcium; Future    Benign essential hypertension  Well controlled  - BASIC METABOLIC PANEL; Future  - lisinopril (ZESTRIL) 20 MG tablet; Take 1 tablet (20 mg) by mouth daily.    Hyperlipidemia LDL goal <130  Stable, continue statin  - Lipid panel reflex to direct LDL Non-fasting; Future  - simvastatin (ZOCOR) 40 MG tablet; TAKE 1 TABLET AT BEDTIME    Morbid obesity (H)  Contributes to overall risk with hypertension, hld    Visit for screening mammogram   Due for mammogram- discussed  - MA Screening Bilateral w/ Gabe; Future    Chronic allergic rhinitis     - montelukast (SINGULAIR) 10 MG tablet; Take 1 tablet (10 mg) by mouth at bedtime.    Gastroesophageal reflux disease without esophagitis     - omeprazole (PRILOSEC) 40 MG DR capsule; TAKE 1 CAPSULE DAILY            - No identified additional risk factors other than previously addressed    Antiplatelet or Anticoagulation Medication Instructions   - Patient is on no antiplatelet or anticoagulation medications.    Additional Medication Instructions  Take all scheduled  medications on the day of surgery    Recommendation  Approval given to proceed with proposed procedure, without further diagnostic evaluation.    Jamil Juarez is a 58 year old, presenting for the following:  Pre-Op Exam          9/19/2024     7:46 AM   Additional Questions   Roomed by Jenni overton CMA   Accompanied by Self     HPI related to upcoming procedure: 57 yo female with hypertension, hyperlipidemia, obesity and persistent hypercalcemia secondary to hyperparathyroidism.  Scheduled for parathyroidectomy.        9/18/2024   Pre-Op Questionnaire   Have you ever had a heart attack or stroke? No   Have you ever had surgery on your heart or blood vessels, such as a stent placement, a coronary artery bypass, or surgery on an artery in your head, neck, heart, or legs? No   Do you have chest pain with activity? No   Do you have a history of heart failure? No   Do you currently have a cold, bronchitis or symptoms of other infection? No   Do you have a cough, shortness of breath, or wheezing? No   Do you or anyone in your family have previous history of blood clots? No   Do you or does anyone in your family have a serious bleeding problem such as prolonged bleeding following surgeries or cuts? No   Have you ever had problems with anemia or been told to take iron pills? No   Have you had any abnormal blood loss such as black, tarry or bloody stools, or abnormal vaginal bleeding? No   Have you ever had a blood transfusion? No   Are you willing to have a blood transfusion if it is medically needed before, during, or after your surgery? Yes   Have you or any of your relatives ever had problems with anesthesia? No   Do you have sleep apnea, excessive snoring or daytime drowsiness? No   Do you have any artifical heart valves or other implanted medical devices like a pacemaker, defibrillator, or continuous glucose monitor? No   Do you have artificial joints? No   Are you allergic to latex? No        Health Care  Directive  Patient does not have a Health Care Directive or Living Will: Discussed advance care planning with patient; however, patient declined at this time.    Preoperative Review of    reviewed - no record of controlled substances prescribed.      Status of Chronic Conditions:  See problem list for active medical problems.  Problems all longstanding and stable, except as noted/documented.  See ROS for pertinent symptoms related to these conditions.    Patient Active Problem List    Diagnosis Date Noted    Hyperparathyroidism (H24) 08/02/2024     Priority: Medium    Benign essential hypertension 11/09/2020     Priority: Medium    Morbid obesity (H) 12/17/2015     Priority: Medium    History of left breast cancer 09/29/2015     Priority: Medium    Malignant neoplasm of left female breast (HCC) 11/25/2014     Priority: Medium     left infiltrating ductal cancer, grade 1, ER/% positive, HER 2 negative, negative lymphovascular invasion, no DCIS, diagnosed through screening mammogram due to new microcalcification in a 48-year-old otherwise quite healthy postmenopausal woman. S/p lumpectomy 11/2014 found 5 mm GI, IDC, 2LN negative, with clear margin. She has pT1bN0 disease.   She finished RT 1/2015.   Due to her premenopausal state, tamoxifen is the only antihormone option for her in adjuvant setting to prevent the recurrence. We talked about the side effects, how she should be monitored during this intervention. The 5 yrs versus 10 yrs data with it. She made informed decision to proceed. This was started in 2/2015.       Breast cancer (H) 10/29/2014     Priority: Medium    Iritis, recurrent 04/20/2011     Priority: Medium    Hyperlipidemia LDL goal <130 10/31/2010     Priority: Medium     Initially on Vytorin      Gastroesophageal reflux disease without esophagitis 12/17/2009     Priority: Medium    Chronic rhinitis 10/23/2006     Priority: Medium     10/23/2006  Had been on shots--now on singulair  Working  well        Past Medical History:   Diagnosis Date    Benign essential hypertension 2020    Breast cancer (H)     Chronic rhinitis 10/23/2006    10/23/2006 Had been on shots--now on singulair  Working well    Complication of anesthesia     Diagnostic skin and sensitization tests (aka ALLERGENS)     3/11/15 IgE tests NEGATIVE for orange, tomato, strawberry, apple--cherry, g. pepper and mushrrom are pending.    GERD (gastroesophageal reflux disease)     Hyperlipidemia     PONV (postoperative nausea and vomiting)     Unexplained endometrial cells on cervical cytology 12/17/15    2/15/16 emb scheduled.      Past Surgical History:   Procedure Laterality Date    BIOPSY BREAST      C/SECTION, CLASSICAL  Aug 93 &     , Classical x 2    EYE SURGERY  ?    detachted retina surgery    LUMPECTOMY BREAST      LUMPECTOMY BREAST WITH SEED LOCALIZATION Left 2014    Procedure: LUMPECTOMY BREAST WITH SEED LOCALIZATION;  Surgeon: Shonna Leung MD;  Location: [facility]    LUMPECTOMY BREAST WITH SENTINEL NODE, COMBINED Left 2014    Procedure: COMBINED LUMPECTOMY BREAST WITH SENTINEL NODE;  Surgeon: Shonna Leung MD;  Location: [facility]    PHACOEMULSIFICATION WITH STANDARD INTRAOCULAR LENS IMPLANT Right 2020    Procedure: Cataract Removal with Implant;  Surgeon: Ricki Luo MD;  Location: [facility]    PHACOEMULSIFICATION WITH STANDARD INTRAOCULAR LENS IMPLANT Left 2020    Procedure: Cataract Removal with Implant;  Surgeon: Ricki Luo MD;  Location: [facility]    SURGICAL HISTORY OF -       detached retina     Current Outpatient Medications   Medication Sig Dispense Refill    lisinopril (ZESTRIL) 20 MG tablet TAKE 1 TABLET DAILY (NOTE DOSE INCREASE TO 20 MG, DISCONTINUE THE ORDER FOR 10 MG LISINOPRIL) 90 tablet 0    montelukast (SINGULAIR) 10 MG tablet TAKE 1 TABLET AT BEDTIME 90 tablet 0    omeprazole (PRILOSEC) 40 MG DR capsule TAKE 1 CAPSULE DAILY 90  "capsule 0    simvastatin (ZOCOR) 40 MG tablet TAKE 1 TABLET AT BEDTIME 90 tablet 0    vitamin D3 (CHOLECALCIFEROL) 50 mcg (2000 units) tablet Take 1 tablet by mouth daily.      EPINEPHrine (ADRENACLICK) 0.3 MG/0.3ML injection 2-pack Inject 0.3 mLs (0.3 mg) into the muscle as needed for anaphylaxis 0.6 mL 2    timolol (TIMOPTIC) 0.5 % ophthalmic solution 2 times daily as needed         Allergies   Allergen Reactions    Yellow Dye Anaphylaxis     Yellow dye #5/#6         Social History     Tobacco Use    Smoking status: Never    Smokeless tobacco: Never   Substance Use Topics    Alcohol use: Yes     Alcohol/week: 0.0 standard drinks of alcohol     Comment: -monthly     Family History   Problem Relation Age of Onset    Hypertension Mother     Lipids Mother     Depression Mother         bipolar, also hysterectomy for fibroid    Genitourinary Problems Mother         Kidney  - on transplant list    Allergies Mother     Obesity Mother     Cancer Mother         skin cancer    Hyperlipidemia Mother     Mental Illness Mother         Bipolar    Lipids Brother     Allergies Father     Obesity Father     Cancer Father         skin cancer    Cancer Maternal Grandmother         ovarian    Cancer Maternal Grandfather         lung     History   Drug Use No             Review of Systems  Constitutional, HEENT, cardiovascular, pulmonary, gi and gu systems are negative, except as otherwise noted.    Objective    /80   Pulse 83   Temp 97.2  F (36.2  C) (Tympanic)   Ht 1.676 m (5' 6\")   Wt 118.4 kg (261 lb)   LMP 02/17/2015   SpO2 94%   BMI 42.13 kg/m     Estimated body mass index is 42.13 kg/m  as calculated from the following:    Height as of this encounter: 1.676 m (5' 6\").    Weight as of this encounter: 118.4 kg (261 lb).  Physical Exam  GENERAL: alert and no distress  NECK: no adenopathy, no asymmetry, masses, or scars  RESP: lungs clear to auscultation - no rales, rhonchi or wheezes  CV: regular rate and rhythm, " normal S1 S2, no S3 or S4, no murmur, click or rub, no peripheral edema  MS: no gross musculoskeletal defects noted, no edema  NEURO: Normal strength and tone, mentation intact and speech normal  PSYCH: mentation appears normal, affect normal/bright    Recent Labs   Lab Test 09/22/23  0917      POTASSIUM 4.5   CR 0.72        Diagnostics  Labs pending at this time.  Results will be reviewed when available.   No EKG required, no history of coronary heart disease, significant arrhythmia, peripheral arterial disease or other structural heart disease.    Revised Cardiac Risk Index (RCRI)  The patient has the following serious cardiovascular risks for perioperative complications:   - No serious cardiac risks = 0 points     RCRI Interpretation: 0 points: Class I (very low risk - 0.4% complication rate)         Signed Electronically by: Cydney Garcia MD  A copy of this evaluation report is provided to the requesting physician.

## 2024-09-19 NOTE — PATIENT INSTRUCTIONS

## 2024-09-27 ENCOUNTER — PATIENT OUTREACH (OUTPATIENT)
Dept: ONCOLOGY | Facility: CLINIC | Age: 59
End: 2024-09-27
Payer: COMMERCIAL

## 2024-09-27 NOTE — PROGRESS NOTES
[facility]: Cancer Care                                                                                          Pt has not seen onc since 2019. Removed RNODALYS Larry from pt care team.     Signature:  INNA MARADIAGA RN

## 2024-10-14 ENCOUNTER — ANESTHESIA EVENT (OUTPATIENT)
Dept: SURGERY | Facility: AMBULATORY SURGERY CENTER | Age: 59
End: 2024-10-14
Payer: COMMERCIAL

## 2024-10-14 RX ORDER — NALOXONE HYDROCHLORIDE 0.4 MG/ML
0.1 INJECTION, SOLUTION INTRAMUSCULAR; INTRAVENOUS; SUBCUTANEOUS
Status: CANCELLED | OUTPATIENT
Start: 2024-10-14

## 2024-10-14 RX ORDER — DEXAMETHASONE SODIUM PHOSPHATE 10 MG/ML
4 INJECTION, SOLUTION INTRAMUSCULAR; INTRAVENOUS
Status: CANCELLED | OUTPATIENT
Start: 2024-10-14

## 2024-10-15 ENCOUNTER — HOSPITAL ENCOUNTER (OUTPATIENT)
Facility: AMBULATORY SURGERY CENTER | Age: 59
Discharge: HOME | End: 2024-10-15
Attending: SURGERY
Payer: COMMERCIAL

## 2024-10-15 ENCOUNTER — ANESTHESIA (OUTPATIENT)
Dept: SURGERY | Facility: AMBULATORY SURGERY CENTER | Age: 59
End: 2024-10-15
Payer: COMMERCIAL

## 2024-10-15 VITALS
RESPIRATION RATE: 16 BRPM | DIASTOLIC BLOOD PRESSURE: 89 MMHG | HEART RATE: 96 BPM | SYSTOLIC BLOOD PRESSURE: 144 MMHG | HEIGHT: 66 IN | BODY MASS INDEX: 43.39 KG/M2 | TEMPERATURE: 98.2 F | OXYGEN SATURATION: 94 % | WEIGHT: 270 LBS

## 2024-10-15 DIAGNOSIS — E21.3 HYPERPARATHYROIDISM (H): ICD-10-CM

## 2024-10-15 DIAGNOSIS — Z98.890 STATUS POST SURGERY: Primary | ICD-10-CM

## 2024-10-15 LAB
PTH-INTACT SERPL-MCNC: 11 PG/ML (ref 15–65)
PTH-INTACT SERPL-MCNC: 177 PG/ML (ref 15–65)
PTH-INTACT SERPL-MCNC: 9 PG/ML (ref 15–65)

## 2024-10-15 PROCEDURE — 88305 TISSUE EXAM BY PATHOLOGIST: CPT | Mod: 26 | Performed by: STUDENT IN AN ORGANIZED HEALTH CARE EDUCATION/TRAINING PROGRAM

## 2024-10-15 PROCEDURE — 60500 EXPLORE PARATHYROID GLANDS: CPT | Performed by: ANESTHESIOLOGY

## 2024-10-15 PROCEDURE — 60500 EXPLORE PARATHYROID GLANDS: CPT | Performed by: NURSE ANESTHETIST, CERTIFIED REGISTERED

## 2024-10-15 PROCEDURE — 88331 PATH CONSLTJ SURG 1 BLK 1SPC: CPT | Mod: TC | Performed by: SURGERY

## 2024-10-15 PROCEDURE — 60220 PARTIAL REMOVAL OF THYROID: CPT | Mod: 59,RT

## 2024-10-15 PROCEDURE — 88331 PATH CONSLTJ SURG 1 BLK 1SPC: CPT | Mod: 26 | Performed by: STUDENT IN AN ORGANIZED HEALTH CARE EDUCATION/TRAINING PROGRAM

## 2024-10-15 PROCEDURE — 60500 EXPLORE PARATHYROID GLANDS: CPT

## 2024-10-15 PROCEDURE — 88307 TISSUE EXAM BY PATHOLOGIST: CPT | Mod: 26 | Performed by: STUDENT IN AN ORGANIZED HEALTH CARE EDUCATION/TRAINING PROGRAM

## 2024-10-15 PROCEDURE — 60500 EXPLORE PARATHYROID GLANDS: CPT | Mod: GC | Performed by: SURGERY

## 2024-10-15 PROCEDURE — 83970 ASSAY OF PARATHORMONE: CPT | Performed by: PATHOLOGY

## 2024-10-15 PROCEDURE — 60220 PARTIAL REMOVAL OF THYROID: CPT | Mod: 59 | Performed by: SURGERY

## 2024-10-15 RX ORDER — SODIUM CHLORIDE, SODIUM LACTATE, POTASSIUM CHLORIDE, CALCIUM CHLORIDE 600; 310; 30; 20 MG/100ML; MG/100ML; MG/100ML; MG/100ML
INJECTION, SOLUTION INTRAVENOUS CONTINUOUS
Status: DISCONTINUED | OUTPATIENT
Start: 2024-10-15 | End: 2024-10-16 | Stop reason: HOSPADM

## 2024-10-15 RX ORDER — DEXAMETHASONE SODIUM PHOSPHATE 10 MG/ML
10 INJECTION, SOLUTION INTRAMUSCULAR; INTRAVENOUS ONCE
Status: COMPLETED | OUTPATIENT
Start: 2024-10-15 | End: 2024-10-15

## 2024-10-15 RX ORDER — PROPOFOL 10 MG/ML
INJECTION, EMULSION INTRAVENOUS PRN
Status: DISCONTINUED | OUTPATIENT
Start: 2024-10-15 | End: 2024-10-15

## 2024-10-15 RX ORDER — ONDANSETRON 4 MG/1
4 TABLET, ORALLY DISINTEGRATING ORAL EVERY 30 MIN PRN
Status: DISCONTINUED | OUTPATIENT
Start: 2024-10-15 | End: 2024-10-16 | Stop reason: HOSPADM

## 2024-10-15 RX ORDER — DEXAMETHASONE SODIUM PHOSPHATE 4 MG/ML
INJECTION, SOLUTION INTRA-ARTICULAR; INTRALESIONAL; INTRAMUSCULAR; INTRAVENOUS; SOFT TISSUE PRN
Status: DISCONTINUED | OUTPATIENT
Start: 2024-10-15 | End: 2024-10-15

## 2024-10-15 RX ORDER — DEXMEDETOMIDINE HYDROCHLORIDE 4 UG/ML
INJECTION, SOLUTION INTRAVENOUS PRN
Status: DISCONTINUED | OUTPATIENT
Start: 2024-10-15 | End: 2024-10-15

## 2024-10-15 RX ORDER — ONDANSETRON 2 MG/ML
INJECTION INTRAMUSCULAR; INTRAVENOUS PRN
Status: DISCONTINUED | OUTPATIENT
Start: 2024-10-15 | End: 2024-10-15

## 2024-10-15 RX ORDER — FENTANYL CITRATE 50 UG/ML
50 INJECTION, SOLUTION INTRAMUSCULAR; INTRAVENOUS EVERY 5 MIN PRN
Status: DISCONTINUED | OUTPATIENT
Start: 2024-10-15 | End: 2024-10-16 | Stop reason: HOSPADM

## 2024-10-15 RX ORDER — ONDANSETRON 2 MG/ML
4 INJECTION INTRAMUSCULAR; INTRAVENOUS EVERY 30 MIN PRN
Status: DISCONTINUED | OUTPATIENT
Start: 2024-10-15 | End: 2024-10-16 | Stop reason: HOSPADM

## 2024-10-15 RX ORDER — FENTANYL CITRATE 50 UG/ML
INJECTION, SOLUTION INTRAMUSCULAR; INTRAVENOUS PRN
Status: DISCONTINUED | OUTPATIENT
Start: 2024-10-15 | End: 2024-10-15

## 2024-10-15 RX ORDER — ACETAMINOPHEN 325 MG/1
975 TABLET ORAL ONCE
Status: COMPLETED | OUTPATIENT
Start: 2024-10-15 | End: 2024-10-15

## 2024-10-15 RX ORDER — EPHEDRINE SULFATE 50 MG/ML
INJECTION, SOLUTION INTRAMUSCULAR; INTRAVENOUS; SUBCUTANEOUS PRN
Status: DISCONTINUED | OUTPATIENT
Start: 2024-10-15 | End: 2024-10-15

## 2024-10-15 RX ORDER — LIDOCAINE HYDROCHLORIDE 20 MG/ML
INJECTION, SOLUTION INFILTRATION; PERINEURAL PRN
Status: DISCONTINUED | OUTPATIENT
Start: 2024-10-15 | End: 2024-10-15

## 2024-10-15 RX ORDER — DEXAMETHASONE SODIUM PHOSPHATE 10 MG/ML
4 INJECTION, SOLUTION INTRAMUSCULAR; INTRAVENOUS
Status: DISCONTINUED | OUTPATIENT
Start: 2024-10-15 | End: 2024-10-16 | Stop reason: HOSPADM

## 2024-10-15 RX ORDER — NALOXONE HYDROCHLORIDE 0.4 MG/ML
0.1 INJECTION, SOLUTION INTRAMUSCULAR; INTRAVENOUS; SUBCUTANEOUS
Status: DISCONTINUED | OUTPATIENT
Start: 2024-10-15 | End: 2024-10-16 | Stop reason: HOSPADM

## 2024-10-15 RX ORDER — ACETAMINOPHEN 325 MG/1
975 TABLET ORAL ONCE
Status: DISCONTINUED | OUTPATIENT
Start: 2024-10-15 | End: 2024-10-16 | Stop reason: HOSPADM

## 2024-10-15 RX ORDER — LIDOCAINE 40 MG/G
CREAM TOPICAL
Status: DISCONTINUED | OUTPATIENT
Start: 2024-10-15 | End: 2024-10-16 | Stop reason: HOSPADM

## 2024-10-15 RX ORDER — HYDROMORPHONE HYDROCHLORIDE 1 MG/ML
0.4 INJECTION, SOLUTION INTRAMUSCULAR; INTRAVENOUS; SUBCUTANEOUS EVERY 5 MIN PRN
Status: DISCONTINUED | OUTPATIENT
Start: 2024-10-15 | End: 2024-10-16 | Stop reason: HOSPADM

## 2024-10-15 RX ORDER — OXYCODONE AND ACETAMINOPHEN 5; 325 MG/1; MG/1
1 TABLET ORAL
Status: DISCONTINUED | OUTPATIENT
Start: 2024-10-15 | End: 2024-10-16 | Stop reason: HOSPADM

## 2024-10-15 RX ORDER — HYDROMORPHONE HYDROCHLORIDE 1 MG/ML
0.2 INJECTION, SOLUTION INTRAMUSCULAR; INTRAVENOUS; SUBCUTANEOUS EVERY 5 MIN PRN
Status: DISCONTINUED | OUTPATIENT
Start: 2024-10-15 | End: 2024-10-16 | Stop reason: HOSPADM

## 2024-10-15 RX ORDER — ACETAMINOPHEN 325 MG/1
650 TABLET ORAL EVERY 4 HOURS PRN
Qty: 50 TABLET | Refills: 0 | Status: SHIPPED | OUTPATIENT
Start: 2024-10-15

## 2024-10-15 RX ORDER — FENTANYL CITRATE 50 UG/ML
25 INJECTION, SOLUTION INTRAMUSCULAR; INTRAVENOUS EVERY 5 MIN PRN
Status: DISCONTINUED | OUTPATIENT
Start: 2024-10-15 | End: 2024-10-16 | Stop reason: HOSPADM

## 2024-10-15 RX ORDER — PROPOFOL 10 MG/ML
INJECTION, EMULSION INTRAVENOUS CONTINUOUS PRN
Status: DISCONTINUED | OUTPATIENT
Start: 2024-10-15 | End: 2024-10-15

## 2024-10-15 RX ADMIN — DEXMEDETOMIDINE HYDROCHLORIDE 10 MCG: 4 INJECTION, SOLUTION INTRAVENOUS at 11:34

## 2024-10-15 RX ADMIN — DEXMEDETOMIDINE HYDROCHLORIDE 10 MCG: 4 INJECTION, SOLUTION INTRAVENOUS at 11:15

## 2024-10-15 RX ADMIN — Medication 0.5 MG: at 12:26

## 2024-10-15 RX ADMIN — Medication 100 MCG: at 10:52

## 2024-10-15 RX ADMIN — Medication 0.3 MCG/KG/MIN: at 10:55

## 2024-10-15 RX ADMIN — SODIUM CHLORIDE, SODIUM LACTATE, POTASSIUM CHLORIDE, CALCIUM CHLORIDE: 600; 310; 30; 20 INJECTION, SOLUTION INTRAVENOUS at 09:32

## 2024-10-15 RX ADMIN — EPHEDRINE SULFATE 5 MG: 50 INJECTION, SOLUTION INTRAMUSCULAR; INTRAVENOUS; SUBCUTANEOUS at 11:23

## 2024-10-15 RX ADMIN — DEXAMETHASONE SODIUM PHOSPHATE 10 MG: 4 INJECTION, SOLUTION INTRA-ARTICULAR; INTRALESIONAL; INTRAMUSCULAR; INTRAVENOUS; SOFT TISSUE at 10:40

## 2024-10-15 RX ADMIN — LIDOCAINE HYDROCHLORIDE 100 MG: 20 INJECTION, SOLUTION INFILTRATION; PERINEURAL at 10:34

## 2024-10-15 RX ADMIN — EPHEDRINE SULFATE 10 MG: 50 INJECTION, SOLUTION INTRAMUSCULAR; INTRAVENOUS; SUBCUTANEOUS at 11:06

## 2024-10-15 RX ADMIN — Medication 200 MCG: at 11:04

## 2024-10-15 RX ADMIN — FENTANYL CITRATE 100 MCG: 50 INJECTION, SOLUTION INTRAMUSCULAR; INTRAVENOUS at 10:34

## 2024-10-15 RX ADMIN — PROPOFOL 200 MG: 10 INJECTION, EMULSION INTRAVENOUS at 10:34

## 2024-10-15 RX ADMIN — DEXAMETHASONE SODIUM PHOSPHATE 10 MG: 10 INJECTION, SOLUTION INTRAMUSCULAR; INTRAVENOUS at 10:36

## 2024-10-15 RX ADMIN — PROPOFOL 150 MCG/KG/MIN: 10 INJECTION, EMULSION INTRAVENOUS at 10:36

## 2024-10-15 RX ADMIN — EPHEDRINE SULFATE 5 MG: 50 INJECTION, SOLUTION INTRAMUSCULAR; INTRAVENOUS; SUBCUTANEOUS at 12:19

## 2024-10-15 RX ADMIN — ACETAMINOPHEN 975 MG: 325 TABLET ORAL at 09:27

## 2024-10-15 RX ADMIN — Medication 100 MCG: at 12:12

## 2024-10-15 RX ADMIN — ONDANSETRON 4 MG: 2 INJECTION INTRAMUSCULAR; INTRAVENOUS at 12:18

## 2024-10-15 RX ADMIN — EPHEDRINE SULFATE 5 MG: 50 INJECTION, SOLUTION INTRAMUSCULAR; INTRAVENOUS; SUBCUTANEOUS at 12:11

## 2024-10-15 NOTE — ANESTHESIA PROCEDURE NOTES
Airway       Patient location during procedure: OR       Procedure Start/Stop Times: 10/15/2024 10:38 AM  Staff -        CRNA: Sofie Caldwell APRN CRNA       Other Anesthesia Staff: Paul Barboza       Performed By: CRNA and SRNA  Consent for Airway        Urgency: elective  Indications and Patient Condition       Indications for airway management: shweta-procedural       Induction type:intravenous       Mask difficulty assessment: 1 - vent by mask    Final Airway Details       Final airway type: endotracheal airway       Successful airway: NIM  Endotracheal Airway Details        ETT size (mm): 6.0       Cuffed: yes       Successful intubation technique: video laryngoscopy       VL Blade Size: Glidescope 3       Grade View of Cords: 1       Adjucts: stylet       Position: Right       Measured from: gums/teeth       Secured at (cm): 21       Bite block used: None    Post intubation assessment        Placement verified by: capnometry, equal breath sounds and chest rise        Number of attempts at approach: 1       Number of other approaches attempted: 0       Secured with: tape       Ease of procedure: easy       Dentition: Intact    Medication(s) Administered   Medication Administration Time: 10/15/2024 10:38 AM

## 2024-10-15 NOTE — BRIEF OP NOTE
[facility]    Brief Operative Note    Pre-operative diagnosis: Hyperparathyroidism (H24) [E21.3]  Post-operative diagnosis Same as pre-operative diagnosis    Procedure: RIGHT THYROIDECTOMY AND ISTHMUSECTOMY for intrathyroidal parathyroid, N/A - Neck    Surgeon: Surgeons and Role:     * Shonna Gustafson MD - Primary     * Farhat Mayo MD - Fellow - Assisting  Anesthesia: General   Estimated Blood Loss: Less than 10 ml    Drains: None  Specimens:   ID Type Source Tests Collected by Time Destination   1 : portion of right thyroid nodule Tissue Other SURGICAL PATHOLOGY EXAM Shonna Gustafson MD 10/15/2024 11:20 AM    2 : right superior parathyroid -evaluate for parathyroid tissue Tissue Parathyroid SURGICAL PATHOLOGY EXAM Shonna Gustafson MD 10/15/2024 11:38 AM    4 : right lobe of thyroid and isthmus Tissue Thyroid, Right SURGICAL PATHOLOGY EXAM Shonna Gustafson MD 10/15/2024 11:56 AM    A : post PTH Blood Foot, Right PARATHYROID HORMONE INTACT Soife Caldwell APRN CRNA 10/15/2024 12:10 PM    B : pre PTH Blood Foot, Left PARATHYROID HORMONE INTACT Paul Barboza 10/15/2024 10:48 AM      Findings:   None.  Complications: None.  Implants: * No implants in log *

## 2024-10-15 NOTE — ANESTHESIA CARE TRANSFER NOTE
Patient: Ann Vargas    Procedure: Procedure(s):  RIGHT THYROIDECTOMY AND ISTHMUSECTOMY for intrathyroidal parathyroid       Diagnosis: Hyperparathyroidism (H24) [E21.3]  Diagnosis Additional Information: No value filed.    Anesthesia Type:   General     Note:    Oropharynx: oropharynx clear of all foreign objects and spontaneously breathing  Level of Consciousness: awake  Oxygen Supplementation: face mask  Level of Supplemental Oxygen (L/min / FiO2): 6  Independent Airway: airway patency satisfactory and stable  Dentition: dentition unchanged  Vital Signs Stable: post-procedure vital signs reviewed and stable  Report to RN Given: handoff report given  Patient transferred to: PACU    Handoff Report: Identifed the Patient, Identified the Reponsible Provider, Reviewed the pertinent medical history, Discussed the surgical course, Reviewed Intra-OP anesthesia mangement and issues during anesthesia, Set expectations for post-procedure period and Allowed opportunity for questions and acknowledgement of understanding    Vitals:  Vitals Value Taken Time   /68 10/15/24 1232   Temp 36.2  C (97.2  F) 10/15/24 1232   Pulse 101 10/15/24 1238   Resp 23 10/15/24 1238   SpO2 93 % 10/15/24 1238   Vitals shown include unfiled device data.    Electronically Signed By: Paul Barboza  October 15, 2024  12:39 PM

## 2024-10-15 NOTE — DISCHARGE INSTRUCTIONS
"[facility]  Home Care Following Anesthesia  For 24 hours after surgery:  Get plenty of rest.  A responsible adult must stay with you for at least 24 hours after you leave the surgery center.  Do not drive or use heavy equipment.  If you have weakness or tingling, don't drive or use heavy equipment until this feeling goes away.   Do not drink alcohol.   Avoid strenuous or risky activities.  Ask for help when climbing stairs.  You may feel lightheaded.  IF so, sit for a few minutes before standing.  Have someone help you get up.   If you have nausea (feel sick to your stomach): Drink only clear liquids such as apple juice, ginger ale, broth or 7-Up.  Rest may also help.  Be sure to drink enough fluids.  Move to a regular diet as you feel able.   You may have a slight fever.  Call the doctor if your fever is over 100 F (37.7 C) (taken under the tongue) or lasts longer than 24 hours.  You may have a dry mouth, a sore throat, muscle aches or trouble sleeping. These should go away after 24 hours.  Do not make important or legal decisions.   It is recommended to avoid smoking.        Today you received a Marcaine or bupivacaine block to numb the nerves near your surgery site.  This is a block using local anesthetic or \"numbing\" medication injected around the nerves to anesthetize or \"numb\" the area supplied by those nerves.  This block is injected into the muscle layer near your surgical site.  The medication may numb the location where you had surgery for 6-18 hours, but may last up to 24 hours.  If your surgical site is an arm or leg you should be careful with your affected limb, since it is possible to injure your limb without being aware of it due to the numbing.  Until full feeling returns, you should guard against bumping or hitting your limb, and avoid extreme hot or cold temperatures on the skin.  As the block wears off, the feeling will return as a tingling or prickly " sensation near your surgical site.  You will experience more discomfort from your incision as the feeling returns.  You may want to take a pain pill (a narcotic or Tylenol if this was prescribed by your surgeon) when you start to experience mild pain before the pain beccomes more severe.  If your pain medications do not control your pain you should notifiy your surgeon.    Tips for taking pain medications  To get the best pain relief possible, remember these points:  Take pain medications as directed, before pain becomes severe.  Pain medication can upset your stomach: taking it with food may help.  Constipation is a common side effect of pain medication. Drink plenty of  fluids.  Eat foods high in fiber. Take a stool softener if recommended by your doctor or pharmacist.  Do not drink alcohol, drive or operate machinery while taking pain medications.  Ask about other ways to control pain, such as with heat, ice or relaxation.    Tylenol/Acetaminophen Consumption    If you feel your pain relief is insufficient, you may take Tylenol/Acetaminophen in addition to your narcotic pain medication.   Be careful not to exceed 4,000 mg of Tylenol/Acetaminophen in a 24 hour period from all sources.  If you are taking extra strength Tylenol/acetaminophen (500 mg), the maximum dose is 8 tablets in 24 hours.  If you are taking regular strength acetaminophen (325 mg), the maximum dose is 12 tablets in 24 hours.    Call a doctor for any of the following:  Signs of infection (fever, growing tenderness at the surgery site, a large amount of drainage or bleeding, severe pain, foul-smelling drainage, redness, swelling).  It has been over 8 to 10 hours since surgery and you are still not able to urinate (pass water).  Headache for over 24 hours.  Numbness, tingling or weakness the day after surgery (if you had spinal anesthesia).  Signs of Covid-19 infection (temperature over 100 degrees, shortness of breath, cough, loss of taste/smell,  generalized body aches, persistent headache, chills, sore throat, nausea/vomiting/diarrhea)  Your doctor is:  Dr. Shonna Gustafson, ENT Otolaryngology: [phone]                    Or dial [phone] and ask for the resident on call for:  ENT Otolaryngology  For emergency care, call the:  [facility]:  [phone] (TTY for hearing impaired: [phone])  Tylenol 975 mg given at 9:27 AM.   Ok to take more after 3:27 PM.

## 2024-10-15 NOTE — ANESTHESIA POSTPROCEDURE EVALUATION
Patient: Ann Vargas    Procedure: Procedure(s):  RIGHT THYROIDECTOMY AND ISTHMUSECTOMY for intrathyroidal parathyroid       Anesthesia Type:  General    Note:  Disposition: Outpatient   Postop Pain Control: Uneventful            Sign Out: Well controlled pain   PONV:    Neuro/Psych: Uneventful            Sign Out: Other neuro status   Airway/Respiratory: Uneventful            Sign Out: Acceptable/Baseline resp. status   CV/Hemodynamics: Uneventful            Sign Out: Acceptable CV status; No obvious hypovolemia; No obvious fluid overload   Other NRE: NONE   DID A NON-ROUTINE EVENT OCCUR?            Last vitals:  Vitals Value Taken Time   /75 10/15/24 1255   Temp 36.1  C (97  F) 10/15/24 1255   Pulse 93 10/15/24 1255   Resp 16 10/15/24 1255   SpO2 89 % 10/15/24 1255       Electronically Signed By: Heladio Garcia MD  October 15, 2024  1:47 PM

## 2024-10-15 NOTE — ANESTHESIA PREPROCEDURE EVALUATION
Anesthesia Pre-Procedure Evaluation    Patient: Ann Vargas   MRN: [id number] : 1965        Procedure : Procedure(s):  PARATHYROIDECTOMY          Past Medical History:   Diagnosis Date    Benign essential hypertension 2020    Breast cancer (H)     Chronic rhinitis 10/23/2006    10/23/2006 Had been on shots--now on singulair  Working well    Complication of anesthesia     Diagnostic skin and sensitization tests (aka ALLERGENS)     3/11/15 IgE tests NEGATIVE for orange, tomato, strawberry, apple--cherry, g. pepper and mushrrom are pending.    GERD (gastroesophageal reflux disease)     Hyperlipidemia     PONV (postoperative nausea and vomiting)     Unexplained endometrial cells on cervical cytology 12/17/15    2/15/16 emb scheduled.       Past Surgical History:   Procedure Laterality Date    BIOPSY BREAST      C/SECTION, CLASSICAL  Aug 93 &     , Classical x 2    EYE SURGERY  ?    detachted retina surgery    LUMPECTOMY BREAST      LUMPECTOMY BREAST WITH SEED LOCALIZATION Left 2014    Procedure: LUMPECTOMY BREAST WITH SEED LOCALIZATION;  Surgeon: Shonna Leung MD;  Location: [facility]    LUMPECTOMY BREAST WITH SENTINEL NODE, COMBINED Left 2014    Procedure: COMBINED LUMPECTOMY BREAST WITH SENTINEL NODE;  Surgeon: Shonna Leung MD;  Location: [facility]    PHACOEMULSIFICATION WITH STANDARD INTRAOCULAR LENS IMPLANT Right 2020    Procedure: Cataract Removal with Implant;  Surgeon: Ricki Luo MD;  Location: [facility]    PHACOEMULSIFICATION WITH STANDARD INTRAOCULAR LENS IMPLANT Left 2020    Procedure: Cataract Removal with Implant;  Surgeon: Ricki Luo MD;  Location: [facility]    SURGICAL HISTORY OF -       detached retina      Allergies   Allergen Reactions    Yellow Dye Anaphylaxis     Yellow dye #5/#6       Social History     Tobacco Use    Smoking status: Never    Smokeless tobacco: Never   Substance Use Topics     "Alcohol use: Yes     Alcohol/week: 0.0 standard drinks of alcohol     Comment: -monthly      Wt Readings from Last 1 Encounters:   09/19/24 118.4 kg (261 lb)        Anesthesia Evaluation   Pt has had prior anesthetic. Type: General and MAC.    History of anesthetic complications  - PONV.      ROS/MED HX  ENT/Pulmonary:     (+)           allergic rhinitis,                             Neurologic:  - neg neurologic ROS     Cardiovascular:     (+) Dyslipidemia hypertension- -   -  - -                                      METS/Exercise Tolerance:     Hematologic:       Musculoskeletal:       GI/Hepatic:     (+) GERD,                   Renal/Genitourinary:  - neg Renal ROS     Endo: Comment: Hyperparathyroidism    (+)               Obesity,       Psychiatric/Substance Use:  - neg psychiatric ROS     Infectious Disease:       Malignancy:  - neg malignancy ROS     Other:  - neg other ROS          Physical Exam    Airway        Mallampati: II   TM distance: > 3 FB   Neck ROM: full   Mouth opening: > 3 cm    Respiratory Devices and Support         Dental       (+) Minor Abnormalities - some fillings, tiny chips      Cardiovascular   cardiovascular exam normal          Pulmonary   pulmonary exam normal                OUTSIDE LABS:  CBC:   Lab Results   Component Value Date    WBC 9.8 02/13/2015    WBC 4.7 11/04/2014    HGB 12.2 02/13/2015    HGB 12.5 11/04/2014    HCT 39.1 02/13/2015    HCT 39.7 11/04/2014     02/13/2015     11/04/2014     BMP:   Lab Results   Component Value Date     09/19/2024     09/22/2023    POTASSIUM 4.7 09/19/2024    POTASSIUM 4.5 09/22/2023    CHLORIDE 105 09/19/2024    CHLORIDE 103 09/22/2023    CO2 26 09/19/2024    CO2 28 09/22/2023    BUN 10.9 09/19/2024    BUN 15.4 09/22/2023    CR 0.76 09/19/2024    CR 0.72 09/22/2023     (H) 09/19/2024     (H) 09/22/2023     COAGS: No results found for: \"PTT\", \"INR\", \"FIBR\"  POC:   Lab Results   Component Value Date    BG " "107 (H) 11/07/2011    HCG Negative 11/19/2014    HCGS Negative 12/15/2012     HEPATIC:   Lab Results   Component Value Date    ALBUMIN 4.2 10/23/2023    PROTTOTAL 7.5 09/22/2023    ALT 37 09/22/2023    AST 28 09/22/2023    ALKPHOS 121 (H) 09/22/2023    BILITOTAL 0.4 09/22/2023     OTHER:   Lab Results   Component Value Date    A1C 5.4 03/31/2022    ANGELLA 10.4 09/19/2024    PHOS 2.6 10/23/2023    MAG 2.2 10/23/2023    LIPASE 44 12/15/2012    TSH 2.57 12/17/2015    T4 0.97 09/13/2010    CRP 7.6 03/11/2015    SED 35 (H) 03/11/2015       Anesthesia Plan    ASA Status:  2       Anesthesia Type: General.     - Airway: ETT   Induction: Intravenous.   Maintenance: TIVA.        Consents    Anesthesia Plan(s) and associated risks, benefits, and realistic alternatives discussed. Questions answered and patient/representative(s) expressed understanding.     - Discussed: Risks, Benefits and Alternatives for BOTH SEDATION and the PROCEDURE were discussed     - Discussed with:  Patient      - Extended Intubation/Ventilatory Support Discussed: No.      - Patient is DNR/DNI Status: No     Use of blood products discussed: No .     Postoperative Care    Pain management: IV analgesics, Multi-modal analgesia, Oral pain medications.   PONV prophylaxis: Ondansetron (or other 5HT-3), Background Propofol Infusion, Dexamethasone or Solumedrol     Comments:               Rodrigo Felton MD    I have reviewed the pertinent notes and labs in the chart from the past 30 days and (re)examined the patient.  Any updates or changes from those notes are reflected in this note.               # Hypertension: Noted on problem list         # Severe Obesity: Estimated body mass index is 42.13 kg/m  as calculated from the following:    Height as of 9/19/24: 1.676 m (5' 6\").    Weight as of 9/19/24: 118.4 kg (261 lb).             "

## 2024-10-18 NOTE — OP NOTE
Pre-operative diagnosis:         Hyperparathyroidism (H24) [E21.3]  Post-operative diagnosis        Same as pre-operative diagnosis     Procedure:      RIGHT THYROIDECTOMY AND ISTHMUSECTOMY for intrathyroidal parathyroid, N/A - Neck     Surgeon:         Surgeons and Role:     * Shonna Gustafson MD - Primary     * Farhat Mayo MD - Fellow - Assisting  Anesthesia:     General             Estimated Blood Loss: Less than 10 ml     Drains: None  Specimens:       ID Type Source Tests Collected by Time Destination   1 : portion of right thyroid nodule Tissue Other SURGICAL PATHOLOGY EXAM Shonna Gustafson MD 10/15/2024 11:20 AM     2 : right superior parathyroid -evaluate for parathyroid tissue Tissue Parathyroid SURGICAL PATHOLOGY EXAM Shonna Gustafson MD 10/15/2024 11:38 AM     4 : right lobe of thyroid and isthmus Tissue Thyroid, Right SURGICAL PATHOLOGY EXAM Shonna Gustafson MD 10/15/2024 11:56 AM     A : post PTH Blood Foot, Right PARATHYROID HORMONE INTACT Sofie Caldwell APRN CRNA 10/15/2024 12:10 PM     B : pre PTH Blood Foot, Left PARATHYROID HORMONE INTACT Paul Barboza 10/15/2024 10:48 AM        Clinical indications for the procedure:  This is a 58-year-old female who was noted to be hypercalcemic.  A workup ensued that confirmed primary hyperparathyroidism.  A localizing study initially was a CT parathyroid scan that did not localize an adenoma but did raise concern for a right thyroid nodule.  A follow-up sestamibi scan identified an area of concern on the right side and then a ultrasound of the thyroid confirmed some nodules within the thyroid gland but the largest measuring 3.1 cm.  This led to a biopsy.  Biopsy was benign.  We discussed with the patient the possibility of a right thyroid lobectomy and isthmusectomy as well as a neck exploration resection of a parathyroid adenoma or adenomas.  The risks were also discussed including but not limited to bleeding infection injury to the  recurrent laryngeal nerve or nerves potential permanent hypocalcemia or loss of airway.  A consent was then obtained.    Preincision parathyroid hormone level: 177  15-minute post excision PTH: 11    Details of the procedure:  The patient was brought to the operating room in stable condition placed in the operating room table in supine position.  After appropriate general anesthesia was obtained the patient was prepped and draped in a sterile fashion.  A timeout was then performed.  A 4 cm incision was made over the anterior neck following a natural skin crease.  The platysma was then divided and subplatysmal planes were then created.  The strap muscles were divided at the midline and retracted laterally.  As we entered into the right side of her neck she did have a soft well-circumscribed nodule in the mid to inferior portion of her thyroid gland.  This was thought to be the benign adenoma.  The capsule was inadvertently ruptured.  A portion of this was then sent to pathology for frozen section evaluation to determine if there was a malignancy or not.  As we rotated the lobe of the thyroid gland medially the right middle thyroid vein was then clipped and divided.  We were able to identify the right recurrent laryngeal nerve.  I was unable to localize a right inferior parathyroid adenoma.  Therefore we carried our dissection cephalad and I did identify a mildly enlarged right superior parathyroid gland.  This was dissected from the undersurface of the thyroid and again appeared large and had concerning characteristics therefore this was resected and sent for pathologic evaluation.  We then inspected inferiorly again and could not localize an inferior parathyroid adenoma.  Therefore we inspected the left neck briefly and identified a left inferior parathyroid gland that was of normal size and normal position.  We also identified the left recurrent laryngeal nerve.  This was intact visibly as well as with nerve  stimulation.    At this point pathology confirmed that the larger nodule within the thyroid gland was indeed a parathyroid adenoma and had hypercellular parathyroid on frozen section.  We then proceeded to resect the entire right lobe of the thyroid.  The superior pole of the right lobe of the thyroid gland is retracted inferolaterally.  The superior thyroidal vessels were separately skeletonized clipped and divided.  The middle thyroid vein had already been previously divided.  With our view of the recurrent laryngeal nerve we then identified the right inferior thyroidal vessels.  These were separately skeletonized clipped and divided.  We continued rotating the gland from lateral to medial manner dissecting it over the anterior portion of the trachea.  The superior and inferior veins draining the isthmus were clipped and divided.  The thyroid was then divided to the left of the isthmus using the LigaSure.  The specimen was sent for permanent pathologic evaluation.  At this point we had a second blood draw for her parathyroid hormone.  She had an appropriate drop in her PTH.  We confirmed the right recurrent laryngeal nerve was intact visibly as well as with nerve stimulation.  Fibrillar was then placed in the operative bed.  The strap muscles were approximated the midline using a 3-0 chromic interrupted suture.  The platysma was approximated using a 3-0 chromic interrupted suture.  And the skin incision was approximated using a 5-0 Monocryl running subcuticular suture.  The wound was dressed with Dermabond.  The patient was then extubated and returned to the recovery room stable condition.  I was present during the entire surgical procedure.    Shonna Gustafson MD

## 2024-10-21 ENCOUNTER — VIRTUAL VISIT (OUTPATIENT)
Dept: ENDOCRINOLOGY | Facility: CLINIC | Age: 59
End: 2024-10-21
Payer: COMMERCIAL

## 2024-10-21 DIAGNOSIS — E21.3 HYPERPARATHYROIDISM (H): Primary | ICD-10-CM

## 2024-10-21 LAB
PATH REPORT.COMMENTS IMP SPEC: NORMAL
PATH REPORT.COMMENTS IMP SPEC: NORMAL
PATH REPORT.FINAL DX SPEC: NORMAL
PATH REPORT.GROSS SPEC: NORMAL
PATH REPORT.INTRAOP OBS SPEC DOC: NORMAL
PATH REPORT.MICROSCOPIC SPEC OTHER STN: NORMAL
PATH REPORT.RELEVANT HX SPEC: NORMAL
PHOTO IMAGE: NORMAL

## 2024-10-21 PROCEDURE — 99214 OFFICE O/P EST MOD 30 MIN: CPT | Mod: 24 | Performed by: INTERNAL MEDICINE

## 2024-10-21 PROCEDURE — G2211 COMPLEX E/M VISIT ADD ON: HCPCS | Mod: 95 | Performed by: INTERNAL MEDICINE

## 2024-10-21 ASSESSMENT — PAIN SCALES - GENERAL: PAINLEVEL: NO PAIN (0)

## 2024-10-21 NOTE — NURSING NOTE
Current patient location: [id number]  [facility] [id number]    Is the patient currently in the state of [place]? YES    Visit mode:VIDEO    If the visit is dropped, the patient can be reconnected by: VIDEO VISIT: Text to cell phone:   Telephone Information:   Mobile [phone]       Will anyone else be joining the visit? NO  (If patient encounters technical issues they should call [phone]    Are changes needed to the allergy or medication list? No    Are refills needed on medications prescribed by this physician? Discuss with provider    Rooming Documentation:  Not applicable    Reason for visit: RECHECK (6 month follow-up )    Hilary TREADWELL

## 2024-10-21 NOTE — PROGRESS NOTES
Virtual Visit Details    Type of service:  Video Visit     Originating Location (pt. Location): Home    Distant Location (provider location):  Off-site  Platform used for Video Visit: [facility]          Endocrine Clinic Consult        Follow up for primary hyperparathyroidism      Assessment   Ann Vargas is a 58 year old woman St post parathyroidectomy on 10/15/24. Surgical pathology show intrathyroidal parathyroid adenoma. PTH decreased during surgery to 9. With this she is at risk for post-surgical hypocalcemia and has mild symptoms with mild intermittent carpal spasms  St post right thyroid lobe and isthmus resection. Check TSH in 2 weeks  nd to  be benign.    Plan  Start Tums one table in the evening with dinner    Lab draw in 2 weeks for thyroid hormone levels, Vitamin D, calcium and PTHi    Follow up with me in 6 months    The longitudinal plan of care for the primary hyperparathyroidism as documented were addressed during this visit. Due to the added complexity in care, I will continue to support Ann in the subsequent management and with ongoing continuity of care.The longitudinal plan of care for the diagnosis(es)/condition(s) as documented were addressed during this visit. Due to the added complexity in care, I will continue to support Ann in the subsequent management and with ongoing continuity of care.    Farida Sanchez MD  Endocrinology and Diabetes  Telephone contact:  [facility] [phone]  [facility] [phone]          Interval histroy  6 m follow up  Pt had parathyroid resection last week with right lobe and isthmus resection showing parathyroid adenoma in the right superior nodule.  PTHi decrease <10 intra-op  Pt notes carpal spasms upon questioning        Past Medical History:   Diagnosis Date    Benign essential hypertension 11/9/2020    Breast cancer (H)     Chronic rhinitis 10/23/2006    10/23/2006 Had been on shots--now on  singulair  Working well    Complication of anesthesia     Diagnostic skin and sensitization tests (aka ALLERGENS)     3/11/15 IgE tests NEGATIVE for orange, tomato, strawberry, apple--cherry, g. pepper and mushrrom are pending.    GERD (gastroesophageal reflux disease)     Hyperlipidemia     PONV (postoperative nausea and vomiting)     Unexplained endometrial cells on cervical cytology 12/17/15    2/15/16 emb scheduled.      Past Surgical History:   Procedure Laterality Date    BIOPSY BREAST      C/SECTION, CLASSICAL  Aug 93 &     , Classical x 2    EYE SURGERY  ?    detachted retina surgery    LUMPECTOMY BREAST      LUMPECTOMY BREAST WITH SEED LOCALIZATION Left 2014    Procedure: LUMPECTOMY BREAST WITH SEED LOCALIZATION;  Surgeon: Shonna Leung MD;  Location: [facility] OR    LUMPECTOMY BREAST WITH SENTINEL NODE, COMBINED Left 2014    Procedure: COMBINED LUMPECTOMY BREAST WITH SENTINEL NODE;  Surgeon: Shonna Leung MD;  Location: [facility]    PARATHYROIDECTOMY N/A 10/15/2024    Procedure: RIGHT THYROIDECTOMY AND ISTHMUSECTOMY for intrathyroidal parathyroid;  Surgeon: Shonna Gustafson MD;  Location: [facility] OR    PHACOEMULSIFICATION WITH STANDARD INTRAOCULAR LENS IMPLANT Right 2020    Procedure: Cataract Removal with Implant;  Surgeon: Ricki Luo MD;  Location: [facility]    PHACOEMULSIFICATION WITH STANDARD INTRAOCULAR LENS IMPLANT Left 2020    Procedure: Cataract Removal with Implant;  Surgeon: Ricki Luo MD;  Location: [facility]    SURGICAL HISTORY OF -       detached retina     Family History   Problem Relation Age of Onset    Hypertension Mother     Lipids Mother     Depression Mother         bipolar, also hysterectomy for fibroid    Genitourinary Problems Mother         Kidney  - on transplant list    Allergies Mother     Obesity Mother     Cancer Mother         skin cancer    Hyperlipidemia Mother     Mental Illness Mother          Bipolar    Lipids Brother     Allergies Father     Obesity Father     Cancer Father         skin cancer    Cancer Maternal Grandmother         ovarian    Cancer Maternal Grandfather         lung     Social History     Socioeconomic History    Marital status:      Spouse name: Not on file    Number of children: Not on file    Years of education: Not on file    Highest education level: Not on file   Occupational History    Not on file   Tobacco Use    Smoking status: Never    Smokeless tobacco: Never   Vaping Use    Vaping status: Never Used   Substance and Sexual Activity    Alcohol use: Yes     Alcohol/week: 0.0 standard drinks of alcohol     Comment: -monthly    Drug use: No    Sexual activity: Not Currently     Partners: Male     Birth control/protection: Post-menopausal   Other Topics Concern    Parent/sibling w/ CABG, MI or angioplasty before 65F 55M? No   Social History Narrative    Not on file     Social Determinants of Health     Financial Resource Strain: Not on file   Food Insecurity: Not on file   Transportation Needs: Not on file   Physical Activity: Not on file   Stress: Not on file   Social Connections: Not on file   Interpersonal Safety: Low Risk  (10/15/2024)    Interpersonal Safety     Do you feel physically and emotionally safe where you currently live?: Yes     Within the past 12 months, have you been hit, slapped, kicked or otherwise physically hurt by someone?: No     Within the past 12 months, have you been humiliated or emotionally abused in other ways by your partner or ex-partner?: No   Housing Stability: Not on file        Allergies   Allergen Reactions    Yellow Dye Anaphylaxis     Yellow dye #5/#6      Current Outpatient Medications   Medication Sig Dispense Refill    acetaminophen (TYLENOL) 325 MG tablet Take 2 tablets (650 mg) by mouth every 4 hours as needed for mild pain. 50 tablet 0    EPINEPHrine (ADRENACLICK) 0.3 MG/0.3ML injection 2-pack Inject 0.3 mLs (0.3 mg) into the  muscle as needed for anaphylaxis 0.6 mL 2    lisinopril (ZESTRIL) 20 MG tablet Take 1 tablet (20 mg) by mouth daily. 90 tablet 3    montelukast (SINGULAIR) 10 MG tablet Take 1 tablet (10 mg) by mouth at bedtime. 90 tablet 3    omeprazole (PRILOSEC) 40 MG DR capsule TAKE 1 CAPSULE DAILY 90 capsule 3    simvastatin (ZOCOR) 40 MG tablet TAKE 1 TABLET AT BEDTIME 90 tablet 3    timolol (TIMOPTIC) 0.5 % ophthalmic solution 2 times daily as needed      vitamin D3 (CHOLECALCIFEROL) 50 mcg (2000 units) tablet Take 1 tablet by mouth daily.       No current facility-administered medications for this visit.         Exam:  Reported vitals:  There were no vitals taken for this visit.   healthy, alert and no distress  PSYCH: Alert and oriented times 3; coherent speech, normal   rate and volume, able to articulate logical thoughts, able   to abstract reason, no tangential thoughts, no hallucinations   or delusions  Her affect is normal and pleasant  RESP: No cough, no audible wheezing, able to talk in full sentences  Remainder of exam unable to be completed due to telephone visits       Labs:  Specimens  A Other, portion of right thyroid nodule  B Parathyroid, right superior parathyroid -evaluate for parathyroid tissue  C Thyroid, Right, right lobe of thyroid and isthmus  .  .  Final Diagnosis  A. PORTION OF RIGHT THYROID NODULE:  - Enlarged and hypercellular parathyroid gland tissue, weight: 450 milligrams  A. PARATHYROID, RIGHT SUPERIOR:  - Parathyroid gland tissue present  C. RIGHT LOBE OF THYROID AND ISTHMUS:  - Multinodular follicular hyperplasia with dominant nodule, 1.2 cm  This is an appended report. These results have been appended to a previously preliminary verified report.  Electronically signed by Henrietta Chang MD on 10/21/2024 at 12:45 PM  .  Clinical Information  Procedure: parathyroidectomy  Pre-op Diagnosis: Hyperparathyroidism (H24) [E21.3]  This is an appended report. These results have been appended  to a previously preliminary verified report.  Intraoperative Consultation  A(1). Other, portion of right thyroid nodule:  AFS1:  Parathyroid tissue, 0.45 g  Rita Watson MD on 10/15/2024 at 12:03 PM  Intra op performed at:[facility], [facility], [address], [address] [facility], [address] [address]  Intra-op Dx verbally delivered to Shonna Gustafson MD B(2). Parathyroid, right superior parathyroid -evaluate for parathyroid tissue:  BFS1:  [id number] Printed: 10/21/2024 12:45 PM  Page: 1 of 3    Lab Results   Component Value Date    ANGELLA 10.4 09/19/2024    ANGELLA 10.9 (H) 10/23/2023    ANGELLA 11.2 (H) 09/22/2023    ANGELLA 10.2 (H) 03/31/2022    ALBUMIN 4.2 10/23/2023    ICAW 5.4 (H) 09/19/2024    ALT 37 09/22/2023    PHOS 2.6 10/23/2023    PTHI 9 (L) 10/15/2024    PTHI 11 (L) 10/15/2024    PTHI 177 (H) 10/15/2024    PTHI 83 (H) 10/23/2023    PTHI 86 (H) 09/29/2023    AST 28 09/22/2023    BILITOTAL 0.4 09/22/2023    CR 0.76 09/19/2024    CR 0.72 09/22/2023    CR 0.72 03/31/2022     09/19/2024    TSH 2.57 12/17/2015    ALKPHOS 121 (H) 09/22/2023    VITDT 17 (L) 09/29/2023    HGB 12.2 02/13/2015                   Results for orders placed during the hospital encounter of 11/09/23    US Head Neck Soft Tissue    Narrative  US HEAD AND NECK SOFT TISSUE 11/9/2023 2:29 PM    CLINICAL HISTORY: Patient with primary hyperparathyroidism, negative  parathyroid CT. Evaluate for enlarged parathyroid. Hyperparathyroidism  (H24).    TECHNIQUE: Thyroid ultrasound.    COMPARISON: 2/13/2015.    FINDINGS:  RIGHT lobe: 5.9 x 2 x 1.9 cm. Homogeneous echotexture.  Isthmus: 2 mm.  LEFT lobe: 4.8 x 1.6 x 1.5 cm. Homogeneous echotexture.    NECK: No cervical lymphadenopathy. No parathyroid mass or nodule is  seen.    NODULES:    Nodule 1: Right mid pole thyroid nodule measuring 31 x 19 x 18 mm  Composition: Solid or almost completely solid, 2 points  Echogenicity: Hyperechoic or isoechoic, 1  point  Shape: Not taller than wide, 0 points  Margin: Smooth, 0 points  Echogenic Foci: Punctate echoic foci, 3 points  Point Total: 4-6 points. TI-RADS 4. If 1.5 cm or larger, recommend  FNA; if 1 cm or larger, follow up US (annually for 5 years).    Nodule 2: Right lower pole thyroid nodule measuring 8 x 7 x 6 mm  Composition: Solid or almost completely solid, 2 points  Echogenicity: Hypoechoic, 2 points  Shape: Not taller than wide, 0 points  Margin: Smooth, 0 points  Echogenic Foci: None, or large comet-tail artifacts, 0 points  Point Total: 4-6 points. TI-RADS 4. If 1.5 cm or larger, recommend  FNA; if 1 cm or larger, follow up US (annually for 5 years).    Nodule 3: Left mid pole thyroid nodule measuring 9 x 7 x 7 mm  Composition: Unable to determine, 2 points  Echogenicity: Hypoechoic, 2 points  Shape: Not taller than wide, 0 points  Margin: Smooth, 0 points  Echogenic Foci: None, or large comet-tail artifacts, 0 points  Point Total: 4-6 points. TI-RADS 4. If 1.5 cm or larger, recommend  FNA; if 1 cm or larger, follow up US (annually for 5 years).    Impression  IMPRESSION:  1.  Bilateral thyroid nodules. Dominant right thyroid nodule measuring  up to 3.1 cm meets criteria for fine needle aspiration.  2.  No sonographic evidence of a parathyroid lesion.    Nodules are characterized per  ACR Thyroid Imaging, Reporting and Data System (TI-RADS): White Paper  of the ACR TI-RADS Committee  Cheikh Diego., et al. Journal of the American College of  Radiology 2017. Volume 14 (2017), Issue 5, 383-716.    ROXANA ARBOLEDA MD      SYSTEM ID:  [id number]        Results for orders placed during the hospital encounter of 12/08/23    NM Parathyroid Planar Imaging Single    Narrative  EXAM: NM PARATHYROID PLANAR IMAGING SINGLE ISOTOPE  LOCATION: [facility]  DATE: 12/8/2023    INDICATION: Primary hyperparathyroidism  COMPARISON: Parathyroid CT dated 10/29/2023.  TECHNIQUE: 26.1 mCi  technetium-99m sestamibi, IV. Anterior planar images of the neck and chest at 15 minutes and 2 hours post injection.    FINDINGS: Subtle radiotracer uptake along the posteroinferior aspect of the right inferior thyroid lobe suspicious for a parathyroid adenoma.    Impression  IMPRESSION:    Findings suspicious for a parathyroid adenoma along the posteroinferior aspect of the right inferior thyroid lobe      Results for orders placed during the hospital encounter of 11/09/23    US Head Neck Soft Tissue    Narrative  US HEAD AND NECK SOFT TISSUE 11/9/2023 2:29 PM    CLINICAL HISTORY: Patient with primary hyperparathyroidism, negative  parathyroid CT. Evaluate for enlarged parathyroid. Hyperparathyroidism  (H24).    TECHNIQUE: Thyroid ultrasound.    COMPARISON: 2/13/2015.    FINDINGS:  RIGHT lobe: 5.9 x 2 x 1.9 cm. Homogeneous echotexture.  Isthmus: 2 mm.  LEFT lobe: 4.8 x 1.6 x 1.5 cm. Homogeneous echotexture.    NECK: No cervical lymphadenopathy. No parathyroid mass or nodule is  seen.    NODULES:    Nodule 1: Right mid pole thyroid nodule measuring 31 x 19 x 18 mm  Composition: Solid or almost completely solid, 2 points  Echogenicity: Hyperechoic or isoechoic, 1 point  Shape: Not taller than wide, 0 points  Margin: Smooth, 0 points  Echogenic Foci: Punctate echoic foci, 3 points  Point Total: 4-6 points. TI-RADS 4. If 1.5 cm or larger, recommend  FNA; if 1 cm or larger, follow up US (annually for 5 years).    Nodule 2: Right lower pole thyroid nodule measuring 8 x 7 x 6 mm  Composition: Solid or almost completely solid, 2 points  Echogenicity: Hypoechoic, 2 points  Shape: Not taller than wide, 0 points  Margin: Smooth, 0 points  Echogenic Foci: None, or large comet-tail artifacts, 0 points  Point Total: 4-6 points. TI-RADS 4. If 1.5 cm or larger, recommend  FNA; if 1 cm or larger, follow up US (annually for 5 years).    Nodule 3: Left mid pole thyroid nodule measuring 9 x 7 x 7 mm  Composition: Unable to determine,  2 points  Echogenicity: Hypoechoic, 2 points  Shape: Not taller than wide, 0 points  Margin: Smooth, 0 points  Echogenic Foci: None, or large comet-tail artifacts, 0 points  Point Total: 4-6 points. TI-RADS 4. If 1.5 cm or larger, recommend  FNA; if 1 cm or larger, follow up US (annually for 5 years).    Impression  IMPRESSION:  1.  Bilateral thyroid nodules. Dominant right thyroid nodule measuring  up to 3.1 cm meets criteria for fine needle aspiration.  2.  No sonographic evidence of a parathyroid lesion.    Nodules are characterized per  ACR Thyroid Imaging, Reporting and Data System (TI-RADS): White Paper  of the ACR TI-RADS Committee  Cheikh Diego., et al. Journal of the American College of  Radiology 2017. Volume 14 (2017), Issue 5, 100-211.    ROXANA ARBOLEDA MD      SYSTEM ID:  [id number]        Results for orders placed during the hospital encounter of 10/10/23    DX Hip/Pelvis/Spine    Narrative  EXAM: DX WRIST/HEEL/RADIUS, DX HIP/PELVIS/SPINE  LOCATION: [facility]  DATE: 10/10/2023    INDICATION: Postmenopausal female with a history of breast cancer. History of hyperparathyroidism.  DEMOGRAPHICS: Age- 57 years. Gender- Female. Menopausal status- Postmenopausal.  COMPARISON: 11/21/2017.  TECHNIQUE: Dual-energy x-ray absorptiometry (DXA) performed with routine technique. Forearm DXA performed due to hyperparathyroidism.    FINDINGS:    DXA RESULTS  -Lumbar Spine: L1-L4: BMD: 1.305 g/cm2. T-score: 0.9. Z-score: 0.9. I suspect degenerative changes throughout the lumbar spine.  -RIGHT Hip Total: BMD: 0.975 g/cm2. T-score: -0.3. Z-score: -0.2.  -RIGHT Hip Femoral neck: BMD: 0.935 g/cm2. T-score: -0.7. Z-score: -0.3.  -LEFT Hip Total: BMD: 0.983 g/cm2. T-score: -0.2. Z-score: -0.1.  -LEFT Hip Femoral neck: BMD: 0.845 g/cm2. T-score: -1.4. Z-score: -0.9.  -LEFT Radius 33%: BMD: 0.620 g/cm2. T-score: -1.3. Z-score: -0.4.    WHO T-SCORE CRITERIA  -Normal: T score at or above -1  SD  -Osteopenia: T score between -1 and -2.5 SD  -Osteoporosis: T score at or below -2.5 SD    The World Health Organization (WHO) criteria is applicable to perimenopausal females, postmenopausal females, and men aged 50 years or older.    INTERVAL CHANGE  -There has been a 1.8% decrease in L3-L4 lumbar spine BMD. Sclerotic changes likely seen throughout the lumbar spine.  -There has been a 6.4% decrease in bilateral hip BMD.    FRACTURE RISK  -FRAX Results: The 10 year probability of major osteoporotic fracture is 7.0%, and of hip fracture is 0.5%, based on left femoral neck BMD.    RECOMMENDATIONS  Consider treatment if major osteoporotic fracture score is greater than or equal to 20%, or if the hip fracture score is greater than or equal to 3%.    Impression  IMPRESSION: Low bone density (OSTEOPENIA). T score meets the WHO criteria for low bone density (osteopenia) at one or more measured sites. The risk of osteoporotic fracture increases approximately two-fold for each standard deviation decrease in T-score.

## 2024-10-21 NOTE — PATIENT INSTRUCTIONS
Start Tums one table in the evening with dinner    Lab draw in 2 weeks for thyroid hormone levels, Vitamin D, calcium and PTHi    Follow up with me in 6 months

## 2024-11-04 ENCOUNTER — LAB (OUTPATIENT)
Dept: LAB | Facility: CLINIC | Age: 59
End: 2024-11-04
Payer: COMMERCIAL

## 2024-11-04 DIAGNOSIS — E55.9 VITAMIN D DEFICIENCY: ICD-10-CM

## 2024-11-04 DIAGNOSIS — E21.3 HYPERPARATHYROIDISM (H): ICD-10-CM

## 2024-11-04 DIAGNOSIS — Z98.890 STATUS POST SURGERY: ICD-10-CM

## 2024-11-04 LAB
ALBUMIN SERPL BCG-MCNC: 4.2 G/DL (ref 3.5–5.2)
CALCIUM SERPL-MCNC: 9.5 MG/DL (ref 8.8–10.4)
MAGNESIUM SERPL-MCNC: 2.2 MG/DL (ref 1.7–2.3)
PHOSPHATE SERPL-MCNC: 3.9 MG/DL (ref 2.5–4.5)
PTH-INTACT SERPL-MCNC: 71 PG/ML (ref 15–65)
T4 FREE SERPL-MCNC: 0.87 NG/DL (ref 0.9–1.7)
TSH SERPL DL<=0.005 MIU/L-ACNC: 3.62 UIU/ML (ref 0.3–4.2)

## 2024-11-04 PROCEDURE — 36415 COLL VENOUS BLD VENIPUNCTURE: CPT

## 2024-11-04 PROCEDURE — 83970 ASSAY OF PARATHORMONE: CPT

## 2024-11-04 PROCEDURE — 83735 ASSAY OF MAGNESIUM: CPT

## 2024-11-04 PROCEDURE — 84443 ASSAY THYROID STIM HORMONE: CPT

## 2024-11-04 PROCEDURE — 82306 VITAMIN D 25 HYDROXY: CPT | Mod: 59

## 2024-11-04 PROCEDURE — 84439 ASSAY OF FREE THYROXINE: CPT

## 2024-11-04 PROCEDURE — 82310 ASSAY OF CALCIUM: CPT

## 2024-11-04 PROCEDURE — 82306 VITAMIN D 25 HYDROXY: CPT

## 2024-11-04 PROCEDURE — 82040 ASSAY OF SERUM ALBUMIN: CPT

## 2024-11-04 PROCEDURE — 84100 ASSAY OF PHOSPHORUS: CPT

## 2024-11-05 ENCOUNTER — TELEPHONE (OUTPATIENT)
Dept: ENDOCRINOLOGY | Facility: CLINIC | Age: 59
End: 2024-11-05
Payer: COMMERCIAL

## 2024-11-05 DIAGNOSIS — E21.3 HYPERPARATHYROIDISM (H): Primary | ICD-10-CM

## 2024-11-05 DIAGNOSIS — E55.9 VITAMIN D DEFICIENCY: Primary | ICD-10-CM

## 2024-11-05 LAB
DEPRECATED CALCIDIOL+CALCIFEROL SERPL-MC: <52 UG/L (ref 20–75)
VIT D+METAB SERPL-MCNC: 35 NG/ML (ref 20–50)
VITAMIN D2 SERPL-MCNC: <5 UG/L
VITAMIN D3 SERPL-MCNC: 47 UG/L

## 2024-11-05 NOTE — TELEPHONE ENCOUNTER
Labs    Farida Sanchez MD  Staff Physician  Division of Endocrinology  [facility]  Pager #[phone]

## 2024-11-06 NOTE — TELEPHONE ENCOUNTER
Lab    Farida Sanchez MD  Staff Physician  Division of Endocrinology  [facility]  Pager #[phone]

## 2024-11-10 ENCOUNTER — HEALTH MAINTENANCE LETTER (OUTPATIENT)
Age: 59
End: 2024-11-10

## 2024-12-02 ENCOUNTER — LAB (OUTPATIENT)
Dept: LAB | Facility: CLINIC | Age: 59
End: 2024-12-02
Payer: COMMERCIAL

## 2024-12-02 DIAGNOSIS — E21.3 HYPERPARATHYROIDISM (H): ICD-10-CM

## 2024-12-02 LAB
ALBUMIN SERPL BCG-MCNC: 3.9 G/DL (ref 3.5–5.2)
CALCIUM SERPL-MCNC: 9.3 MG/DL (ref 8.8–10.4)
PTH-INTACT SERPL-MCNC: 51 PG/ML (ref 15–65)
T4 FREE SERPL-MCNC: 0.84 NG/DL (ref 0.9–1.7)
TSH SERPL DL<=0.005 MIU/L-ACNC: 6.3 UIU/ML (ref 0.3–4.2)

## 2024-12-02 PROCEDURE — 84443 ASSAY THYROID STIM HORMONE: CPT

## 2024-12-02 PROCEDURE — 36415 COLL VENOUS BLD VENIPUNCTURE: CPT

## 2024-12-02 PROCEDURE — 82310 ASSAY OF CALCIUM: CPT

## 2024-12-02 PROCEDURE — 82040 ASSAY OF SERUM ALBUMIN: CPT

## 2024-12-02 PROCEDURE — 83970 ASSAY OF PARATHORMONE: CPT

## 2024-12-02 PROCEDURE — 84439 ASSAY OF FREE THYROXINE: CPT

## 2024-12-03 ENCOUNTER — MYC MEDICAL ADVICE (OUTPATIENT)
Dept: ENDOCRINOLOGY | Facility: CLINIC | Age: 59
End: 2024-12-03
Payer: COMMERCIAL

## 2024-12-03 ENCOUNTER — TELEPHONE (OUTPATIENT)
Dept: ENDOCRINOLOGY | Facility: CLINIC | Age: 59
End: 2024-12-03
Payer: COMMERCIAL

## 2024-12-03 DIAGNOSIS — E89.0 POSTSURGICAL HYPOTHYROIDISM: Primary | ICD-10-CM

## 2024-12-03 RX ORDER — LEVOTHYROXINE SODIUM 50 UG/1
50 TABLET ORAL DAILY
Qty: 90 TABLET | Refills: 1 | Status: SHIPPED | OUTPATIENT
Start: 2024-12-03

## 2024-12-03 NOTE — TELEPHONE ENCOUNTER
LT4 start    Farida Sanchez MD  Staff Physician  Division of Endocrinology  [facility]  Pager #[phone]

## 2024-12-10 ENCOUNTER — TELEPHONE (OUTPATIENT)
Dept: ENDOCRINOLOGY | Facility: CLINIC | Age: 59
End: 2024-12-10
Payer: COMMERCIAL

## 2024-12-10 DIAGNOSIS — E89.0 POSTSURGICAL HYPOTHYROIDISM: Primary | ICD-10-CM

## 2024-12-10 NOTE — TELEPHONE ENCOUNTER
Labs    Farida Sanchez MD  Staff Physician  Division of Endocrinology  [facility]  Pager #[phone]

## 2024-12-26 ENCOUNTER — LAB (OUTPATIENT)
Dept: LAB | Facility: CLINIC | Age: 59
End: 2024-12-26
Payer: COMMERCIAL

## 2024-12-26 DIAGNOSIS — E89.0 POSTSURGICAL HYPOTHYROIDISM: ICD-10-CM

## 2024-12-26 LAB
T4 FREE SERPL-MCNC: 0.97 NG/DL (ref 0.9–1.7)
TSH SERPL DL<=0.005 MIU/L-ACNC: 4.65 UIU/ML (ref 0.3–4.2)

## 2024-12-31 NOTE — PROGRESS NOTES
No  taking    Farida Sanchez MD  Staff Physician  Division of Endocrinology  [facility]  Pager #[phone]

## 2025-04-07 ENCOUNTER — LAB (OUTPATIENT)
Dept: LAB | Facility: CLINIC | Age: 60
End: 2025-04-07
Payer: COMMERCIAL

## 2025-04-07 DIAGNOSIS — E21.3 HYPERPARATHYROIDISM: ICD-10-CM

## 2025-04-07 DIAGNOSIS — E89.0 POSTSURGICAL HYPOTHYROIDISM: ICD-10-CM

## 2025-04-07 LAB
ALBUMIN SERPL BCG-MCNC: 4.2 G/DL (ref 3.5–5.2)
CALCIUM SERPL-MCNC: 9.6 MG/DL (ref 8.8–10.4)
CREAT SERPL-MCNC: 0.87 MG/DL (ref 0.51–0.95)
EGFRCR SERPLBLD CKD-EPI 2021: 75 ML/MIN/1.73M2
T4 FREE SERPL-MCNC: 0.84 NG/DL (ref 0.9–1.7)
TSH SERPL DL<=0.005 MIU/L-ACNC: 5.49 UIU/ML (ref 0.3–4.2)

## 2025-04-07 PROCEDURE — 82306 VITAMIN D 25 HYDROXY: CPT

## 2025-04-07 PROCEDURE — 84439 ASSAY OF FREE THYROXINE: CPT

## 2025-04-07 PROCEDURE — 82310 ASSAY OF CALCIUM: CPT

## 2025-04-07 PROCEDURE — 82040 ASSAY OF SERUM ALBUMIN: CPT

## 2025-04-07 PROCEDURE — 82565 ASSAY OF CREATININE: CPT

## 2025-04-07 PROCEDURE — 36415 COLL VENOUS BLD VENIPUNCTURE: CPT

## 2025-04-07 PROCEDURE — 84443 ASSAY THYROID STIM HORMONE: CPT

## 2025-04-08 LAB
DEPRECATED CALCIDIOL+CALCIFEROL SERPL-MC: <50 UG/L (ref 20–75)
VITAMIN D2 SERPL-MCNC: <5 UG/L
VITAMIN D3 SERPL-MCNC: 45 UG/L

## 2025-04-21 ENCOUNTER — VIRTUAL VISIT (OUTPATIENT)
Dept: ENDOCRINOLOGY | Facility: CLINIC | Age: 60
End: 2025-04-21
Payer: COMMERCIAL

## 2025-04-21 VITALS — WEIGHT: 270 LBS | BODY MASS INDEX: 43.39 KG/M2 | HEIGHT: 66 IN

## 2025-04-21 DIAGNOSIS — E21.3 HYPERPARATHYROIDISM: ICD-10-CM

## 2025-04-21 DIAGNOSIS — E89.0 POSTSURGICAL HYPOTHYROIDISM: Primary | ICD-10-CM

## 2025-04-21 DIAGNOSIS — R53.83 FATIGUE, UNSPECIFIED TYPE: ICD-10-CM

## 2025-04-21 PROCEDURE — 1126F AMNT PAIN NOTED NONE PRSNT: CPT | Mod: 95 | Performed by: INTERNAL MEDICINE

## 2025-04-21 PROCEDURE — 98006 SYNCH AUDIO-VIDEO EST MOD 30: CPT | Performed by: INTERNAL MEDICINE

## 2025-04-21 ASSESSMENT — PAIN SCALES - GENERAL: PAINLEVEL_OUTOF10: NO PAIN (0)

## 2025-04-21 NOTE — NURSING NOTE
Current patient location: [id number]  [facility] [address]    Is the patient currently in the state of [place]? YES    Visit mode: VIDEO    If the visit is dropped, the patient can be reconnected by:VIDEO VISIT: Text to cell phone:   Telephone Information:   Mobile [phone]       Will anyone else be joining the visit? NO  (If patient encounters technical issues they should call [phone]    Are changes needed to the allergy or medication list? No    Are refills needed on medications prescribed by this physician? Discuss with provider    Rooming Documentation:  Questionnaire(s) completed    Reason for visit: TERRIE Calero VVF

## 2025-04-21 NOTE — PATIENT INSTRUCTIONS
Start multivitamin once daily  Continue vitamin D 2000 units daily  Consider stopping vitamin B12    Recheck labs in about 3 months please make appointment

## 2025-04-21 NOTE — LETTER
4/21/2025      Ann Vargas  [id number]  [facility] [id number]      Dear Colleague,    Thank you for referring your patient, Ann Vargas, to the [facility]. Please see a copy of my visit note below.      Virtual Visit Details    Type of service:  Video Visit     Originating Location (pt. Location): Home    Distant Location (provider location):  Off-site  Platform used for Video Visit: [facility]                Endocrine Clinic Consult        Follow up for primary hyperparathyroidism      Assessment  Ann Vargas is a 59 year old woman St post parathyroidectomy on 10/15/24. Surgical pathology shows intrathyroidal parathyroid adenoma. PTH decreased during surgery to 9. With this she is at risk for post-surgical hypocalcemia and has mild symptoms with mild intermittent carpal spasms.  St post right thyroid lobe and isthmus resection which are benign.  Intolerance to levothyroxine at 50 mcg with significant symptoms.  Thyroid hormone levels in the subclinical range.  Treatment only indicated for clear symptoms of hypothyroidism.  Will refrain from replacement at this point.  Parathyroid hormone levels have improved significantly.  Calcium intake is recommended according to general recommendation for postmenopausal woman for 1200 1500 mg daily.  Would recommend to continue this vitamin D at 2000 units daily     Plan  Goal Calcium intake [id number] mg from food and If needed supplements  Vitamin D 4000 units daily  Will check vitamin B12 in 3 months with next labs  Recheck labs in 3 months for PTH TSH free T4 and calcium and vitamin B12  If unremarkable can follow-up with primary care    The longitudinal plan of care for the primary hyperparathyroidism as documented were addressed during this visit. Due to the added complexity in care, I will continue to support Ann in the subsequent management and with ongoing continuity of care.The longitudinal plan of care for the  diagnosis(es)/condition(s) as documented were addressed during this visit. Due to the added complexity in care, I will continue to support Ann in the subsequent management and with ongoing continuity of care.    Farida Sanchez MD  Endocrinology and Diabetes  Telephone contact:  [facility] [place] [phone]  [facility] [place] [phone]          Interval histroy  6 m follow up  Pt had parathyroid resection 10/15/2024 with right thyroid lobe and isthmus resection showing parathyroid adenoma in the right superior nodule.  PTHi decrease <10 intra-op  Pt notes carpal spasms upon questioning  Patient had been on Tums daily or every other day  We had started a trial of levothyroxine 50 mcg daily.  For subclinical hypothyroidism with fatigue.  With this patient had heart racing palpitations and felt quite unwell after 2 tablets.  Reluctant to restart even at lower dose considering this experience.      Symptoms of hypo- and hyperthyroidism:  Weight change no; heat or cold intolerance unclear; abnormal bowel movements no; hair loss yes, change in skin pattern no; anxietyno, depression no; fatigue yes; heart racing yes; tremors yes;      Past Medical History:   Diagnosis Date     Benign essential hypertension 2020     Breast cancer (H)      Chronic rhinitis 10/23/2006    10/23/2006 Had been on shots--now on singulair  Working well     Complication of anesthesia      Diagnostic skin and sensitization tests (aka ALLERGENS)     3/11/15 IgE tests NEGATIVE for orange, tomato, strawberry, apple--cherry, g. pepper and mushrrom are pending.     GERD (gastroesophageal reflux disease)      Hyperlipidemia      PONV (postoperative nausea and vomiting)      Unexplained endometrial cells on cervical cytology 12/17/15    2/15/16 emb scheduled.      Past Surgical History:   Procedure Laterality Date     BIOPSY BREAST       C/SECTION, CLASSICAL  Aug 93 &     , Classical x  2     EYE SURGERY  ?1992    detachted retina surgery     LUMPECTOMY BREAST       LUMPECTOMY BREAST WITH SEED LOCALIZATION Left 11/19/2014    Procedure: LUMPECTOMY BREAST WITH SEED LOCALIZATION;  Surgeon: Shonna Leung MD;  Location: [facility] OR     LUMPECTOMY BREAST WITH SENTINEL NODE, COMBINED Left 11/19/2014    Procedure: COMBINED LUMPECTOMY BREAST WITH SENTINEL NODE;  Surgeon: Shonna Leung MD;  Location: [facility] OR     PARATHYROIDECTOMY N/A 10/15/2024    Procedure: RIGHT THYROIDECTOMY AND ISTHMUSECTOMY for intrathyroidal parathyroid;  Surgeon: Shonna Gustafson MD;  Location: [facility] OR     PHACOEMULSIFICATION WITH STANDARD INTRAOCULAR LENS IMPLANT Right 8/17/2020    Procedure: Cataract Removal with Implant;  Surgeon: Ricki Luo MD;  Location: [facility] OR     PHACOEMULSIFICATION WITH STANDARD INTRAOCULAR LENS IMPLANT Left 9/9/2020    Procedure: Cataract Removal with Implant;  Surgeon: Ricki Luo MD;  Location: [facility] OR     SURGICAL HISTORY OF -   1991    detached retina     Family History   Problem Relation Age of Onset     Hypertension Mother      Lipids Mother      Depression Mother         bipolar, also hysterectomy for fibroid     Genitourinary Problems Mother         Kidney  - on transplant list     Allergies Mother      Obesity Mother      Cancer Mother         skin cancer     Hyperlipidemia Mother      Mental Illness Mother         Bipolar     Lipids Brother      Allergies Father      Obesity Father      Cancer Father         skin cancer     Cancer Maternal Grandmother         ovarian     Cancer Maternal Grandfather         lung     Social History     Socioeconomic History     Marital status:      Spouse name: Not on file     Number of children: Not on file     Years of education: Not on file     Highest education level: Not on file   Occupational History     Not on file   Tobacco Use     Smoking status: Never     Smokeless tobacco: Never   Vaping Use      Vaping status: Never Used   Substance and Sexual Activity     Alcohol use: Yes     Alcohol/week: 0.0 standard drinks of alcohol     Comment: -monthly     Drug use: No     Sexual activity: Not Currently     Partners: Male     Birth control/protection: Post-menopausal   Other Topics Concern     Parent/sibling w/ CABG, MI or angioplasty before 65F 55M? No   Social History Narrative     Not on file     Social Drivers of Health     Financial Resource Strain: Not on file   Food Insecurity: Not on file   Transportation Needs: Not on file   Physical Activity: Not on file   Stress: Not on file   Social Connections: Not on file   Interpersonal Safety: Low Risk  (10/15/2024)    Interpersonal Safety      Do you feel physically and emotionally safe where you currently live?: Yes      Within the past 12 months, have you been hit, slapped, kicked or otherwise physically hurt by someone?: No      Within the past 12 months, have you been humiliated or emotionally abused in other ways by your partner or ex-partner?: No   Housing Stability: Not on file        Allergies   Allergen Reactions     Yellow Dye Anaphylaxis     Yellow dye #5/#6      Current Outpatient Medications   Medication Sig Dispense Refill     acetaminophen (TYLENOL) 325 MG tablet Take 2 tablets (650 mg) by mouth every 4 hours as needed for mild pain. 50 tablet 0     EPINEPHrine (ADRENACLICK) 0.3 MG/0.3ML injection 2-pack Inject 0.3 mLs (0.3 mg) into the muscle as needed for anaphylaxis 0.6 mL 2     lisinopril (ZESTRIL) 20 MG tablet Take 1 tablet (20 mg) by mouth daily. 90 tablet 3     montelukast (SINGULAIR) 10 MG tablet Take 1 tablet (10 mg) by mouth at bedtime. 90 tablet 3     omeprazole (PRILOSEC) 40 MG DR capsule TAKE 1 CAPSULE DAILY 90 capsule 3     simvastatin (ZOCOR) 40 MG tablet TAKE 1 TABLET AT BEDTIME 90 tablet 3     timolol (TIMOPTIC) 0.5 % ophthalmic solution 2 times daily as needed       vitamin D3 (CHOLECALCIFEROL) 50 mcg (2000 units) tablet Take 1 tablet  by mouth daily.       No current facility-administered medications for this visit.     Exam:  Wt Readings from Last 4 Encounters:   04/21/25 122.5 kg (270 lb)   10/15/24 122.5 kg (270 lb)   09/19/24 118.4 kg (261 lb)   10/23/23 113.4 kg (250 lb)     Reported vitals:  There were no vitals taken for this visit.   healthy, alert and no distress  PSYCH: Alert and oriented times 3; coherent speech, normal   rate and volume, able to articulate logical thoughts, able   to abstract reason, no tangential thoughts, no hallucinations   or delusions  Her affect is normal and pleasant  RESP: No cough, no audible wheezing, able to talk in full sentences  Remainder of exam unable to be completed due to telephone visits     Labs:    Surgical Pathology:  Specimens  A Other, portion of right thyroid nodule  B Parathyroid, right superior parathyroid -evaluate for parathyroid tissue  C Thyroid, Right, right lobe of thyroid and isthmus  .  .  Final Diagnosis  A. PORTION OF RIGHT THYROID NODULE:  - Enlarged and hypercellular parathyroid gland tissue, weight: 450 milligrams  A. PARATHYROID, RIGHT SUPERIOR:  - Parathyroid gland tissue present  C. RIGHT LOBE OF THYROID AND ISTHMUS:  - Multinodular follicular hyperplasia with dominant nodule, 1.2 cm  This is an appended report. These results have been appended to a previously preliminary verified report.  Electronically signed by Henrietta Chang MD on 10/21/2024 at 12:45 PM  .  Clinical Information  Procedure: parathyroidectomy  Pre-op Diagnosis: Hyperparathyroidism (H24) [E21.3]  This is an appended report. These results have been appended to a previously preliminary verified report.  Intraoperative Consultation  A(1). Other, portion of right thyroid nodule:  AFS1:  Parathyroid tissue, 0.45 g  Rita Watson MD on 10/15/2024 at 12:03 PM  Intra op performed at:[facility], [facility], [address] [place] [address], [facility], Room 3-  [address], [facility]  [id number]  Intra-op Dx verbally delivered to Shonna Gustafson MD B(2). Parathyroid, right superior parathyroid -evaluate for parathyroid tissue:  BFS1:  [id number] Printed: 10/21/2024 12:45 PM  Page: 1 of 3    Lab Results   Component Value Date    ANGELLA 9.6 04/07/2025    ANGELLA 9.3 12/02/2024    ANGELLA 9.5 11/04/2024    ANGELLA 10.4 09/19/2024    ALBUMIN 4.2 04/07/2025    ICAW 5.4 (H) 09/19/2024    ALT 37 09/22/2023    PHOS 3.9 11/04/2024    PTHI 51 12/02/2024    PTHI 71 (H) 11/04/2024    PTHI 9 (L) 10/15/2024    PTHI 11 (L) 10/15/2024    PTHI 177 (H) 10/15/2024    PTHI 83 (H) 10/23/2023    PTHI 86 (H) 09/29/2023    AST 28 09/22/2023    BILITOTAL 0.4 09/22/2023    CR 0.87 04/07/2025    CR 0.76 09/19/2024    CR 0.72 09/22/2023     09/19/2024    TSH 5.49 (H) 04/07/2025    ALKPHOS 121 (H) 09/22/2023    VITDT 35 11/04/2024    HGB 12.2 02/13/2015       Recent Labs   Lab Test 04/07/25  0851 12/26/24  1126 12/02/24  0807   T4 0.84* 0.97 0.84*   TSH 5.49* 4.65* 6.30*                     Results for orders placed during the hospital encounter of 11/09/23    US Head Neck Soft Tissue    Narrative  US HEAD AND NECK SOFT TISSUE 11/9/2023 2:29 PM    CLINICAL HISTORY: Patient with primary hyperparathyroidism, negative  parathyroid CT. Evaluate for enlarged parathyroid. Hyperparathyroidism  (H24).    TECHNIQUE: Thyroid ultrasound.    COMPARISON: 2/13/2015.    FINDINGS:  RIGHT lobe: 5.9 x 2 x 1.9 cm. Homogeneous echotexture.  Isthmus: 2 mm.  LEFT lobe: 4.8 x 1.6 x 1.5 cm. Homogeneous echotexture.    NECK: No cervical lymphadenopathy. No parathyroid mass or nodule is  seen.    NODULES:    Nodule 1: Right mid pole thyroid nodule measuring 31 x 19 x 18 mm  Composition: Solid or almost completely solid, 2 points  Echogenicity: Hyperechoic or isoechoic, 1 point  Shape: Not taller than wide, 0 points  Margin: Smooth, 0 points  Echogenic Foci: Punctate echoic foci, 3 points  Point Total: 4-6 points. TI-RADS 4. If 1.5 cm or larger,  recommend  FNA; if 1 cm or larger, follow up US (annually for 5 years).    Nodule 2: Right lower pole thyroid nodule measuring 8 x 7 x 6 mm  Composition: Solid or almost completely solid, 2 points  Echogenicity: Hypoechoic, 2 points  Shape: Not taller than wide, 0 points  Margin: Smooth, 0 points  Echogenic Foci: None, or large comet-tail artifacts, 0 points  Point Total: 4-6 points. TI-RADS 4. If 1.5 cm or larger, recommend  FNA; if 1 cm or larger, follow up US (annually for 5 years).    Nodule 3: Left mid pole thyroid nodule measuring 9 x 7 x 7 mm  Composition: Unable to determine, 2 points  Echogenicity: Hypoechoic, 2 points  Shape: Not taller than wide, 0 points  Margin: Smooth, 0 points  Echogenic Foci: None, or large comet-tail artifacts, 0 points  Point Total: 4-6 points. TI-RADS 4. If 1.5 cm or larger, recommend  FNA; if 1 cm or larger, follow up US (annually for 5 years).    Impression  IMPRESSION:  1.  Bilateral thyroid nodules. Dominant right thyroid nodule measuring  up to 3.1 cm meets criteria for fine needle aspiration.  2.  No sonographic evidence of a parathyroid lesion.    Nodules are characterized per  ACR Thyroid Imaging, Reporting and Data System (TI-RADS): White Paper  of the ACR TI-RADS Committee  Cheikh Diego., et al. Journal of the American College of  Radiology 2017. Volume 14 (2017), Issue 5, 091-029.    ROXANA ARBOLEDA MD      SYSTEM ID:  [id number]        Results for orders placed during the hospital encounter of 12/08/23    NM Parathyroid Planar Imaging Single    Narrative  EXAM: NM PARATHYROID PLANAR IMAGING SINGLE ISOTOPE  LOCATION: [facility]  DATE: 12/8/2023    INDICATION: Primary hyperparathyroidism  COMPARISON: Parathyroid CT dated 10/29/2023.  TECHNIQUE: 26.1 mCi technetium-99m sestamibi, IV. Anterior planar images of the neck and chest at 15 minutes and 2 hours post injection.    FINDINGS: Subtle radiotracer uptake along the posteroinferior  aspect of the right inferior thyroid lobe suspicious for a parathyroid adenoma.    Impression  IMPRESSION:    Findings suspicious for a parathyroid adenoma along the posteroinferior aspect of the right inferior thyroid lobe      Results for orders placed during the hospital encounter of 11/09/23    US Head Neck Soft Tissue    Narrative  US HEAD AND NECK SOFT TISSUE 11/9/2023 2:29 PM    CLINICAL HISTORY: Patient with primary hyperparathyroidism, negative  parathyroid CT. Evaluate for enlarged parathyroid. Hyperparathyroidism  (H24).    TECHNIQUE: Thyroid ultrasound.    COMPARISON: 2/13/2015.    FINDINGS:  RIGHT lobe: 5.9 x 2 x 1.9 cm. Homogeneous echotexture.  Isthmus: 2 mm.  LEFT lobe: 4.8 x 1.6 x 1.5 cm. Homogeneous echotexture.    NECK: No cervical lymphadenopathy. No parathyroid mass or nodule is  seen.    NODULES:    Nodule 1: Right mid pole thyroid nodule measuring 31 x 19 x 18 mm  Composition: Solid or almost completely solid, 2 points  Echogenicity: Hyperechoic or isoechoic, 1 point  Shape: Not taller than wide, 0 points  Margin: Smooth, 0 points  Echogenic Foci: Punctate echoic foci, 3 points  Point Total: 4-6 points. TI-RADS 4. If 1.5 cm or larger, recommend  FNA; if 1 cm or larger, follow up US (annually for 5 years).    Nodule 2: Right lower pole thyroid nodule measuring 8 x 7 x 6 mm  Composition: Solid or almost completely solid, 2 points  Echogenicity: Hypoechoic, 2 points  Shape: Not taller than wide, 0 points  Margin: Smooth, 0 points  Echogenic Foci: None, or large comet-tail artifacts, 0 points  Point Total: 4-6 points. TI-RADS 4. If 1.5 cm or larger, recommend  FNA; if 1 cm or larger, follow up US (annually for 5 years).    Nodule 3: Left mid pole thyroid nodule measuring 9 x 7 x 7 mm  Composition: Unable to determine, 2 points  Echogenicity: Hypoechoic, 2 points  Shape: Not taller than wide, 0 points  Margin: Smooth, 0 points  Echogenic Foci: None, or large comet-tail artifacts, 0 points  Point  Total: 4-6 points. TI-RADS 4. If 1.5 cm or larger, recommend  FNA; if 1 cm or larger, follow up US (annually for 5 years).    Impression  IMPRESSION:  1.  Bilateral thyroid nodules. Dominant right thyroid nodule measuring  up to 3.1 cm meets criteria for fine needle aspiration.  2.  No sonographic evidence of a parathyroid lesion.    Nodules are characterized per  ACR Thyroid Imaging, Reporting and Data System (TI-RADS): White Paper  of the ACR TI-RADS Committee  Cheikh Diego et al. Journal of the American College of  Radiology 2017. Volume 14 (2017), Issue 5, 071-542.    ROXANA ARBOLEDA MD      SYSTEM ID:  [id number]        Results for orders placed during the hospital encounter of 10/10/23    DX Hip/Pelvis/Spine    Narrative  EXAM: DX WRIST/HEEL/RADIUS, DX HIP/PELVIS/SPINE  LOCATION: [facility]  DATE: 10/10/2023    INDICATION: Postmenopausal female with a history of breast cancer. History of hyperparathyroidism.  DEMOGRAPHICS: Age- 57 years. Gender- Female. Menopausal status- Postmenopausal.  COMPARISON: 11/21/2017.  TECHNIQUE: Dual-energy x-ray absorptiometry (DXA) performed with routine technique. Forearm DXA performed due to hyperparathyroidism.    FINDINGS:    DXA RESULTS  -Lumbar Spine: L1-L4: BMD: 1.305 g/cm2. T-score: 0.9. Z-score: 0.9. I suspect degenerative changes throughout the lumbar spine.  -RIGHT Hip Total: BMD: 0.975 g/cm2. T-score: -0.3. Z-score: -0.2.  -RIGHT Hip Femoral neck: BMD: 0.935 g/cm2. T-score: -0.7. Z-score: -0.3.  -LEFT Hip Total: BMD: 0.983 g/cm2. T-score: -0.2. Z-score: -0.1.  -LEFT Hip Femoral neck: BMD: 0.845 g/cm2. T-score: -1.4. Z-score: -0.9.  -LEFT Radius 33%: BMD: 0.620 g/cm2. T-score: -1.3. Z-score: -0.4.    WHO T-SCORE CRITERIA  -Normal: T score at or above -1 SD  -Osteopenia: T score between -1 and -2.5 SD  -Osteoporosis: T score at or below -2.5 SD    The World Health Organization (WHO) criteria is applicable to perimenopausal females,  postmenopausal females, and men aged 50 years or older.    INTERVAL CHANGE  -There has been a 1.8% decrease in L3-L4 lumbar spine BMD. Sclerotic changes likely seen throughout the lumbar spine.  -There has been a 6.4% decrease in bilateral hip BMD.    FRACTURE RISK  -FRAX Results: The 10 year probability of major osteoporotic fracture is 7.0%, and of hip fracture is 0.5%, based on left femoral neck BMD.    RECOMMENDATIONS  Consider treatment if major osteoporotic fracture score is greater than or equal to 20%, or if the hip fracture score is greater than or equal to 3%.    Impression  IMPRESSION: Low bone density (OSTEOPENIA). T score meets the WHO criteria for low bone density (osteopenia) at one or more measured sites. The risk of osteoporotic fracture increases approximately two-fold for each standard deviation decrease in T-score.      Again, thank you for allowing me to participate in the care of your patient.        Sincerely,        Farida Sanchez MD    Electronically signed

## 2025-04-21 NOTE — PROGRESS NOTES
Virtual Visit Details    Type of service:  Video Visit     Originating Location (pt. Location): Home    Distant Location (provider location):  Off-site  Platform used for Video Visit: [facility]                Endocrine Clinic Consult        Follow up for primary hyperparathyroidism      Assessment   Ann Vargas is a 59 year old woman St post parathyroidectomy on 10/15/24. Surgical pathology shows intrathyroidal parathyroid adenoma. PTH decreased during surgery to 9. With this she is at risk for post-surgical hypocalcemia and has mild symptoms with mild intermittent carpal spasms.  St post right thyroid lobe and isthmus resection which are benign.  Intolerance to levothyroxine at 50 mcg with significant symptoms.  Thyroid hormone levels in the subclinical range.  Treatment only indicated for clear symptoms of hypothyroidism.  Will refrain from replacement at this point.  Parathyroid hormone levels have improved significantly.  Calcium intake is recommended according to general recommendation for postmenopausal woman for 1200 1500 mg daily.  Would recommend to continue this vitamin D at 2000 units daily     Plan  Goal Calcium intake [id number] mg from food and If needed supplements  Vitamin D 4000 units daily  Will check vitamin B12 in 3 months with next labs  Recheck labs in 3 months for PTH TSH free T4 and calcium and vitamin B12  If unremarkable can follow-up with primary care    The longitudinal plan of care for the primary hyperparathyroidism as documented were addressed during this visit. Due to the added complexity in care, I will continue to support Ann in the subsequent management and with ongoing continuity of care.The longitudinal plan of care for the diagnosis(es)/condition(s) as documented were addressed during this visit. Due to the added complexity in care, I will continue to support Ann in the subsequent management and with ongoing continuity of care.    Farida Sanchez MD  Endocrinology and  Diabetes  Telephone contact:  [facility] [place] [phone]  [facility] [place] [phone]          Interval histroy  6 m follow up  Pt had parathyroid resection 10/15/2024 with right thyroid lobe and isthmus resection showing parathyroid adenoma in the right superior nodule.  PTHi decrease <10 intra-op  Pt notes carpal spasms upon questioning  Patient had been on Tums daily or every other day  We had started a trial of levothyroxine 50 mcg daily.  For subclinical hypothyroidism with fatigue.  With this patient had heart racing palpitations and felt quite unwell after 2 tablets.  Reluctant to restart even at lower dose considering this experience.      Symptoms of hypo- and hyperthyroidism:  Weight change no; heat or cold intolerance unclear; abnormal bowel movements no; hair loss yes, change in skin pattern no; anxietyno, depression no; fatigue yes; heart racing yes; tremors yes;      Past Medical History:   Diagnosis Date    Benign essential hypertension 2020    Breast cancer (H)     Chronic rhinitis 10/23/2006    10/23/2006 Had been on shots--now on singulair  Working well    Complication of anesthesia     Diagnostic skin and sensitization tests (aka ALLERGENS)     3/11/15 IgE tests NEGATIVE for orange, tomato, strawberry, apple--cherry, g. pepper and mushrrom are pending.    GERD (gastroesophageal reflux disease)     Hyperlipidemia     PONV (postoperative nausea and vomiting)     Unexplained endometrial cells on cervical cytology 12/17/15    2/15/16 emb scheduled.      Past Surgical History:   Procedure Laterality Date    BIOPSY BREAST      C/SECTION, CLASSICAL  Aug 93 &     , Classical x 2    EYE SURGERY  ?    detachted retina surgery    LUMPECTOMY BREAST      LUMPECTOMY BREAST WITH SEED LOCALIZATION Left 2014    Procedure: LUMPECTOMY BREAST WITH SEED LOCALIZATION;  Surgeon: Shonna Leung MD;  Location: [facility]     LUMPECTOMY BREAST WITH SENTINEL NODE, COMBINED Left 11/19/2014    Procedure: COMBINED LUMPECTOMY BREAST WITH SENTINEL NODE;  Surgeon: Shonna Leung MD;  Location: [facility]    PARATHYROIDECTOMY N/A 10/15/2024    Procedure: RIGHT THYROIDECTOMY AND ISTHMUSECTOMY for intrathyroidal parathyroid;  Surgeon: Shonna Gustafson MD;  Location: [facility] OR    PHACOEMULSIFICATION WITH STANDARD INTRAOCULAR LENS IMPLANT Right 8/17/2020    Procedure: Cataract Removal with Implant;  Surgeon: Ricki Luo MD;  Location: [facility]    PHACOEMULSIFICATION WITH STANDARD INTRAOCULAR LENS IMPLANT Left 9/9/2020    Procedure: Cataract Removal with Implant;  Surgeon: Ricki Luo MD;  Location: [facility]    SURGICAL HISTORY OF -   1991    detached retina     Family History   Problem Relation Age of Onset    Hypertension Mother     Lipids Mother     Depression Mother         bipolar, also hysterectomy for fibroid    Genitourinary Problems Mother         Kidney  - on transplant list    Allergies Mother     Obesity Mother     Cancer Mother         skin cancer    Hyperlipidemia Mother     Mental Illness Mother         Bipolar    Lipids Brother     Allergies Father     Obesity Father     Cancer Father         skin cancer    Cancer Maternal Grandmother         ovarian    Cancer Maternal Grandfather         lung     Social History     Socioeconomic History    Marital status:      Spouse name: Not on file    Number of children: Not on file    Years of education: Not on file    Highest education level: Not on file   Occupational History    Not on file   Tobacco Use    Smoking status: Never    Smokeless tobacco: Never   Vaping Use    Vaping status: Never Used   Substance and Sexual Activity    Alcohol use: Yes     Alcohol/week: 0.0 standard drinks of alcohol     Comment: -monthly    Drug use: No    Sexual activity: Not Currently     Partners: Male     Birth control/protection: Post-menopausal   Other Topics Concern     Parent/sibling w/ CABG, MI or angioplasty before 65F 55M? No   Social History Narrative    Not on file     Social Drivers of Health     Financial Resource Strain: Not on file   Food Insecurity: Not on file   Transportation Needs: Not on file   Physical Activity: Not on file   Stress: Not on file   Social Connections: Not on file   Interpersonal Safety: Low Risk  (10/15/2024)    Interpersonal Safety     Do you feel physically and emotionally safe where you currently live?: Yes     Within the past 12 months, have you been hit, slapped, kicked or otherwise physically hurt by someone?: No     Within the past 12 months, have you been humiliated or emotionally abused in other ways by your partner or ex-partner?: No   Housing Stability: Not on file        Allergies   Allergen Reactions    Yellow Dye Anaphylaxis     Yellow dye #5/#6      Current Outpatient Medications   Medication Sig Dispense Refill    acetaminophen (TYLENOL) 325 MG tablet Take 2 tablets (650 mg) by mouth every 4 hours as needed for mild pain. 50 tablet 0    EPINEPHrine (ADRENACLICK) 0.3 MG/0.3ML injection 2-pack Inject 0.3 mLs (0.3 mg) into the muscle as needed for anaphylaxis 0.6 mL 2    lisinopril (ZESTRIL) 20 MG tablet Take 1 tablet (20 mg) by mouth daily. 90 tablet 3    montelukast (SINGULAIR) 10 MG tablet Take 1 tablet (10 mg) by mouth at bedtime. 90 tablet 3    omeprazole (PRILOSEC) 40 MG DR capsule TAKE 1 CAPSULE DAILY 90 capsule 3    simvastatin (ZOCOR) 40 MG tablet TAKE 1 TABLET AT BEDTIME 90 tablet 3    timolol (TIMOPTIC) 0.5 % ophthalmic solution 2 times daily as needed      vitamin D3 (CHOLECALCIFEROL) 50 mcg (2000 units) tablet Take 1 tablet by mouth daily.       No current facility-administered medications for this visit.     Exam:  Wt Readings from Last 4 Encounters:   04/21/25 122.5 kg (270 lb)   10/15/24 122.5 kg (270 lb)   09/19/24 118.4 kg (261 lb)   10/23/23 113.4 kg (250 lb)     Reported vitals:  There were no vitals taken for this  visit.   healthy, alert and no distress  PSYCH: Alert and oriented times 3; coherent speech, normal   rate and volume, able to articulate logical thoughts, able   to abstract reason, no tangential thoughts, no hallucinations   or delusions  Her affect is normal and pleasant  RESP: No cough, no audible wheezing, able to talk in full sentences  Remainder of exam unable to be completed due to telephone visits     Labs:    Surgical Pathology:  Specimens  A Other, portion of right thyroid nodule  B Parathyroid, right superior parathyroid -evaluate for parathyroid tissue  C Thyroid, Right, right lobe of thyroid and isthmus  .  .  Final Diagnosis  A. PORTION OF RIGHT THYROID NODULE:  - Enlarged and hypercellular parathyroid gland tissue, weight: 450 milligrams  A. PARATHYROID, RIGHT SUPERIOR:  - Parathyroid gland tissue present  C. RIGHT LOBE OF THYROID AND ISTHMUS:  - Multinodular follicular hyperplasia with dominant nodule, 1.2 cm  This is an appended report. These results have been appended to a previously preliminary verified report.  Electronically signed by Henrietta Chang MD on 10/21/2024 at 12:45 PM  .  Clinical Information  Procedure: parathyroidectomy  Pre-op Diagnosis: Hyperparathyroidism (H24) [E21.3]  This is an appended report. These results have been appended to a previously preliminary verified report.  Intraoperative Consultation  A(1). Other, portion of right thyroid nodule:  AFS1:  Parathyroid tissue, 0.45 g  Rita Watson MD on 10/15/2024 at 12:03 PM  Intra op performed at:[facility], [facility], [address] [place] [address], Room 3[facility], [facility] [address]  Intra-op Dx verbally delivered to Shonna Gustafson MD  B(2). Parathyroid, right superior parathyroid -evaluate for parathyroid tissue:  BFS1:  [id number] Printed: 10/21/2024 12:45 PM  Page: 1 of 3    Lab Results   Component Value Date    ANGELLA 9.6 04/07/2025    ANGELLA 9.3 12/02/2024    ANGELLA 9.5 11/04/2024     ANGELLA 10.4 09/19/2024    ALBUMIN 4.2 04/07/2025    ICAW 5.4 (H) 09/19/2024    ALT 37 09/22/2023    PHOS 3.9 11/04/2024    PTHI 51 12/02/2024    PTHI 71 (H) 11/04/2024    PTHI 9 (L) 10/15/2024    PTHI 11 (L) 10/15/2024    PTHI 177 (H) 10/15/2024    PTHI 83 (H) 10/23/2023    PTHI 86 (H) 09/29/2023    AST 28 09/22/2023    BILITOTAL 0.4 09/22/2023    CR 0.87 04/07/2025    CR 0.76 09/19/2024    CR 0.72 09/22/2023     09/19/2024    TSH 5.49 (H) 04/07/2025    ALKPHOS 121 (H) 09/22/2023    VITDT 35 11/04/2024    HGB 12.2 02/13/2015       Recent Labs   Lab Test 04/07/25  0851 12/26/24  1126 12/02/24  0807   T4 0.84* 0.97 0.84*   TSH 5.49* 4.65* 6.30*                     Results for orders placed during the hospital encounter of 11/09/23    US Head Neck Soft Tissue    Narrative  US HEAD AND NECK SOFT TISSUE 11/9/2023 2:29 PM    CLINICAL HISTORY: Patient with primary hyperparathyroidism, negative  parathyroid CT. Evaluate for enlarged parathyroid. Hyperparathyroidism  (H24).    TECHNIQUE: Thyroid ultrasound.    COMPARISON: 2/13/2015.    FINDINGS:  RIGHT lobe: 5.9 x 2 x 1.9 cm. Homogeneous echotexture.  Isthmus: 2 mm.  LEFT lobe: 4.8 x 1.6 x 1.5 cm. Homogeneous echotexture.    NECK: No cervical lymphadenopathy. No parathyroid mass or nodule is  seen.    NODULES:    Nodule 1: Right mid pole thyroid nodule measuring 31 x 19 x 18 mm  Composition: Solid or almost completely solid, 2 points  Echogenicity: Hyperechoic or isoechoic, 1 point  Shape: Not taller than wide, 0 points  Margin: Smooth, 0 points  Echogenic Foci: Punctate echoic foci, 3 points  Point Total: 4-6 points. TI-RADS 4. If 1.5 cm or larger, recommend  FNA; if 1 cm or larger, follow up US (annually for 5 years).    Nodule 2: Right lower pole thyroid nodule measuring 8 x 7 x 6 mm  Composition: Solid or almost completely solid, 2 points  Echogenicity: Hypoechoic, 2 points  Shape: Not taller than wide, 0 points  Margin: Smooth, 0 points  Echogenic Foci: None, or  large comet-tail artifacts, 0 points  Point Total: 4-6 points. TI-RADS 4. If 1.5 cm or larger, recommend  FNA; if 1 cm or larger, follow up US (annually for 5 years).    Nodule 3: Left mid pole thyroid nodule measuring 9 x 7 x 7 mm  Composition: Unable to determine, 2 points  Echogenicity: Hypoechoic, 2 points  Shape: Not taller than wide, 0 points  Margin: Smooth, 0 points  Echogenic Foci: None, or large comet-tail artifacts, 0 points  Point Total: 4-6 points. TI-RADS 4. If 1.5 cm or larger, recommend  FNA; if 1 cm or larger, follow up US (annually for 5 years).    Impression  IMPRESSION:  1.  Bilateral thyroid nodules. Dominant right thyroid nodule measuring  up to 3.1 cm meets criteria for fine needle aspiration.  2.  No sonographic evidence of a parathyroid lesion.    Nodules are characterized per  ACR Thyroid Imaging, Reporting and Data System (TI-RADS): White Paper  of the ACR TI-RADS Committee  Cheikh Diego., et al. Journal of the American College of  Radiology 2017. Volume 14 (2017), Issue 5, 378-746.    ROXANA ARBOLEDA MD      SYSTEM ID:  [id number]        Results for orders placed during the hospital encounter of 12/08/23    NM Parathyroid Planar Imaging Single    Narrative  EXAM: NM PARATHYROID PLANAR IMAGING SINGLE ISOTOPE  LOCATION: [facility]  DATE: 12/8/2023    INDICATION: Primary hyperparathyroidism  COMPARISON: Parathyroid CT dated 10/29/2023.  TECHNIQUE: 26.1 mCi technetium-99m sestamibi, IV. Anterior planar images of the neck and chest at 15 minutes and 2 hours post injection.    FINDINGS: Subtle radiotracer uptake along the posteroinferior aspect of the right inferior thyroid lobe suspicious for a parathyroid adenoma.    Impression  IMPRESSION:    Findings suspicious for a parathyroid adenoma along the posteroinferior aspect of the right inferior thyroid lobe      Results for orders placed during the hospital encounter of 11/09/23    US Head Neck Soft  Tissue    Narrative  US HEAD AND NECK SOFT TISSUE 11/9/2023 2:29 PM    CLINICAL HISTORY: Patient with primary hyperparathyroidism, negative  parathyroid CT. Evaluate for enlarged parathyroid. Hyperparathyroidism  (H24).    TECHNIQUE: Thyroid ultrasound.    COMPARISON: 2/13/2015.    FINDINGS:  RIGHT lobe: 5.9 x 2 x 1.9 cm. Homogeneous echotexture.  Isthmus: 2 mm.  LEFT lobe: 4.8 x 1.6 x 1.5 cm. Homogeneous echotexture.    NECK: No cervical lymphadenopathy. No parathyroid mass or nodule is  seen.    NODULES:    Nodule 1: Right mid pole thyroid nodule measuring 31 x 19 x 18 mm  Composition: Solid or almost completely solid, 2 points  Echogenicity: Hyperechoic or isoechoic, 1 point  Shape: Not taller than wide, 0 points  Margin: Smooth, 0 points  Echogenic Foci: Punctate echoic foci, 3 points  Point Total: 4-6 points. TI-RADS 4. If 1.5 cm or larger, recommend  FNA; if 1 cm or larger, follow up US (annually for 5 years).    Nodule 2: Right lower pole thyroid nodule measuring 8 x 7 x 6 mm  Composition: Solid or almost completely solid, 2 points  Echogenicity: Hypoechoic, 2 points  Shape: Not taller than wide, 0 points  Margin: Smooth, 0 points  Echogenic Foci: None, or large comet-tail artifacts, 0 points  Point Total: 4-6 points. TI-RADS 4. If 1.5 cm or larger, recommend  FNA; if 1 cm or larger, follow up US (annually for 5 years).    Nodule 3: Left mid pole thyroid nodule measuring 9 x 7 x 7 mm  Composition: Unable to determine, 2 points  Echogenicity: Hypoechoic, 2 points  Shape: Not taller than wide, 0 points  Margin: Smooth, 0 points  Echogenic Foci: None, or large comet-tail artifacts, 0 points  Point Total: 4-6 points. TI-RADS 4. If 1.5 cm or larger, recommend  FNA; if 1 cm or larger, follow up US (annually for 5 years).    Impression  IMPRESSION:  1.  Bilateral thyroid nodules. Dominant right thyroid nodule measuring  up to 3.1 cm meets criteria for fine needle aspiration.  2.  No sonographic evidence of a  parathyroid lesion.    Nodules are characterized per  ACR Thyroid Imaging, Reporting and Data System (TI-RADS): White Paper  of the ACR TI-RADS Committee  Cheikh Diego., et al. Journal of the American College of  Radiology 2017. Volume 14 (2017), Issue 5, 184-405.    ROXANA ARBOLEDA MD      SYSTEM ID:  [id number]        Results for orders placed during the hospital encounter of 10/10/23    DX Hip/Pelvis/Spine    Narrative  EXAM: DX WRIST/HEEL/RADIUS, DX HIP/PELVIS/SPINE  LOCATION: [facility]  DATE: 10/10/2023    INDICATION: Postmenopausal female with a history of breast cancer. History of hyperparathyroidism.  DEMOGRAPHICS: Age- 57 years. Gender- Female. Menopausal status- Postmenopausal.  COMPARISON: 11/21/2017.  TECHNIQUE: Dual-energy x-ray absorptiometry (DXA) performed with routine technique. Forearm DXA performed due to hyperparathyroidism.    FINDINGS:    DXA RESULTS  -Lumbar Spine: L1-L4: BMD: 1.305 g/cm2. T-score: 0.9. Z-score: 0.9. I suspect degenerative changes throughout the lumbar spine.  -RIGHT Hip Total: BMD: 0.975 g/cm2. T-score: -0.3. Z-score: -0.2.  -RIGHT Hip Femoral neck: BMD: 0.935 g/cm2. T-score: -0.7. Z-score: -0.3.  -LEFT Hip Total: BMD: 0.983 g/cm2. T-score: -0.2. Z-score: -0.1.  -LEFT Hip Femoral neck: BMD: 0.845 g/cm2. T-score: -1.4. Z-score: -0.9.  -LEFT Radius 33%: BMD: 0.620 g/cm2. T-score: -1.3. Z-score: -0.4.    WHO T-SCORE CRITERIA  -Normal: T score at or above -1 SD  -Osteopenia: T score between -1 and -2.5 SD  -Osteoporosis: T score at or below -2.5 SD    The World Health Organization (WHO) criteria is applicable to perimenopausal females, postmenopausal females, and men aged 50 years or older.    INTERVAL CHANGE  -There has been a 1.8% decrease in L3-L4 lumbar spine BMD. Sclerotic changes likely seen throughout the lumbar spine.  -There has been a 6.4% decrease in bilateral hip BMD.    FRACTURE RISK  -FRAX Results: The 10 year probability of major  osteoporotic fracture is 7.0%, and of hip fracture is 0.5%, based on left femoral neck BMD.    RECOMMENDATIONS  Consider treatment if major osteoporotic fracture score is greater than or equal to 20%, or if the hip fracture score is greater than or equal to 3%.    Impression  IMPRESSION: Low bone density (OSTEOPENIA). T score meets the WHO criteria for low bone density (osteopenia) at one or more measured sites. The risk of osteoporotic fracture increases approximately two-fold for each standard deviation decrease in T-score.

## 2025-07-11 ENCOUNTER — MYC MEDICAL ADVICE (OUTPATIENT)
Dept: FAMILY MEDICINE | Facility: CLINIC | Age: 60
End: 2025-07-11
Payer: COMMERCIAL

## 2025-07-11 DIAGNOSIS — E78.5 HYPERLIPIDEMIA LDL GOAL <130: Primary | ICD-10-CM

## 2025-07-11 DIAGNOSIS — I10 BENIGN ESSENTIAL HYPERTENSION: ICD-10-CM

## 2025-07-21 ENCOUNTER — TELEPHONE (OUTPATIENT)
Dept: ENDOCRINOLOGY | Facility: CLINIC | Age: 60
End: 2025-07-21
Payer: COMMERCIAL

## 2025-07-21 DIAGNOSIS — E21.3 HYPERPARATHYROIDISM: Primary | ICD-10-CM

## (undated) DEVICE — SOL NACL 0.9% IRRIG 1000ML BOTTLE 07138-09

## (undated) DEVICE — SUCTION MANIFOLD NEPTUNE 2 SYS 1 PORT 702-025-000

## (undated) DEVICE — STRAP KNEE/BODY 31143004

## (undated) DEVICE — SURGICEL FIBRILLAR HEMOSTAT 2"X4" JJ1962

## (undated) DEVICE — TUBE ENDOTRACHEAL NIM TRIVANTAGE 6.0MM 8229706

## (undated) DEVICE — ESU ELEC BLADE 2.75" COATED/INSULATED E1455

## (undated) DEVICE — SOL WATER IRRIG 1000ML BOTTLE 07139-09

## (undated) DEVICE — CLIP HORIZON SM RED WIDE SLOT 001201

## (undated) DEVICE — NDL BUTTERFLY 21G .75" 367281

## (undated) DEVICE — SU CHROMIC 3-0 SH 27" G122H

## (undated) DEVICE — EYE PREP BETADINE 5% SOLUTION 30ML 0065-0411-30

## (undated) DEVICE — ESU LIGASURE DISSECTOR EXACT LF2019

## (undated) DEVICE — ESU GROUND PAD ADULT W/CORD E7507

## (undated) DEVICE — SPECIMEN CONTAINER URINE 90ML STERILE 75.1435.002

## (undated) DEVICE — GLOVE BIOGEL PI MICRO SZ 6.5 48565

## (undated) DEVICE — EYE SOL BSS 500ML

## (undated) DEVICE — PACK ENT MINOR CUSTOM ASC

## (undated) DEVICE — SU DERMABOND ADVANCED .7ML DNX12

## (undated) DEVICE — NIM PROBE NS STD INCR PRASS TIP STRL LF DISP 8225825X

## (undated) DEVICE — SOL NACL 0.9% IRRIG 500ML BOTTLE 2F7123

## (undated) DEVICE — SU MONOCRYL 5-0 P-3 18" UND Y493G

## (undated) DEVICE — CLIP HORIZON MED BLUE 002200

## (undated) DEVICE — PREP CHLORAPREP W/ORANGE TINT 10.5ML 260715

## (undated) DEVICE — SOL WATER IRRIG 500ML BOTTLE 2F7113

## (undated) DEVICE — LINEN TOWEL PACK X5 5464

## (undated) DEVICE — MALYUGIN RING

## (undated) RX ORDER — TROPICAMIDE 10 MG/ML
SOLUTION/ DROPS OPHTHALMIC
Status: DISPENSED
Start: 2020-08-17

## (undated) RX ORDER — EPHEDRINE SULFATE 50 MG/ML
INJECTION, SOLUTION INTRAMUSCULAR; INTRAVENOUS; SUBCUTANEOUS
Status: DISPENSED
Start: 2024-10-15

## (undated) RX ORDER — FENTANYL CITRATE 50 UG/ML
INJECTION, SOLUTION INTRAMUSCULAR; INTRAVENOUS
Status: DISPENSED
Start: 2024-10-15

## (undated) RX ORDER — REMIFENTANIL HYDROCHLORIDE 1 MG/ML
INJECTION, POWDER, LYOPHILIZED, FOR SOLUTION INTRAVENOUS
Status: DISPENSED
Start: 2024-10-15

## (undated) RX ORDER — TROPICAMIDE 10 MG/ML
SOLUTION/ DROPS OPHTHALMIC
Status: DISPENSED
Start: 2020-09-09

## (undated) RX ORDER — FENTANYL CITRATE-0.9 % NACL/PF 10 MCG/ML
PLASTIC BAG, INJECTION (ML) INTRAVENOUS
Status: DISPENSED
Start: 2024-10-15

## (undated) RX ORDER — PROPARACAINE HYDROCHLORIDE 5 MG/ML
SOLUTION/ DROPS OPHTHALMIC
Status: DISPENSED
Start: 2020-09-09

## (undated) RX ORDER — SCOLOPAMINE TRANSDERMAL SYSTEM 1 MG/1
PATCH, EXTENDED RELEASE TRANSDERMAL
Status: DISPENSED
Start: 2024-10-15

## (undated) RX ORDER — PHENYLEPHRINE HYDROCHLORIDE 25 MG/ML
SOLUTION/ DROPS OPHTHALMIC
Status: DISPENSED
Start: 2020-09-09

## (undated) RX ORDER — PHENYLEPHRINE HYDROCHLORIDE 25 MG/ML
SOLUTION/ DROPS OPHTHALMIC
Status: DISPENSED
Start: 2020-08-17

## (undated) RX ORDER — PROPOFOL 10 MG/ML
INJECTION, EMULSION INTRAVENOUS
Status: DISPENSED
Start: 2024-10-15

## (undated) RX ORDER — HYDROMORPHONE HYDROCHLORIDE 1 MG/ML
INJECTION, SOLUTION INTRAMUSCULAR; INTRAVENOUS; SUBCUTANEOUS
Status: DISPENSED
Start: 2024-10-15

## (undated) RX ORDER — CYCLOPENTOLATE HYDROCHLORIDE 10 MG/ML
SOLUTION/ DROPS OPHTHALMIC
Status: DISPENSED
Start: 2020-08-17

## (undated) RX ORDER — CYCLOPENTOLATE HYDROCHLORIDE 10 MG/ML
SOLUTION/ DROPS OPHTHALMIC
Status: DISPENSED
Start: 2020-09-09

## (undated) RX ORDER — ACETAMINOPHEN 325 MG/1
TABLET ORAL
Status: DISPENSED
Start: 2024-10-15